# Patient Record
Sex: MALE | Race: WHITE | URBAN - METROPOLITAN AREA
[De-identification: names, ages, dates, MRNs, and addresses within clinical notes are randomized per-mention and may not be internally consistent; named-entity substitution may affect disease eponyms.]

---

## 2018-01-05 ENCOUNTER — DOCTOR'S OFFICE (OUTPATIENT)
Dept: URBAN - METROPOLITAN AREA CLINIC 136 | Facility: CLINIC | Age: 73
Setting detail: OPHTHALMOLOGY
End: 2018-01-05
Payer: COMMERCIAL

## 2018-01-05 DIAGNOSIS — H52.4: ICD-10-CM

## 2018-01-05 DIAGNOSIS — H52.03: ICD-10-CM

## 2018-01-05 DIAGNOSIS — H52.223: ICD-10-CM

## 2018-01-05 PROCEDURE — 92004 COMPRE OPH EXAM NEW PT 1/>: CPT | Performed by: OPTOMETRIST

## 2018-01-05 ASSESSMENT — SPHEQUIV_DERIVED
OS_SPHEQUIV: 3.75
OS_SPHEQUIV: 3.5

## 2018-01-05 ASSESSMENT — REFRACTION_MANIFEST
OS_VA1: 20/
OS_VA1: 20/50-2
OU_VA: 20/
OS_VA1: 20/
OS_VA2: 20/40
OU_VA: 20/
OD_VA1: 20/NI
OS_ADD: +3.25
OD_VA3: 20/
OS_VA2: 20/
OS_CYLINDER: -1.00
OD_VA1: 20/
OS_VA3: 20/
OS_SPHERE: +4.25
OD_VA2: 20/
OD_SPHERE: BALANCE
OU_VA: 20/50-2
OS_VA2: 20/
OD_VA2: 20/NI
OD_VA3: 20/
OD_VA2: 20/
OS_VA3: 20/
OD_ADD: +3.25
OD_VA3: 20/
OS_AXIS: 020
OD_VA1: 20/
OS_VA3: 20/

## 2018-01-05 ASSESSMENT — REFRACTION_CURRENTRX
OD_OVR_VA: 20/
OS_OVR_VA: 20/
OD_OVR_VA: 20/
OD_OVR_VA: 20/
OS_OVR_VA: 20/
OS_OVR_VA: 20/

## 2018-01-05 ASSESSMENT — VISUAL ACUITY
OS_BCVA: 20/CF 1'
OD_BCVA: 20/80-2

## 2018-01-05 ASSESSMENT — REFRACTION_AUTOREFRACTION
OS_SPHERE: +4.25
OS_AXIS: 010
OD_SPHERE: ERROR
OS_CYLINDER: -1.50

## 2018-01-05 ASSESSMENT — CONFRONTATIONAL VISUAL FIELD TEST (CVF): OS_FINDINGS: FULL

## 2018-02-02 ENCOUNTER — DOCTOR'S OFFICE (OUTPATIENT)
Dept: URBAN - METROPOLITAN AREA CLINIC 136 | Facility: CLINIC | Age: 73
Setting detail: OPHTHALMOLOGY
End: 2018-02-02
Payer: MEDICARE

## 2018-02-02 DIAGNOSIS — H25.11: ICD-10-CM

## 2018-02-02 DIAGNOSIS — H25.13: ICD-10-CM

## 2018-02-02 DIAGNOSIS — H53.001: ICD-10-CM

## 2018-02-02 DIAGNOSIS — H35.3122: ICD-10-CM

## 2018-02-02 PROCEDURE — 92134 CPTRZ OPH DX IMG PST SGM RTA: CPT | Performed by: OPHTHALMOLOGY

## 2018-02-02 PROCEDURE — 76512 OPH US DX B-SCAN: CPT | Performed by: OPHTHALMOLOGY

## 2018-02-02 PROCEDURE — 92014 COMPRE OPH EXAM EST PT 1/>: CPT | Performed by: OPHTHALMOLOGY

## 2018-02-02 ASSESSMENT — REFRACTION_MANIFEST
OS_VA3: 20/
OS_SPHERE: +4.25
OS_VA1: 20/
OS_VA2: 20/
OD_VA1: 20/NI
OD_ADD: +3.25
OS_VA3: 20/
OD_VA2: 20/NI
OS_VA2: 20/
OD_VA2: 20/
OD_SPHERE: BALANCE
OS_CYLINDER: -1.00
OD_VA2: 20/
OS_VA1: 20/
OD_VA3: 20/
OD_VA1: 20/
OU_VA: 20/50-2
OS_VA3: 20/
OD_VA1: 20/
OU_VA: 20/
OU_VA: 20/
OD_VA3: 20/
OS_AXIS: 020
OS_VA1: 20/50-2
OS_ADD: +3.25
OS_VA2: 20/40
OD_VA3: 20/

## 2018-02-02 ASSESSMENT — VISUAL ACUITY
OD_BCVA: 20/80-1
OS_BCVA: 20/CF 1'

## 2018-02-02 ASSESSMENT — CONFRONTATIONAL VISUAL FIELD TEST (CVF): OS_FINDINGS: FULL

## 2018-02-02 ASSESSMENT — SPHEQUIV_DERIVED
OS_SPHEQUIV: 3.5
OS_SPHEQUIV: 3.75

## 2018-02-02 ASSESSMENT — REFRACTION_AUTOREFRACTION
OS_CYLINDER: -1.50
OS_AXIS: 010
OD_SPHERE: ERROR
OS_SPHERE: +4.25

## 2018-02-02 ASSESSMENT — REFRACTION_CURRENTRX
OD_OVR_VA: 20/
OD_OVR_VA: 20/
OS_OVR_VA: 20/
OD_OVR_VA: 20/
OS_OVR_VA: 20/
OS_OVR_VA: 20/

## 2018-02-16 ENCOUNTER — DOCTOR'S OFFICE (OUTPATIENT)
Dept: URBAN - METROPOLITAN AREA CLINIC 136 | Facility: CLINIC | Age: 73
Setting detail: OPHTHALMOLOGY
End: 2018-02-16
Payer: MEDICARE

## 2018-02-16 DIAGNOSIS — H25.012: ICD-10-CM

## 2018-02-16 PROCEDURE — 76519 ECHO EXAM OF EYE: CPT | Performed by: OPHTHALMOLOGY

## 2018-03-22 ENCOUNTER — AMBUL SURGICAL CARE (OUTPATIENT)
Dept: URBAN - METROPOLITAN AREA SURGERY 32 | Facility: SURGERY | Age: 73
Setting detail: OPHTHALMOLOGY
End: 2018-03-22
Payer: MEDICARE

## 2018-03-22 DIAGNOSIS — H25.12: ICD-10-CM

## 2018-03-22 DIAGNOSIS — H25.012: ICD-10-CM

## 2018-03-22 DIAGNOSIS — H25.042: ICD-10-CM

## 2018-03-22 PROCEDURE — 66984 XCAPSL CTRC RMVL W/O ECP: CPT | Performed by: OPHTHALMOLOGY

## 2018-03-22 PROCEDURE — G8907 PT DOC NO EVENTS ON DISCHARG: HCPCS | Performed by: OPHTHALMOLOGY

## 2018-03-22 PROCEDURE — G8918 PT W/O PREOP ORDER IV AB PRO: HCPCS | Performed by: OPHTHALMOLOGY

## 2018-03-23 ENCOUNTER — RX ONLY (RX ONLY)
Age: 73
End: 2018-03-23

## 2018-03-23 ENCOUNTER — DOCTOR'S OFFICE (OUTPATIENT)
Dept: URBAN - METROPOLITAN AREA CLINIC 136 | Facility: CLINIC | Age: 73
Setting detail: OPHTHALMOLOGY
End: 2018-03-23
Payer: MEDICARE

## 2018-03-23 DIAGNOSIS — Z96.1: ICD-10-CM

## 2018-03-23 PROCEDURE — 99024 POSTOP FOLLOW-UP VISIT: CPT | Performed by: OPTOMETRIST

## 2018-03-23 ASSESSMENT — REFRACTION_AUTOREFRACTION
OS_SPHERE: +2.00
OS_AXIS: 022
OD_SPHERE: ERROR
OS_CYLINDER: -1.50

## 2018-03-23 ASSESSMENT — REFRACTION_MANIFEST
OS_VA2: 20/
OD_ADD: +3.25
OD_VA2: 20/
OS_AXIS: 020
OD_VA1: 20/
OS_VA2: 20/40
OS_VA1: 20/50-2
OS_ADD: +3.25
OD_VA2: 20/
OS_VA3: 20/
OD_VA1: 20/NI
OS_VA1: 20/
OD_VA3: 20/
OD_SPHERE: BALANCE
OS_VA3: 20/
OD_VA2: 20/NI
OS_VA1: 20/
OD_VA3: 20/
OS_CYLINDER: -1.00
OS_VA3: 20/
OU_VA: 20/50-2
OU_VA: 20/
OU_VA: 20/
OD_VA3: 20/
OS_VA2: 20/
OD_VA1: 20/
OS_SPHERE: +4.25

## 2018-03-23 ASSESSMENT — REFRACTION_CURRENTRX
OD_OVR_VA: 20/
OS_OVR_VA: 20/
OS_OVR_VA: 20/
OD_OVR_VA: 20/
OD_OVR_VA: 20/
OS_OVR_VA: 20/

## 2018-03-23 ASSESSMENT — AXIALLENGTH_DERIVED
OS_AL: 22.8533
OS_AL: 21.9745

## 2018-03-23 ASSESSMENT — VISUAL ACUITY
OD_BCVA: 20/80-1
OS_BCVA: 20/70+1

## 2018-03-23 ASSESSMENT — KERATOMETRY
OD_AXISANGLE_DEGREES: 025
OD_K1POWER_DIOPTERS: 41.25
OS_K1POWER_DIOPTERS: 43.00
OD_K2POWER_DIOPTERS: 44.50
OS_K2POWER_DIOPTERS: 45.50
OS_AXISANGLE_DEGREES: 016

## 2018-03-23 ASSESSMENT — SPHEQUIV_DERIVED
OS_SPHEQUIV: 3.75
OS_SPHEQUIV: 1.25

## 2018-03-26 ENCOUNTER — DOCTOR'S OFFICE (OUTPATIENT)
Dept: URBAN - METROPOLITAN AREA CLINIC 136 | Facility: CLINIC | Age: 73
Setting detail: OPHTHALMOLOGY
End: 2018-03-26
Payer: MEDICARE

## 2018-03-26 DIAGNOSIS — H25.011: ICD-10-CM

## 2018-03-26 PROCEDURE — 92136 OPHTHALMIC BIOMETRY: CPT | Performed by: OPHTHALMOLOGY

## 2018-04-02 ENCOUNTER — DOCTOR'S OFFICE (OUTPATIENT)
Dept: URBAN - METROPOLITAN AREA CLINIC 136 | Facility: CLINIC | Age: 73
Setting detail: OPHTHALMOLOGY
End: 2018-04-02
Payer: MEDICARE

## 2018-04-02 DIAGNOSIS — Z96.1: ICD-10-CM

## 2018-04-02 DIAGNOSIS — H26.40: ICD-10-CM

## 2018-04-02 DIAGNOSIS — H25.11: ICD-10-CM

## 2018-04-02 PROCEDURE — 99024 POSTOP FOLLOW-UP VISIT: CPT | Performed by: OPHTHALMOLOGY

## 2018-04-02 ASSESSMENT — AXIALLENGTH_DERIVED
OS_AL: 22.6348
OS_AL: 21.8556

## 2018-04-02 ASSESSMENT — REFRACTION_MANIFEST
OS_VA1: 20/
OD_VA1: 20/
OD_VA3: 20/
OD_SPHERE: BALANCE
OS_CYLINDER: -1.00
OS_VA2: 20/40
OS_VA3: 20/
OD_VA2: 20/NI
OU_VA: 20/
OS_ADD: +3.25
OD_VA3: 20/
OD_VA1: 20/
OD_ADD: +3.25
OS_VA3: 20/
OS_VA2: 20/
OD_VA3: 20/
OU_VA: 20/
OS_AXIS: 020
OU_VA: 20/50-2
OD_VA2: 20/
OS_SPHERE: +4.25
OS_VA1: 20/
OS_VA2: 20/
OD_VA1: 20/NI
OD_VA2: 20/
OS_VA1: 20/50-2
OS_VA3: 20/

## 2018-04-02 ASSESSMENT — KERATOMETRY
OD_AXISANGLE_DEGREES: 025
OS_K1POWER_DIOPTERS: 43.50
OS_K2POWER_DIOPTERS: 45.75
OD_K2POWER_DIOPTERS: 44.50
OD_K1POWER_DIOPTERS: 41.25
OS_AXISANGLE_DEGREES: 08

## 2018-04-02 ASSESSMENT — REFRACTION_CURRENTRX
OD_OVR_VA: 20/
OS_OVR_VA: 20/
OD_OVR_VA: 20/
OD_OVR_VA: 20/

## 2018-04-02 ASSESSMENT — VISUAL ACUITY
OD_BCVA: 20/50-2
OS_BCVA: 20/70+1

## 2018-04-02 ASSESSMENT — REFRACTION_AUTOREFRACTION
OS_SPHERE: +2.75
OD_SPHERE: ERROR
OS_CYLINDER: -2.50
OS_AXIS: 178

## 2018-04-02 ASSESSMENT — SPHEQUIV_DERIVED
OS_SPHEQUIV: 1.5
OS_SPHEQUIV: 3.75

## 2018-04-26 ENCOUNTER — AMBUL SURGICAL CARE (OUTPATIENT)
Dept: URBAN - METROPOLITAN AREA SURGERY 32 | Facility: SURGERY | Age: 73
Setting detail: OPHTHALMOLOGY
End: 2018-04-26
Payer: MEDICARE

## 2018-04-26 DIAGNOSIS — H25.21: ICD-10-CM

## 2018-04-26 DIAGNOSIS — H25.11: ICD-10-CM

## 2018-04-26 DIAGNOSIS — H25.041: ICD-10-CM

## 2018-04-26 PROCEDURE — G8918 PT W/O PREOP ORDER IV AB PRO: HCPCS | Performed by: OPHTHALMOLOGY

## 2018-04-26 PROCEDURE — G8907 PT DOC NO EVENTS ON DISCHARG: HCPCS | Performed by: OPHTHALMOLOGY

## 2018-04-26 PROCEDURE — 66984 XCAPSL CTRC RMVL W/O ECP: CPT | Performed by: OPHTHALMOLOGY

## 2018-04-27 ENCOUNTER — RX ONLY (RX ONLY)
Age: 73
End: 2018-04-27

## 2018-04-27 ENCOUNTER — DOCTOR'S OFFICE (OUTPATIENT)
Dept: URBAN - METROPOLITAN AREA CLINIC 136 | Facility: CLINIC | Age: 73
Setting detail: OPHTHALMOLOGY
End: 2018-04-27
Payer: MEDICARE

## 2018-04-27 DIAGNOSIS — H26.40: ICD-10-CM

## 2018-04-27 DIAGNOSIS — Z96.1: ICD-10-CM

## 2018-04-27 PROCEDURE — 99024 POSTOP FOLLOW-UP VISIT: CPT | Performed by: OPTOMETRIST

## 2018-04-27 ASSESSMENT — REFRACTION_CURRENTRX
OS_OVR_VA: 20/
OD_OVR_VA: 20/
OS_OVR_VA: 20/
OS_OVR_VA: 20/

## 2018-04-27 ASSESSMENT — REFRACTION_MANIFEST
OS_VA3: 20/
OS_AXIS: 020
OD_VA1: 20/NI
OS_VA3: 20/
OU_VA: 20/
OS_VA2: 20/40
OU_VA: 20/50-2
OS_VA3: 20/
OS_VA1: 20/50-2
OD_VA3: 20/
OS_VA1: 20/
OS_VA1: 20/
OS_VA2: 20/
OS_SPHERE: +4.25
OD_VA2: 20/
OD_VA1: 20/
OD_VA3: 20/
OD_ADD: +3.25
OD_VA2: 20/
OD_VA3: 20/
OD_VA2: 20/NI
OS_VA2: 20/
OD_SPHERE: BALANCE
OU_VA: 20/
OS_CYLINDER: -1.00
OS_ADD: +3.25
OD_VA1: 20/

## 2018-04-27 ASSESSMENT — CORNEAL EDEMA - FOLDS/STRIAE: OD_FOLDSSTRIAE: 2+

## 2018-04-27 ASSESSMENT — VISUAL ACUITY
OS_BCVA: 20/400
OD_BCVA: 20/60

## 2018-04-27 ASSESSMENT — KERATOMETRY
OS_K1POWER_DIOPTERS: 43.50
OD_K2POWER_DIOPTERS: 44.50
OD_K1POWER_DIOPTERS: 41.25
OD_AXISANGLE_DEGREES: 025
OS_AXISANGLE_DEGREES: 08
OS_K2POWER_DIOPTERS: 45.75

## 2018-04-27 ASSESSMENT — SPHEQUIV_DERIVED
OS_SPHEQUIV: 3.75
OS_SPHEQUIV: 1
OD_SPHEQUIV: -0.375

## 2018-04-27 ASSESSMENT — AXIALLENGTH_DERIVED
OS_AL: 21.8556
OD_AL: 23.9737
OS_AL: 22.8155

## 2018-04-27 ASSESSMENT — REFRACTION_AUTOREFRACTION
OS_AXIS: 20
OS_CYLINDER: -1.50
OD_SPHERE: +2.75
OD_AXIS: 170
OS_SPHERE: +1.75
OD_CYLINDER: -6.25

## 2018-04-27 ASSESSMENT — CORNEAL EDEMA - MICROCYSTIC EPITHELIAL EDEMA (MCE): OD_MCE: 1+

## 2018-05-07 ENCOUNTER — DOCTOR'S OFFICE (OUTPATIENT)
Dept: URBAN - METROPOLITAN AREA CLINIC 136 | Facility: CLINIC | Age: 73
Setting detail: OPHTHALMOLOGY
End: 2018-05-07
Payer: MEDICARE

## 2018-05-07 DIAGNOSIS — H26.40: ICD-10-CM

## 2018-05-07 DIAGNOSIS — Z96.1: ICD-10-CM

## 2018-05-07 PROCEDURE — 99024 POSTOP FOLLOW-UP VISIT: CPT | Performed by: OPTOMETRIST

## 2018-05-07 ASSESSMENT — VISUAL ACUITY
OD_BCVA: 20/70+1
OS_BCVA: 20/150

## 2018-05-07 ASSESSMENT — REFRACTION_CURRENTRX
OD_OVR_VA: 20/
OS_OVR_VA: 20/
OD_OVR_VA: 20/
OD_OVR_VA: 20/

## 2018-05-07 ASSESSMENT — REFRACTION_AUTOREFRACTION
OS_SPHERE: +1.50
OS_AXIS: 016
OS_CYLINDER: -1.75
OD_SPHERE: +0.75
OD_AXIS: 180
OD_CYLINDER: -2.00

## 2018-05-07 ASSESSMENT — REFRACTION_MANIFEST
OD_VA3: 20/
OD_VA1: 20/
OD_VA2: 20/
OD_VA3: 20/
OU_VA: 20/
OS_VA2: 20/40
OS_VA3: 20/
OS_ADD: +3.25
OD_VA2: 20/NI
OS_VA3: 20/
OU_VA: 20/
OD_VA3: 20/
OU_VA: 20/50-2
OS_VA2: 20/
OS_AXIS: 020
OS_VA1: 20/
OD_VA2: 20/
OD_VA1: 20/
OD_VA1: 20/NI
OD_SPHERE: BALANCE
OS_SPHERE: +4.25
OS_VA1: 20/
OS_VA2: 20/
OD_ADD: +3.25
OS_VA3: 20/
OS_VA1: 20/50-2
OS_CYLINDER: -1.00

## 2018-05-07 ASSESSMENT — AXIALLENGTH_DERIVED
OD_AL: 23.9235
OS_AL: 22.953
OS_AL: 21.8556

## 2018-05-07 ASSESSMENT — KERATOMETRY
OS_K1POWER_DIOPTERS: 43.50
OD_AXISANGLE_DEGREES: 025
OS_K2POWER_DIOPTERS: 45.75
OS_AXISANGLE_DEGREES: 08
OD_K1POWER_DIOPTERS: 41.25
OD_K2POWER_DIOPTERS: 44.50

## 2018-05-07 ASSESSMENT — CORNEAL EDEMA - MICROCYSTIC EPITHELIAL EDEMA (MCE): OD_MCE: T

## 2018-05-07 ASSESSMENT — SPHEQUIV_DERIVED
OS_SPHEQUIV: 0.625
OS_SPHEQUIV: 3.75
OD_SPHEQUIV: -0.25

## 2018-05-07 ASSESSMENT — CORNEAL EDEMA CLINICAL DESCRIPTION: OD_CORNEALEDEMA: AT INCISION SITE, CLEAR CENTRALLY

## 2018-05-07 ASSESSMENT — CONFRONTATIONAL VISUAL FIELD TEST (CVF): OS_FINDINGS: FULL

## 2018-05-07 ASSESSMENT — CORNEAL EDEMA - FOLDS/STRIAE: OD_FOLDSSTRIAE: T

## 2018-06-13 ENCOUNTER — RX ONLY (RX ONLY)
Age: 73
End: 2018-06-13

## 2018-06-13 ENCOUNTER — DOCTOR'S OFFICE (OUTPATIENT)
Dept: URBAN - METROPOLITAN AREA CLINIC 136 | Facility: CLINIC | Age: 73
Setting detail: OPHTHALMOLOGY
End: 2018-06-13
Payer: MEDICARE

## 2018-06-13 DIAGNOSIS — Z96.1: ICD-10-CM

## 2018-06-13 DIAGNOSIS — H26.40: ICD-10-CM

## 2018-06-13 PROCEDURE — 99024 POSTOP FOLLOW-UP VISIT: CPT | Performed by: OPHTHALMOLOGY

## 2018-06-13 ASSESSMENT — AXIALLENGTH_DERIVED
OS_AL: 22.8611
OD_AL: 23.98
OS_AL: 21.8556

## 2018-06-13 ASSESSMENT — REFRACTION_MANIFEST
OS_VA1: 20/
OS_SPHERE: +4.25
OD_VA3: 20/
OS_VA1: 20/50-2
OU_VA: 20/50-2
OS_VA2: 20/
OD_VA2: 20/
OS_VA3: 20/
OD_VA2: 20/NI
OD_VA1: 20/NI
OS_AXIS: 020
OS_ADD: +3.25
OU_VA: 20/
OS_CYLINDER: -1.00
OS_VA2: 20/40
OD_SPHERE: BALANCE
OS_VA3: 20/
OS_VA3: 20/
OD_ADD: +3.25
OS_VA2: 20/
OU_VA: 20/
OD_VA1: 20/
OD_VA1: 20/
OD_VA2: 20/
OS_VA1: 20/

## 2018-06-13 ASSESSMENT — REFRACTION_AUTOREFRACTION
OD_AXIS: 180
OD_SPHERE: +0.50
OD_CYLINDER: -1.78
OS_AXIS: 017
OS_CYLINDER: -1.75
OS_SPHERE: +1.75

## 2018-06-13 ASSESSMENT — KERATOMETRY
OS_K2POWER_DIOPTERS: 45.75
OD_AXISANGLE_DEGREES: 025
OS_K1POWER_DIOPTERS: 43.50
OD_K2POWER_DIOPTERS: 44.50
OD_K1POWER_DIOPTERS: 41.25
OS_AXISANGLE_DEGREES: 08

## 2018-06-13 ASSESSMENT — SPHEQUIV_DERIVED
OS_SPHEQUIV: 0.875
OD_SPHEQUIV: -0.39
OS_SPHEQUIV: 3.75

## 2018-06-13 ASSESSMENT — REFRACTION_CURRENTRX
OS_OVR_VA: 20/
OS_OVR_VA: 20/
OD_OVR_VA: 20/
OD_OVR_VA: 20/
OS_OVR_VA: 20/
OD_OVR_VA: 20/

## 2018-06-13 ASSESSMENT — VISUAL ACUITY
OD_BCVA: 20/60-1
OS_BCVA: 20/60-1

## 2018-06-13 ASSESSMENT — CORNEAL EDEMA - MICROCYSTIC EPITHELIAL EDEMA (MCE): OD_MCE: T

## 2018-06-13 ASSESSMENT — CORNEAL EDEMA CLINICAL DESCRIPTION: OD_CORNEALEDEMA: AT INCISION SITE, CLEAR CENTRALLY

## 2018-06-13 ASSESSMENT — CORNEAL EDEMA - FOLDS/STRIAE: OD_FOLDSSTRIAE: T

## 2018-06-27 ENCOUNTER — OPTICAL OFFICE (OUTPATIENT)
Dept: URBAN - METROPOLITAN AREA CLINIC 143 | Facility: CLINIC | Age: 73
Setting detail: OPHTHALMOLOGY
End: 2018-06-27
Payer: COMMERCIAL

## 2018-06-27 ENCOUNTER — DOCTOR'S OFFICE (OUTPATIENT)
Dept: URBAN - METROPOLITAN AREA CLINIC 136 | Facility: CLINIC | Age: 73
Setting detail: OPHTHALMOLOGY
End: 2018-06-27
Payer: MEDICARE

## 2018-06-27 DIAGNOSIS — H52.223: ICD-10-CM

## 2018-06-27 DIAGNOSIS — H52.03: ICD-10-CM

## 2018-06-27 DIAGNOSIS — H52.4: ICD-10-CM

## 2018-06-27 PROCEDURE — V2203 LENS SPHCYL BIFOCAL 4.00D/.1: HCPCS | Performed by: OPTOMETRIST

## 2018-06-27 PROCEDURE — V2020 VISION SVCS FRAMES PURCHASES: HCPCS | Performed by: OPTOMETRIST

## 2018-06-27 PROCEDURE — 92015 DETERMINE REFRACTIVE STATE: CPT | Performed by: OPTOMETRIST

## 2018-06-27 ASSESSMENT — REFRACTION_MANIFEST
OS_VA2: 20/
OS_VA1: 20/
OS_VA3: 20/
OD_VA3: 20/
OD_VA1: 20/
OD_VA1: 20/
OD_VA3: 20/
OD_VA2: 20/
OU_VA: 20/
OU_VA: 20/
OD_VA2: 20/
OS_VA3: 20/
OS_VA2: 20/
OS_VA1: 20/

## 2018-06-27 ASSESSMENT — REFRACTION_AUTOREFRACTION
OS_CYLINDER: -2.00
OS_SPHERE: +2.00
OD_SPHERE: +0.75
OD_AXIS: 001
OD_CYLINDER: -2.00
OS_AXIS: 011

## 2018-06-27 ASSESSMENT — REFRACTION_OUTSIDERX
OS_CYLINDER: -1.75
OD_VA2: 20/25
OD_ADD: +3.00
OS_VA1: 20/50
OS_ADD: +3.00
OD_AXIS: 180
OS_AXIS: 010
OU_VA: 20/40
OS_VA3: 20/
OD_VA3: 20/
OS_SPHERE: +2.00
OD_CYLINDER: -2.00
OD_SPHERE: +0.75
OS_VA2: 20/25
OD_VA1: 20/40

## 2018-06-27 ASSESSMENT — CONFRONTATIONAL VISUAL FIELD TEST (CVF)
OD_FINDINGS: FULL
OS_FINDINGS: FULL

## 2018-06-27 ASSESSMENT — KERATOMETRY
OS_K1POWER_DIOPTERS: 43.50
OS_K2POWER_DIOPTERS: 45.75
OS_AXISANGLE_DEGREES: 08
OD_K2POWER_DIOPTERS: 44.50
OD_AXISANGLE_DEGREES: 025
OD_K1POWER_DIOPTERS: 41.25

## 2018-06-27 ASSESSMENT — SPHEQUIV_DERIVED
OD_SPHEQUIV: -0.25
OS_SPHEQUIV: 1

## 2018-06-27 ASSESSMENT — REFRACTION_CURRENTRX
OD_OVR_VA: 20/
OD_OVR_VA: 20/
OS_OVR_VA: 20/
OS_OVR_VA: 20/
OD_OVR_VA: 20/
OS_OVR_VA: 20/

## 2018-06-27 ASSESSMENT — VISUAL ACUITY
OD_BCVA: 20/60
OS_BCVA: 20/50-2

## 2018-06-27 ASSESSMENT — AXIALLENGTH_DERIVED
OD_AL: 23.9235
OS_AL: 22.8155

## 2018-07-31 ENCOUNTER — TELEPHONE (OUTPATIENT)
Dept: FAMILY MEDICINE CLINIC | Facility: CLINIC | Age: 73
End: 2018-07-31

## 2018-07-31 DIAGNOSIS — I10 ESSENTIAL HYPERTENSION: Primary | ICD-10-CM

## 2018-08-01 PROBLEM — M85.80 OSTEOPENIA: Status: ACTIVE | Noted: 2018-03-01

## 2018-08-01 PROBLEM — H25.10 NUCLEAR SENILE CATARACT: Status: ACTIVE | Noted: 2018-03-01

## 2018-08-01 PROBLEM — H91.10 PRESBYCUSIS: Status: ACTIVE | Noted: 2018-02-15

## 2018-08-01 PROBLEM — H26.9 CATARACT OF RIGHT EYE: Status: ACTIVE | Noted: 2018-02-15

## 2018-08-01 PROBLEM — F17.200 NICOTINE DEPENDENCE: Status: ACTIVE | Noted: 2018-02-15

## 2018-08-01 RX ORDER — AMLODIPINE BESYLATE 5 MG/1
5 TABLET ORAL EVERY 24 HOURS
Qty: 30 TABLET | Refills: 3 | Status: SHIPPED | OUTPATIENT
Start: 2018-08-01 | End: 2018-09-11 | Stop reason: SDUPTHER

## 2018-08-01 RX ORDER — ALBUTEROL SULFATE 90 UG/1
2 AEROSOL, METERED RESPIRATORY (INHALATION) EVERY 6 HOURS PRN
COMMUNITY
Start: 2018-02-15 | End: 2018-09-11

## 2018-08-01 RX ORDER — PREDNISOLONE ACETATE 10 MG/ML
SUSPENSION/ DROPS OPHTHALMIC
Refills: 0 | COMMUNITY
Start: 2018-05-09 | End: 2018-09-11

## 2018-08-01 RX ORDER — LISINOPRIL 20 MG/1
TABLET ORAL EVERY 24 HOURS
COMMUNITY
Start: 2018-06-04 | End: 2018-08-01 | Stop reason: SDUPTHER

## 2018-08-01 RX ORDER — OFLOXACIN 3 MG/ML
SOLUTION/ DROPS OPHTHALMIC
Refills: 0 | COMMUNITY
Start: 2018-05-09 | End: 2018-09-11

## 2018-08-01 RX ORDER — LISINOPRIL 20 MG/1
20 TABLET ORAL EVERY 24 HOURS
Qty: 30 TABLET | Refills: 3 | Status: SHIPPED | OUTPATIENT
Start: 2018-08-01 | End: 2018-09-11 | Stop reason: SDUPTHER

## 2018-08-01 RX ORDER — AMLODIPINE BESYLATE 5 MG/1
TABLET ORAL EVERY 24 HOURS
COMMUNITY
Start: 2018-06-04 | End: 2018-08-01 | Stop reason: SDUPTHER

## 2018-09-11 ENCOUNTER — OFFICE VISIT (OUTPATIENT)
Dept: FAMILY MEDICINE CLINIC | Facility: CLINIC | Age: 73
End: 2018-09-11
Payer: COMMERCIAL

## 2018-09-11 VITALS
BODY MASS INDEX: 25.18 KG/M2 | SYSTOLIC BLOOD PRESSURE: 130 MMHG | DIASTOLIC BLOOD PRESSURE: 50 MMHG | WEIGHT: 170 LBS | HEIGHT: 69 IN | HEART RATE: 96 BPM | TEMPERATURE: 98 F | RESPIRATION RATE: 20 BRPM | OXYGEN SATURATION: 94 %

## 2018-09-11 DIAGNOSIS — F17.219 CIGARETTE NICOTINE DEPENDENCE WITH NICOTINE-INDUCED DISORDER: ICD-10-CM

## 2018-09-11 DIAGNOSIS — Z00.00 HEALTHCARE MAINTENANCE: ICD-10-CM

## 2018-09-11 DIAGNOSIS — I10 ESSENTIAL HYPERTENSION: ICD-10-CM

## 2018-09-11 DIAGNOSIS — Z23 NEED FOR PNEUMOCOCCAL VACCINATION: ICD-10-CM

## 2018-09-11 DIAGNOSIS — Z09 FOLLOW UP: Primary | ICD-10-CM

## 2018-09-11 DIAGNOSIS — J45.990 EXERCISE-INDUCED ASTHMA: ICD-10-CM

## 2018-09-11 DIAGNOSIS — Z13.6 ENCOUNTER FOR ABDOMINAL AORTIC ANEURYSM (AAA) SCREENING: ICD-10-CM

## 2018-09-11 DIAGNOSIS — Z12.11 ENCOUNTER FOR SCREENING COLONOSCOPY: ICD-10-CM

## 2018-09-11 PROBLEM — H26.9 CATARACT OF RIGHT EYE: Status: RESOLVED | Noted: 2018-02-15 | Resolved: 2018-09-11

## 2018-09-11 PROBLEM — H25.10 NUCLEAR SENILE CATARACT: Status: RESOLVED | Noted: 2018-03-01 | Resolved: 2018-09-11

## 2018-09-11 PROCEDURE — 3078F DIAST BP <80 MM HG: CPT | Performed by: FAMILY MEDICINE

## 2018-09-11 PROCEDURE — 90670 PCV13 VACCINE IM: CPT | Performed by: FAMILY MEDICINE

## 2018-09-11 PROCEDURE — 4040F PNEUMOC VAC/ADMIN/RCVD: CPT | Performed by: FAMILY MEDICINE

## 2018-09-11 PROCEDURE — 3008F BODY MASS INDEX DOCD: CPT | Performed by: FAMILY MEDICINE

## 2018-09-11 PROCEDURE — 90662 IIV NO PRSV INCREASED AG IM: CPT | Performed by: FAMILY MEDICINE

## 2018-09-11 PROCEDURE — 1160F RVW MEDS BY RX/DR IN RCRD: CPT | Performed by: FAMILY MEDICINE

## 2018-09-11 PROCEDURE — 3075F SYST BP GE 130 - 139MM HG: CPT | Performed by: FAMILY MEDICINE

## 2018-09-11 PROCEDURE — G0008 ADMIN INFLUENZA VIRUS VAC: HCPCS | Performed by: FAMILY MEDICINE

## 2018-09-11 PROCEDURE — G0009 ADMIN PNEUMOCOCCAL VACCINE: HCPCS | Performed by: FAMILY MEDICINE

## 2018-09-11 PROCEDURE — 3725F SCREEN DEPRESSION PERFORMED: CPT | Performed by: FAMILY MEDICINE

## 2018-09-11 PROCEDURE — 1101F PT FALLS ASSESS-DOCD LE1/YR: CPT | Performed by: FAMILY MEDICINE

## 2018-09-11 PROCEDURE — 99214 OFFICE O/P EST MOD 30 MIN: CPT | Performed by: FAMILY MEDICINE

## 2018-09-11 RX ORDER — LISINOPRIL 20 MG/1
20 TABLET ORAL EVERY 24 HOURS
Qty: 90 TABLET | Refills: 0 | Status: SHIPPED | OUTPATIENT
Start: 2018-09-11 | End: 2018-11-16 | Stop reason: SDUPTHER

## 2018-09-11 RX ORDER — ALBUTEROL SULFATE 90 UG/1
2 AEROSOL, METERED RESPIRATORY (INHALATION) EVERY 6 HOURS PRN
Qty: 1 INHALER | Refills: 0 | Status: SHIPPED | OUTPATIENT
Start: 2018-09-11 | End: 2019-04-29 | Stop reason: SDUPTHER

## 2018-09-11 RX ORDER — AMLODIPINE BESYLATE 5 MG/1
5 TABLET ORAL EVERY 24 HOURS
Qty: 90 TABLET | Refills: 0 | Status: SHIPPED | OUTPATIENT
Start: 2018-09-11 | End: 2019-01-02 | Stop reason: SDUPTHER

## 2018-09-11 NOTE — PROGRESS NOTES
Patient ID: Vinay Cat  is a 71 y o  male  Chief Complaint: Follow-up    ASSESSMENT/PLAN    Notes for this visit:    Essential hypertension  Controlled, 130/50  Patient's goal BP is <150/90  Refilled medications for 90 day supply  Labs ordered to R/O comorbid conditions  Exercise-induced asthma  Counseled on proper use  Patient understands and agrees  He has an albuterol inhaler at home that he will resume using  Sent a refill to pharmacy also  Will reevaluate him in 6 weeks to see how his symptoms are doing  Patient aware of reasons to go to ER  Nicotine dependence  Patient agreed to trial of cessation therapy, and says he will try nicotine patch  Reports that it has not worked for his friends but he will try it anyway  Alysia Butler was seen today for follow-up  Diagnoses and all orders for this visit:    Follow up    Essential hypertension  -     amLODIPine (NORVASC) 5 mg tablet; Take 1 tablet (5 mg total) by mouth every 24 hours  -     lisinopril (ZESTRIL) 20 mg tablet; Take 1 tablet (20 mg total) by mouth every 24 hours  -     Hemoglobin A1C; Future  -     Comprehensive metabolic panel; Future  -     Lipid panel; Future  -     TSH, 3rd generation with Free T4 reflex; Future  -     UA w Reflex to Microscopic w Reflex to Culture; Future    Cigarette nicotine dependence with nicotine-induced disorder  -     nicotine (NICODERM CQ) 7 mg/24hr TD 24 hr patch; Place 1 patch on the skin every 24 hours    Encounter for abdominal aortic aneurysm (AAA) screening  -     US abdominal aorta screening aaa; Future    Healthcare maintenance  -     influenza vaccine, [id number], high-dose, PF 0 5 mL, for patients 65 yr+ (FLUZONE HIGH-DOSE)    Encounter for screening colonoscopy  -     Ambulatory referral to Gastroenterology;  Future    Exercise-induced asthma  -     CBC and differential; Future    Need for pneumococcal vaccination  -     PNEUMOCOCCAL CONJUGATE VACCINE 13-VALENT GREATER THAN 6 MONTHS      SUBJECTIVE    Patient presents for follow-up  While reviewing medication he reports not using his asthma medication in 5 to 6 months due to the price  Appears mildly out of breath but says it is because he walked here and the weather may have contributed to it  Has been told in the past to use albuterol inhaler prior to walking  He denies SOB or chest pain presently, and denies any recent events of SOB or chest pain  Says he takes 5 flights of stairs daily to his apartment and never has SOB or chest pain  Reports blood pressure medication compliance, as it costs him a dollar or two for both medications  Asked if he can get 90 day refills as his insurance asked him to do this  Smokes 1 pack per month  Retired  Admits to some difficulty sleeping, but attributes it to working night shift for 31 years  Offered sleep evaluation but says he is managing it okay, and not interested in seeing a sleep specialist at this time  No other new complaints  The following portions of the patient's history were reviewed and updated as appropriate: allergies, current medications, past medical history, past social history and problem list     Review of Systems   Constitutional: Negative for chills and fever  HENT: Positive for hearing loss  Respiratory: Negative for cough and shortness of breath  Cardiovascular: Negative for chest pain and palpitations  OBJECTIVE    Vitals: Blood pressure 130/50, pulse 96, temperature 98 °F (36 7 °C), temperature source Tympanic, resp  rate 20, height 5' 9" (1 753 m), weight 77 1 kg (170 lb), SpO2 94 %  Physical Exam   Constitutional: He is oriented to person, place, and time  He appears well-developed and well-nourished  No distress  HENT:   Head: Normocephalic and atraumatic  Cardiovascular: Normal rate, regular rhythm, normal heart sounds and intact distal pulses  Exam reveals no gallop and no friction rub  No murmur heard    Pulmonary/Chest: Effort normal and breath sounds normal  No respiratory distress  He has no wheezes  He exhibits no tenderness  Neurological: He is alert and oriented to person, place, and time  Nursing note and vitals reviewed

## 2018-09-11 NOTE — ASSESSMENT & PLAN NOTE
Patient agreed to trial of cessation therapy, and says he will try nicotine patch  Reports that it has not worked for his friends but he will try it anyway

## 2018-09-11 NOTE — PATIENT INSTRUCTIONS
Asthma   AMBULATORY CARE:   Asthma  is a lung disease that makes breathing difficult  Chronic inflammation and reactions to triggers narrow the airways in your lungs  Asthma can become life-threatening if it is not managed  Cough-variant asthma  is a type of asthma that causes a dry cough that keeps coming back  A dry cough may be your only symptom, or you may also have chest tightness  These symptoms may be caused by exercise or exposure to odors, allergens, or respiratory tract infections  Cough-variant asthma is treated the same way as typical asthma  Common symptoms include the following:   · Coughing     · Wheezing     · Shortness of breath     · Chest tightness  Seek care immediately if:   · You have severe shortness of breath  · Your lips or nails turn blue or gray  · The skin around your neck and ribs pulls in with each breath  · You have shortness of breath, even after you take your short-term medicine as directed  · Your peak flow numbers are in the red zone of your AAP  Contact your healthcare provider if:   · You run out of medicine before your next refill is due  · Your symptoms get worse  · You need to take more medicine than usual to control your symptoms  · You have questions or concerns about your condition or care  Treatment for asthma  will depend on how severe your asthma is  Medicine may decrease inflammation, open airways, and make it easier to breathe  Medicines may be inhaled, taken as a pill, or injected  Short-term medicines relieve your symptoms quickly  Long-term medicines are used to prevent future attacks  You may also need medicine to help control your allergies  Manage and prevent future asthma attacks:   · Follow your asthma action plan  This is a written plan that you and your healthcare provider create  It explains which medicine you need and when to change doses if necessary   It also explains how you can monitor symptoms and use a peak flow meter  The meter measures how well your lungs are working  · Manage other health conditions , such as allergies, acid reflux, and sleep apnea  · Identify and avoid triggers  These may include pets, dust mites, mold, and cockroaches  · Do not smoke or be around others who smoke  Nicotine and other chemicals in cigarettes and cigars can cause lung damage  Ask your healthcare provider for information if you currently smoke and need help to quit  E-cigarettes or smokeless tobacco still contain nicotine  Talk to your healthcare provider before you use these products  · Ask about the flu vaccine  The flu can make your asthma worse  You may need a yearly flu shot  Follow up with your healthcare provider as directed: You will need to return to make sure your medicine is working and your symptoms are controlled  You may be referred to an asthma or allergy specialist  Elizabeth Turner may be asked to keep a record of your peak flow values and bring it with you to your appointments  Write down your questions so you remember to ask them during your visits  © 2017 [address] Information is for End User's use only and may not be sold, redistributed or otherwise used for commercial purposes  All illustrations and images included in CareNotes® are the copyrighted property of [employer]  or [employer]  The above information is an  only  It is not intended as medical advice for individual conditions or treatments  Talk to your doctor, nurse or pharmacist before following any medical regimen to see if it is safe and effective for you

## 2018-09-11 NOTE — ASSESSMENT & PLAN NOTE
Controlled, 130/50  Patient's goal BP is <150/90  Refilled medications for 90 day supply  Labs ordered to R/O comorbid conditions

## 2018-09-11 NOTE — ASSESSMENT & PLAN NOTE
Counseled on proper use  Patient understands and agrees  He has an albuterol inhaler at home that he will resume using  Sent a refill to pharmacy also  Will reevaluate him in 6 weeks to see how his symptoms are doing  Patient aware of reasons to go to ER

## 2018-11-16 DIAGNOSIS — I10 ESSENTIAL HYPERTENSION: ICD-10-CM

## 2018-11-16 NOTE — TELEPHONE ENCOUNTER
Pt was last seen on 9/11/18  Pt was to f/u in 6 weeks but did not schedule appt  I contacted pt and reminded him of f/u appt and asked if he wanted to schedule appt now  Pt agreed  Xferred pt to Benjamin and she scheduled pt on 2/12/18

## 2018-11-19 RX ORDER — LISINOPRIL 20 MG/1
20 TABLET ORAL EVERY 24 HOURS
Qty: 90 TABLET | Refills: 0 | Status: SHIPPED | OUTPATIENT
Start: 2018-11-19 | End: 2019-01-02 | Stop reason: SDUPTHER

## 2019-01-02 DIAGNOSIS — I10 ESSENTIAL HYPERTENSION: ICD-10-CM

## 2019-01-02 RX ORDER — AMLODIPINE BESYLATE 5 MG/1
5 TABLET ORAL EVERY 24 HOURS
Qty: 90 TABLET | Refills: 0 | Status: SHIPPED | OUTPATIENT
Start: 2019-01-02 | End: 2019-01-08 | Stop reason: SDUPTHER

## 2019-01-02 RX ORDER — LISINOPRIL 20 MG/1
20 TABLET ORAL EVERY 24 HOURS
Qty: 90 TABLET | Refills: 0 | Status: SHIPPED | OUTPATIENT
Start: 2019-01-02 | End: 2019-02-12 | Stop reason: SDUPTHER

## 2019-01-04 ENCOUNTER — DOCTOR'S OFFICE (OUTPATIENT)
Dept: URBAN - METROPOLITAN AREA CLINIC 136 | Facility: CLINIC | Age: 74
Setting detail: OPHTHALMOLOGY
End: 2019-01-04
Payer: COMMERCIAL

## 2019-01-04 DIAGNOSIS — H35.3131: ICD-10-CM

## 2019-01-04 DIAGNOSIS — D21.0: ICD-10-CM

## 2019-01-04 DIAGNOSIS — H53.031: ICD-10-CM

## 2019-01-04 DIAGNOSIS — Z96.1: ICD-10-CM

## 2019-01-04 DIAGNOSIS — H26.40: ICD-10-CM

## 2019-01-04 PROCEDURE — 92014 COMPRE OPH EXAM EST PT 1/>: CPT | Performed by: OPTOMETRIST

## 2019-01-04 ASSESSMENT — SPHEQUIV_DERIVED
OD_SPHEQUIV: 0.75
OS_SPHEQUIV: 1.125
OD_SPHEQUIV: -0.25
OS_SPHEQUIV: 1.5

## 2019-01-04 ASSESSMENT — REFRACTION_CURRENTRX
OS_OVR_VA: 20/
OD_OVR_VA: 20/
OD_OVR_VA: 20/
OS_OVR_VA: 20/
OS_OVR_VA: 20/
OD_OVR_VA: 20/

## 2019-01-04 ASSESSMENT — REFRACTION_MANIFEST
OD_AXIS: 180
OD_VA1: 20/
OD_VA1: 20/40
OS_VA2: 20/25
OU_VA: 20/40
OS_VA3: 20/
OD_SPHERE: +0.75
OS_VA3: 20/
OS_SPHERE: +2.00
OD_VA3: 20/
OU_VA: 20/
OD_VA2: 20/
OD_VA3: 20/
OS_CYLINDER: -1.75
OS_VA1: 20/50
OS_ADD: +3.00
OS_VA2: 20/
OD_VA2: 20/25
OS_VA1: 20/
OS_AXIS: 010
OD_CYLINDER: -2.00
OD_ADD: +3.00

## 2019-01-04 ASSESSMENT — KERATOMETRY
OD_AXISANGLE_DEGREES: 025
OS_K1POWER_DIOPTERS: 43.50
OS_K2POWER_DIOPTERS: 45.75
OS_AXISANGLE_DEGREES: 08
OD_K2POWER_DIOPTERS: 44.50
OD_K1POWER_DIOPTERS: 41.25

## 2019-01-04 ASSESSMENT — AXIALLENGTH_DERIVED
OS_AL: 22.77
OD_AL: 23.9235
OS_AL: 22.6348
OD_AL: 23.5294

## 2019-01-04 ASSESSMENT — REFRACTION_AUTOREFRACTION
OD_AXIS: 010
OS_CYLINDER: -2.00
OS_AXIS: 017
OD_SPHERE: +1.75
OD_CYLINDER: -2.00
OS_SPHERE: +2.50

## 2019-01-04 ASSESSMENT — CONFRONTATIONAL VISUAL FIELD TEST (CVF)
OS_FINDINGS: FULL
OD_FINDINGS: FULL

## 2019-01-04 ASSESSMENT — VISUAL ACUITY
OS_BCVA: 20/80
OD_BCVA: 20/70-1

## 2019-01-08 DIAGNOSIS — I10 ESSENTIAL HYPERTENSION: ICD-10-CM

## 2019-01-08 RX ORDER — AMLODIPINE BESYLATE 5 MG/1
TABLET ORAL
Qty: 90 TABLET | Refills: 3 | Status: SHIPPED | OUTPATIENT
Start: 2019-01-08 | End: 2019-02-12 | Stop reason: SDUPTHER

## 2019-02-12 ENCOUNTER — OFFICE VISIT (OUTPATIENT)
Dept: FAMILY MEDICINE CLINIC | Facility: CLINIC | Age: 74
End: 2019-02-12

## 2019-02-12 VITALS
HEART RATE: 132 BPM | TEMPERATURE: 97 F | SYSTOLIC BLOOD PRESSURE: 132 MMHG | BODY MASS INDEX: 24.96 KG/M2 | WEIGHT: 169 LBS | RESPIRATION RATE: 16 BRPM | DIASTOLIC BLOOD PRESSURE: 60 MMHG | OXYGEN SATURATION: 97 %

## 2019-02-12 DIAGNOSIS — R00.0 TACHYCARDIA: Primary | ICD-10-CM

## 2019-02-12 DIAGNOSIS — I10 ESSENTIAL HYPERTENSION: ICD-10-CM

## 2019-02-12 DIAGNOSIS — J45.990 EXERCISE-INDUCED ASTHMA: ICD-10-CM

## 2019-02-12 PROBLEM — F17.200 NICOTINE DEPENDENCE: Status: RESOLVED | Noted: 2018-02-15 | Resolved: 2019-02-12

## 2019-02-12 PROCEDURE — 99214 OFFICE O/P EST MOD 30 MIN: CPT | Performed by: INTERNAL MEDICINE

## 2019-02-12 PROCEDURE — 93000 ELECTROCARDIOGRAM COMPLETE: CPT | Performed by: INTERNAL MEDICINE

## 2019-02-12 PROCEDURE — 3075F SYST BP GE 130 - 139MM HG: CPT | Performed by: INTERNAL MEDICINE

## 2019-02-12 PROCEDURE — 3078F DIAST BP <80 MM HG: CPT | Performed by: INTERNAL MEDICINE

## 2019-02-12 RX ORDER — AMLODIPINE BESYLATE 5 MG/1
5 TABLET ORAL EVERY MORNING
Qty: 90 TABLET | Refills: 3 | Status: SHIPPED | OUTPATIENT
Start: 2019-02-12 | End: 2019-03-19 | Stop reason: SDUPTHER

## 2019-02-12 RX ORDER — LISINOPRIL 20 MG/1
20 TABLET ORAL EVERY EVENING
Qty: 90 TABLET | Refills: 3 | Status: SHIPPED | OUTPATIENT
Start: 2019-02-12 | End: 2019-03-19 | Stop reason: SDUPTHER

## 2019-02-12 NOTE — PROGRESS NOTES
Patient ID: Andre Aparicio  is a 68 y o  male  Chief Complaint: Follow-up    Notes for this visit:    Exercise-induced asthma  Requires albuterol when climbing stairs or walking 6 blocks, usually 1-2x/week  Was referred for PFT 3/2018 but did not schedule  Recommend PFT as two times patient has been seen in the office and appeared mildly short of breath  Both times he walked here and used his albuterol  Also has history of smoking  Will also order CXR  Consider echocardiogram in future  Essential hypertension  -BP is 132/60  -In the general population over 60, a goal BP <150/<90 is recommended  (JNC8, Grade A)  -Controlled  In the general population over 60 years, pharmacologic treatment for high BP resulting in lower achieved SBP (eg, <140 mm Hg) and if treatment is well tolerated and without adverse effects on health or quality of life, treatment does not need to be adjusted  (JNC 8, Grade E)    -12 lead ECG will be ordered to evaluate for LVH or old infarction   -Fasting metabolic panel with eGFR to evaluate for renal insufficiency, hyperglycemia, hypokalemia, hyperuricemia, or hypercalcemia    -Fasting lipid panel to evaluate for high LDL, low HDL, or high triglycerides  -Urinalysis to evaluate for proteinuria  -Hemoglobin level to evaluate for anemia or polycythemia, which may suggest secondary cause of complication  -TSH to evaluate for thyroid dysfunction    Tachycardia  Likely 2/2 using albuterol prior to coming in office  Rate slowly dropping  ECG done as patient also has history of controlled hypertension  Rate 108, normal axis and intervals, double humped P wave in III, non-specific T wave abnormality in V5-6  Advised patient to schedule colonoscopy as he has never had one, and AAA screening  Sent message to clinical staff to help coordinate  RTO in about 5 weeks  Plans to go to [place] with friends to watch [employer] racing this summer      Aixa Loge was seen today for follow-up and medication refill  Diagnoses and all orders for this visit:    Tachycardia    Essential hypertension  -     amLODIPine (NORVASC) 5 mg tablet; Take 1 tablet (5 mg total) by mouth every morning  -     lisinopril (ZESTRIL) 20 mg tablet; Take 1 tablet (20 mg total) by mouth every evening  -     POCT ECG    Exercise-induced asthma  -     Spirometry with post bronchodilator; Future  -     XR chest pa & lateral; Future    History:    HPI: Patient offers no new medical complaints  Reports improvement in sleep  Forgot to go for blood work  Has not yet scheduled colonoscopy or AAA screening  Wakes up in the middle of the night two to three times to urinate  Drinks few glasses of water before sleep  Advised to drink water earlier in the evening before trying any further interventions  He understands and agrees to try this  The following portions of the patient's history were reviewed and updated as appropriate: allergies, current medications, past medical history, past social history and problem list     Review of Systems   Constitutional: Negative for chills, fever and unexpected weight change  HENT: Negative  Eyes: Negative  Respiratory: Negative for cough, chest tightness, shortness of breath and wheezing  Cardiovascular: Negative for chest pain, palpitations and leg swelling  Gastrointestinal: Negative for abdominal distention, abdominal pain, blood in stool (does not always look, asked patient to look everytime and let us know, never blood on tissue paper when wiping), constipation, diarrhea, nausea and vomiting  Endocrine: Negative  Genitourinary: Negative for difficulty urinating, dysuria and hematuria  Musculoskeletal: Negative  Skin: Negative  Allergic/Immunologic: Negative  Neurological: Negative  Hematological: Negative  Psychiatric/Behavioral: Negative          Patient Active Problem List   Diagnosis    Presbycusis    Osteopenia    Essential hypertension    Exercise-induced asthma    Tachycardia     Current Outpatient Medications:     albuterol (VENTOLIN HFA) 90 mcg/act inhaler, Inhale 2 puffs every 6 (six) hours as needed for shortness of breath 15 to 30 minutes before walking, Disp: 1 Inhaler, Rfl: 0    amLODIPine (NORVASC) 5 mg tablet, Take 1 tablet (5 mg total) by mouth every morning, Disp: 90 tablet, Rfl: 3    lisinopril (ZESTRIL) 20 mg tablet, Take 1 tablet (20 mg total) by mouth every evening, Disp: 90 tablet, Rfl: 3    Social History     Tobacco Use    Smoking status: Former Smoker     Types: Cigarettes    Smokeless tobacco: Never Used    Tobacco comment: 2 cigarettes or less daily as of 9/11/18   Substance Use Topics    Alcohol use: No       PHYSICAL EXAM    Vitals:   Vitals:    02/12/19 1255   BP: 132/60   BP Location: Left arm   Patient Position: Sitting   Cuff Size: Adult   Pulse: (!) 132   Resp: 16   Temp: (!) 97 °F (36 1 °C)   SpO2: 97%   Weight: 76 7 kg (169 lb)       Physical Exam   Constitutional: He is oriented to person, place, and time  He appears well-developed and well-nourished  No distress  HENT:   Head: Normocephalic and atraumatic  Cardiovascular: Regular rhythm and normal heart sounds  Tachycardia present  No murmur heard  Pulmonary/Chest: Effort normal and breath sounds normal  No respiratory distress  He has no wheezes  Musculoskeletal: He exhibits no edema  Neurological: He is alert and oriented to person, place, and time  Nursing note and vitals reviewed

## 2019-02-12 NOTE — ASSESSMENT & PLAN NOTE
Likely 2/2 using albuterol prior to coming in office  Rate slowly dropping  ECG done as patient also has history of controlled hypertension  Rate 108, normal axis and intervals, double humped P wave in III, non-specific T wave abnormality in V5-6

## 2019-02-12 NOTE — ASSESSMENT & PLAN NOTE
-BP is 132/60  -In the general population over 60, a goal BP <150/<90 is recommended  (JNC8, Grade A)  -Controlled  In the general population over 60 years, pharmacologic treatment for high BP resulting in lower achieved SBP (eg, <140 mm Hg) and if treatment is well tolerated and without adverse effects on health or quality of life, treatment does not need to be adjusted   (JNC 8, Grade E)    -12 lead ECG will be ordered to evaluate for LVH or old infarction   -Fasting metabolic panel with eGFR to evaluate for renal insufficiency, hyperglycemia, hypokalemia, hyperuricemia, or hypercalcemia    -Fasting lipid panel to evaluate for high LDL, low HDL, or high triglycerides  -Urinalysis to evaluate for proteinuria  -Hemoglobin level to evaluate for anemia or polycythemia, which may suggest secondary cause of complication  -TSH to evaluate for thyroid dysfunction

## 2019-02-12 NOTE — PATIENT INSTRUCTIONS
To Do List:  1  Blood work and chest X-ray  Go to [facility] main entrance, make first left  2  Schedule appointment with gastroenterologist for evaluation for colonoscopy  3  Schedule appointment for abdominal aortic aneurysm screening ultrasound  4   Schedule pulmonary function testing

## 2019-02-19 ENCOUNTER — TRANSCRIBE ORDERS (OUTPATIENT)
Dept: ADMINISTRATIVE | Facility: HOSPITAL | Age: 74
End: 2019-02-19

## 2019-03-04 ENCOUNTER — HOSPITAL ENCOUNTER (OUTPATIENT)
Dept: PULMONOLOGY | Facility: HOSPITAL | Age: 74
Discharge: HOME | End: 2019-03-04
Payer: COMMERCIAL

## 2019-03-04 ENCOUNTER — APPOINTMENT (OUTPATIENT)
Dept: LAB | Facility: HOSPITAL | Age: 74
End: 2019-03-04
Payer: COMMERCIAL

## 2019-03-04 ENCOUNTER — HOSPITAL ENCOUNTER (OUTPATIENT)
Dept: RADIOLOGY | Facility: HOSPITAL | Age: 74
Discharge: HOME | End: 2019-03-04
Payer: COMMERCIAL

## 2019-03-04 DIAGNOSIS — J45.990 EXERCISE-INDUCED ASTHMA: ICD-10-CM

## 2019-03-04 DIAGNOSIS — I10 ESSENTIAL HYPERTENSION: ICD-10-CM

## 2019-03-04 LAB
ALBUMIN SERPL BCP-MCNC: 4.5 G/DL (ref 3–5.2)
ALP SERPL-CCNC: 102 U/L (ref 43–122)
ALT SERPL W P-5'-P-CCNC: 26 U/L (ref 9–52)
ANION GAP SERPL CALCULATED.3IONS-SCNC: 11 MMOL/L (ref 5–14)
AST SERPL W P-5'-P-CCNC: 20 U/L (ref 17–59)
BASOPHILS # BLD AUTO: 0.1 THOUSANDS/ΜL (ref 0–0.1)
BASOPHILS NFR BLD AUTO: 1 % (ref 0–1)
BILIRUB SERPL-MCNC: 0.6 MG/DL
BILIRUB UR QL STRIP: NEGATIVE
BUN SERPL-MCNC: 24 MG/DL (ref 5–25)
CALCIUM SERPL-MCNC: 10.1 MG/DL (ref 8.4–10.2)
CHLORIDE SERPL-SCNC: 100 MMOL/L (ref 97–108)
CHOLEST SERPL-MCNC: 204 MG/DL
CLARITY UR: CLEAR
CO2 SERPL-SCNC: 28 MMOL/L (ref 22–30)
COLOR UR: ABNORMAL
CREAT SERPL-MCNC: 1.31 MG/DL (ref 0.7–1.5)
EOSINOPHIL # BLD AUTO: 0 THOUSAND/ΜL (ref 0–0.4)
EOSINOPHIL NFR BLD AUTO: 1 % (ref 0–6)
ERYTHROCYTE [DISTWIDTH] IN BLOOD BY AUTOMATED COUNT: 12.8 %
EST. AVERAGE GLUCOSE BLD GHB EST-MCNC: 123 MG/DL
GFR SERPL CREATININE-BSD FRML MDRD: 54 ML/MIN/1.73SQ M
GLUCOSE P FAST SERPL-MCNC: 110 MG/DL (ref 70–99)
GLUCOSE UR STRIP-MCNC: NEGATIVE MG/DL
HBA1C MFR BLD: 5.9 % (ref 4.2–6.3)
HCT VFR BLD AUTO: 45.7 % (ref 41–53)
HDLC SERPL-MCNC: 37 MG/DL (ref 40–59)
HGB BLD-MCNC: 15.2 G/DL (ref 13.5–17.5)
HGB UR QL STRIP.AUTO: NEGATIVE
KETONES UR STRIP-MCNC: NEGATIVE MG/DL
LDLC SERPL CALC-MCNC: 147 MG/DL
LEUKOCYTE ESTERASE UR QL STRIP: NEGATIVE
LYMPHOCYTES # BLD AUTO: 1.2 THOUSANDS/ΜL (ref 0.5–4)
LYMPHOCYTES NFR BLD AUTO: 21 % (ref 25–45)
MCH RBC QN AUTO: 30.5 PG (ref 26–34)
MCHC RBC AUTO-ENTMCNC: 33.3 G/DL (ref 31–36)
MCV RBC AUTO: 92 FL (ref 80–100)
MONOCYTES # BLD AUTO: 0.5 THOUSAND/ΜL (ref 0.2–0.9)
MONOCYTES NFR BLD AUTO: 9 % (ref 1–10)
NEUTROPHILS # BLD AUTO: 3.9 THOUSANDS/ΜL (ref 1.8–7.8)
NEUTS SEG NFR BLD AUTO: 68 % (ref 45–65)
NITRITE UR QL STRIP: NEGATIVE
NONHDLC SERPL-MCNC: 167 MG/DL
PH UR STRIP.AUTO: 5 [PH] (ref 4.5–8)
PLATELET # BLD AUTO: 401 THOUSANDS/UL (ref 150–450)
PMV BLD AUTO: 7.1 FL (ref 8.9–12.7)
POTASSIUM SERPL-SCNC: 5.1 MMOL/L (ref 3.6–5)
PROT SERPL-MCNC: 8.1 G/DL (ref 5.9–8.4)
PROT UR STRIP-MCNC: NEGATIVE MG/DL
RBC # BLD AUTO: 4.99 MILLION/UL (ref 4.5–5.9)
SODIUM SERPL-SCNC: 139 MMOL/L (ref 137–147)
SP GR UR STRIP.AUTO: 1.02 (ref 1–1.04)
TRIGL SERPL-MCNC: 102 MG/DL
TSH SERPL DL<=0.05 MIU/L-ACNC: 1.68 UIU/ML (ref 0.47–4.68)
UROBILINOGEN UA: NEGATIVE MG/DL
WBC # BLD AUTO: 5.7 THOUSAND/UL (ref 4.5–11)

## 2019-03-04 PROCEDURE — 94060 EVALUATION OF WHEEZING: CPT

## 2019-03-04 PROCEDURE — 80053 COMPREHEN METABOLIC PANEL: CPT

## 2019-03-04 PROCEDURE — 94060 EVALUATION OF WHEEZING: CPT | Performed by: INTERNAL MEDICINE

## 2019-03-04 PROCEDURE — 71046 X-RAY EXAM CHEST 2 VIEWS: CPT

## 2019-03-04 PROCEDURE — 85025 COMPLETE CBC W/AUTO DIFF WBC: CPT

## 2019-03-04 PROCEDURE — 36415 COLL VENOUS BLD VENIPUNCTURE: CPT

## 2019-03-04 PROCEDURE — 83036 HEMOGLOBIN GLYCOSYLATED A1C: CPT

## 2019-03-04 PROCEDURE — 80061 LIPID PANEL: CPT

## 2019-03-04 PROCEDURE — 94760 N-INVAS EAR/PLS OXIMETRY 1: CPT

## 2019-03-04 PROCEDURE — 81003 URINALYSIS AUTO W/O SCOPE: CPT

## 2019-03-04 PROCEDURE — 84443 ASSAY THYROID STIM HORMONE: CPT

## 2019-03-18 PROBLEM — J44.9 COPD (CHRONIC OBSTRUCTIVE PULMONARY DISEASE) (HCC): Status: ACTIVE | Noted: 2018-09-11

## 2019-03-19 ENCOUNTER — TELEPHONE (OUTPATIENT)
Dept: FAMILY MEDICINE CLINIC | Facility: CLINIC | Age: 74
End: 2019-03-19

## 2019-03-19 ENCOUNTER — OFFICE VISIT (OUTPATIENT)
Dept: FAMILY MEDICINE CLINIC | Facility: CLINIC | Age: 74
End: 2019-03-19

## 2019-03-19 VITALS
TEMPERATURE: 98.4 F | RESPIRATION RATE: 20 BRPM | DIASTOLIC BLOOD PRESSURE: 66 MMHG | HEART RATE: 110 BPM | OXYGEN SATURATION: 95 % | SYSTOLIC BLOOD PRESSURE: 150 MMHG | HEIGHT: 69 IN | WEIGHT: 174 LBS | BODY MASS INDEX: 25.77 KG/M2

## 2019-03-19 DIAGNOSIS — J44.9 CHRONIC OBSTRUCTIVE PULMONARY DISEASE, UNSPECIFIED COPD TYPE (HCC): Primary | ICD-10-CM

## 2019-03-19 DIAGNOSIS — J45.990 EXERCISE-INDUCED ASTHMA: ICD-10-CM

## 2019-03-19 DIAGNOSIS — Z12.2 ENCOUNTER FOR SCREENING FOR LUNG CANCER: ICD-10-CM

## 2019-03-19 DIAGNOSIS — I10 ESSENTIAL HYPERTENSION: ICD-10-CM

## 2019-03-19 DIAGNOSIS — R06.02 SHORTNESS OF BREATH: ICD-10-CM

## 2019-03-19 PROCEDURE — 99213 OFFICE O/P EST LOW 20 MIN: CPT | Performed by: FAMILY MEDICINE

## 2019-03-19 RX ORDER — AMLODIPINE BESYLATE 5 MG/1
5 TABLET ORAL EVERY MORNING
Qty: 90 TABLET | Refills: 3 | Status: SHIPPED | OUTPATIENT
Start: 2019-03-19 | End: 2019-05-07 | Stop reason: SDUPTHER

## 2019-03-19 RX ORDER — LISINOPRIL 20 MG/1
20 TABLET ORAL EVERY EVENING
Qty: 90 TABLET | Refills: 3 | Status: SHIPPED | OUTPATIENT
Start: 2019-03-19 | End: 2019-05-07 | Stop reason: SDUPTHER

## 2019-03-19 NOTE — ASSESSMENT & PLAN NOTE
PFT consistent with COPD  This does explain his constant appearance of shortness of breath, which is worse with exertion, but does not explain his resting tachycardia  Echocardiogram ordered to evaluate for heart failure  Oxygen testing done in office (patient walked around the perimeter twice)  At rest: 120 / 95%   After walkin / 92%

## 2019-03-19 NOTE — PROGRESS NOTES
Assessment/Plan:    COPD (chronic obstructive pulmonary disease) ([facility])  PFT consistent with COPD  This does explain his constant appearance of shortness of breath, which is worse with exertion, but does not explain his resting tachycardia  Echocardiogram ordered to evaluate for heart failure  Oxygen testing done in office (patient walked around the perimeter twice)  At rest: 120 / 95%  After walkin / 92%       Diagnoses and all orders for this visit:    Chronic obstructive pulmonary disease, unspecified COPD type ([facility] [address] )  -     Echo complete with contrast if indicated; Future    Essential hypertension  -     amLODIPine (NORVASC) 5 mg tablet; Take 1 tablet (5 mg total) by mouth every morning  -     lisinopril (ZESTRIL) 20 mg tablet; Take 1 tablet (20 mg total) by mouth every evening    Encounter for screening for lung cancer  -     CT lung screening program; Future    Shortness of breath  -     Echo complete with contrast if indicated; Future    Exercise-induced asthma          Subjective:      Patient ID: Klaudia Sexton  is a 68 y o  male  Patient went for CXR, PFTs, and blood work  Offers no new complaints  Interested in scheduling colonoscopy  Said that since I asked him about it last time (during ROS) he has been checking his stool to see if there is any blood and hasn't seen any  Advised him to wait for colonoscopy until we finish working on his shortness of breath and tachycardia  The following portions of the patient's history were reviewed and updated as appropriate: allergies, current medications, past family history, past medical history, past social history, past surgical history and problem list     Review of Systems   Constitutional: Negative for chills and fever  Respiratory: Negative for shortness of breath  Cardiovascular: Negative for chest pain  Gastrointestinal: Negative for abdominal pain, anal bleeding, blood in stool and constipation           Objective:      /66 (BP Location: Right arm, Patient Position: Sitting, Cuff Size: Standard)   Pulse (!) 110   Temp 98 4 °F (36 9 °C) (Temporal)   Resp 20   Ht 5' 9" (1 753 m)   Wt 78 9 kg (174 lb)   SpO2 95%   BMI 25 70 kg/m²          Physical Exam   Constitutional: He is oriented to person, place, and time  He appears well-developed and well-nourished  No distress  HENT:   Head: Normocephalic and atraumatic  Cardiovascular: Normal rate, regular rhythm and normal heart sounds  No murmur heard  Pulmonary/Chest: No respiratory distress  He has decreased breath sounds  He has no wheezes  Increased effort with conversation and walking  Neurological: He is alert and oriented to person, place, and time  Nursing note and vitals reviewed

## 2019-04-04 ENCOUNTER — HOSPITAL ENCOUNTER (OUTPATIENT)
Dept: CT IMAGING | Facility: HOSPITAL | Age: 74
Discharge: HOME | End: 2019-04-04
Payer: COMMERCIAL

## 2019-04-04 DIAGNOSIS — Z12.2 ENCOUNTER FOR SCREENING FOR LUNG CANCER: ICD-10-CM

## 2019-04-22 ENCOUNTER — HOSPITAL ENCOUNTER (OUTPATIENT)
Dept: ULTRASOUND IMAGING | Facility: HOSPITAL | Age: 74
Discharge: HOME | End: 2019-04-22
Payer: COMMERCIAL

## 2019-04-22 DIAGNOSIS — Z13.6 ENCOUNTER FOR ABDOMINAL AORTIC ANEURYSM (AAA) SCREENING: ICD-10-CM

## 2019-04-22 PROCEDURE — 76706 US ABDL AORTA SCREEN AAA: CPT

## 2019-04-29 ENCOUNTER — HOSPITAL ENCOUNTER (OUTPATIENT)
Dept: NON INVASIVE DIAGNOSTICS | Facility: HOSPITAL | Age: 74
Discharge: HOME | End: 2019-04-29
Payer: COMMERCIAL

## 2019-04-29 DIAGNOSIS — J44.9 CHRONIC OBSTRUCTIVE PULMONARY DISEASE, UNSPECIFIED COPD TYPE (HCC): ICD-10-CM

## 2019-04-29 DIAGNOSIS — R06.02 SHORTNESS OF BREATH: ICD-10-CM

## 2019-04-29 DIAGNOSIS — J45.990 EXERCISE-INDUCED ASTHMA: ICD-10-CM

## 2019-04-29 PROCEDURE — 93306 TTE W/DOPPLER COMPLETE: CPT

## 2019-04-29 PROCEDURE — 93306 TTE W/DOPPLER COMPLETE: CPT | Performed by: INTERNAL MEDICINE

## 2019-05-07 ENCOUNTER — OFFICE VISIT (OUTPATIENT)
Dept: FAMILY MEDICINE CLINIC | Facility: CLINIC | Age: 74
End: 2019-05-07

## 2019-05-07 VITALS
SYSTOLIC BLOOD PRESSURE: 140 MMHG | BODY MASS INDEX: 24.88 KG/M2 | HEART RATE: 105 BPM | OXYGEN SATURATION: 98 % | WEIGHT: 168 LBS | RESPIRATION RATE: 18 BRPM | DIASTOLIC BLOOD PRESSURE: 62 MMHG | HEIGHT: 69 IN | TEMPERATURE: 97.2 F

## 2019-05-07 DIAGNOSIS — J45.990 EXERCISE-INDUCED ASTHMA: ICD-10-CM

## 2019-05-07 DIAGNOSIS — I10 ESSENTIAL HYPERTENSION: ICD-10-CM

## 2019-05-07 DIAGNOSIS — Z12.11 SCREENING FOR COLON CANCER: ICD-10-CM

## 2019-05-07 DIAGNOSIS — J44.9 CHRONIC OBSTRUCTIVE PULMONARY DISEASE, UNSPECIFIED COPD TYPE (HCC): Primary | ICD-10-CM

## 2019-05-07 DIAGNOSIS — R79.89 ELEVATED SERUM CREATININE: ICD-10-CM

## 2019-05-07 PROBLEM — E78.5 HYPERLIPIDEMIA: Status: ACTIVE | Noted: 2019-05-07

## 2019-05-07 PROCEDURE — 99213 OFFICE O/P EST LOW 20 MIN: CPT | Performed by: INTERNAL MEDICINE

## 2019-05-07 RX ORDER — ALBUTEROL SULFATE 90 UG/1
2 AEROSOL, METERED RESPIRATORY (INHALATION) EVERY 6 HOURS PRN
Qty: 18 G | Refills: 2 | Status: SHIPPED | OUTPATIENT
Start: 2019-05-07 | End: 2019-08-27 | Stop reason: SDUPTHER

## 2019-05-07 RX ORDER — AMLODIPINE BESYLATE 5 MG/1
5 TABLET ORAL EVERY MORNING
Qty: 90 TABLET | Refills: 3 | Status: SHIPPED | OUTPATIENT
Start: 2019-05-07 | End: 2019-05-22

## 2019-05-07 RX ORDER — LISINOPRIL 20 MG/1
20 TABLET ORAL EVERY EVENING
Qty: 90 TABLET | Refills: 3 | Status: SHIPPED | OUTPATIENT
Start: 2019-05-07 | End: 2019-08-27 | Stop reason: SDUPTHER

## 2019-05-08 ENCOUNTER — TELEPHONE (OUTPATIENT)
Dept: FAMILY MEDICINE CLINIC | Facility: CLINIC | Age: 74
End: 2019-05-08

## 2019-05-09 ENCOUNTER — TRANSCRIBE ORDERS (OUTPATIENT)
Dept: ADMINISTRATIVE | Facility: HOSPITAL | Age: 74
End: 2019-05-09

## 2019-05-09 ENCOUNTER — APPOINTMENT (OUTPATIENT)
Dept: LAB | Facility: HOSPITAL | Age: 74
End: 2019-05-09
Payer: COMMERCIAL

## 2019-05-09 DIAGNOSIS — R79.89 ELEVATED SERUM CREATININE: ICD-10-CM

## 2019-05-09 LAB
ANION GAP SERPL CALCULATED.3IONS-SCNC: 12 MMOL/L (ref 5–14)
BACTERIA UR QL AUTO: ABNORMAL /HPF
BILIRUB UR QL STRIP: NEGATIVE
BUN SERPL-MCNC: 25 MG/DL (ref 5–25)
CALCIUM SERPL-MCNC: 9.9 MG/DL (ref 8.4–10.2)
CHLORIDE SERPL-SCNC: 100 MMOL/L (ref 97–108)
CLARITY UR: CLEAR
CO2 SERPL-SCNC: 28 MMOL/L (ref 22–30)
COLOR UR: ABNORMAL
CREAT SERPL-MCNC: 1.36 MG/DL (ref 0.7–1.5)
GFR SERPL CREATININE-BSD FRML MDRD: 52 ML/MIN/1.73SQ M
GLUCOSE P FAST SERPL-MCNC: 107 MG/DL (ref 70–99)
GLUCOSE UR STRIP-MCNC: NEGATIVE MG/DL
HGB UR QL STRIP.AUTO: NEGATIVE
KETONES UR STRIP-MCNC: NEGATIVE MG/DL
LEUKOCYTE ESTERASE UR QL STRIP: NEGATIVE
MUCOUS THREADS UR QL AUTO: ABNORMAL
NITRITE UR QL STRIP: NEGATIVE
NON-SQ EPI CELLS URNS QL MICRO: ABNORMAL /HPF
OSMOLALITY UR: 828 MMOL/KG
PH UR STRIP.AUTO: 5 [PH]
POTASSIUM SERPL-SCNC: 4.5 MMOL/L (ref 3.6–5)
PROT UR STRIP-MCNC: ABNORMAL MG/DL
RBC #/AREA URNS AUTO: ABNORMAL /HPF
SODIUM 24H UR-SCNC: 75 MOL/L
SODIUM SERPL-SCNC: 140 MMOL/L (ref 137–147)
SP GR UR STRIP.AUTO: 1.02 (ref 1–1.04)
UROBILINOGEN UA: NEGATIVE MG/DL
WBC #/AREA URNS AUTO: ABNORMAL /HPF

## 2019-05-09 PROCEDURE — 81001 URINALYSIS AUTO W/SCOPE: CPT | Performed by: FAMILY MEDICINE

## 2019-05-09 PROCEDURE — 36415 COLL VENOUS BLD VENIPUNCTURE: CPT

## 2019-05-09 PROCEDURE — 83935 ASSAY OF URINE OSMOLALITY: CPT | Performed by: FAMILY MEDICINE

## 2019-05-09 PROCEDURE — 84300 ASSAY OF URINE SODIUM: CPT | Performed by: FAMILY MEDICINE

## 2019-05-09 PROCEDURE — 80048 BASIC METABOLIC PNL TOTAL CA: CPT

## 2019-05-15 ENCOUNTER — DOCUMENTATION (OUTPATIENT)
Dept: OTHER | Facility: HOSPITAL | Age: 74
End: 2019-05-15

## 2019-05-21 ENCOUNTER — APPOINTMENT (OUTPATIENT)
Dept: LAB | Facility: HOSPITAL | Age: 74
End: 2019-05-21
Payer: COMMERCIAL

## 2019-05-21 LAB
CREAT UR-MCNC: 319 MG/DL
MICROALBUMIN UR-MCNC: 23.9 MG/L (ref 0–20)
MICROALBUMIN/CREAT 24H UR: 7 MG/G CREATININE (ref 0–30)

## 2019-05-21 PROCEDURE — 82043 UR ALBUMIN QUANTITATIVE: CPT | Performed by: FAMILY MEDICINE

## 2019-05-21 PROCEDURE — 82570 ASSAY OF URINE CREATININE: CPT | Performed by: FAMILY MEDICINE

## 2019-05-22 ENCOUNTER — TELEPHONE (OUTPATIENT)
Dept: LABOR AND DELIVERY | Facility: HOSPITAL | Age: 74
End: 2019-05-22

## 2019-05-22 DIAGNOSIS — R79.89 ELEVATED SERUM CREATININE: Primary | ICD-10-CM

## 2019-05-22 DIAGNOSIS — Z12.11 SCREENING FOR COLON CANCER: ICD-10-CM

## 2019-05-22 DIAGNOSIS — H91.10 PRESBYCUSIS, UNSPECIFIED LATERALITY: ICD-10-CM

## 2019-06-12 ENCOUNTER — CLINICAL SUPPORT (OUTPATIENT)
Dept: FAMILY MEDICINE CLINIC | Facility: CLINIC | Age: 74
End: 2019-06-12

## 2019-06-12 VITALS — HEART RATE: 100 BPM | SYSTOLIC BLOOD PRESSURE: 140 MMHG | DIASTOLIC BLOOD PRESSURE: 60 MMHG

## 2019-06-12 DIAGNOSIS — I10 ESSENTIAL HYPERTENSION: Primary | ICD-10-CM

## 2019-06-20 ENCOUNTER — OFFICE VISIT (OUTPATIENT)
Dept: FAMILY MEDICINE CLINIC | Facility: CLINIC | Age: 74
End: 2019-06-20

## 2019-06-20 VITALS
HEART RATE: 96 BPM | BODY MASS INDEX: 24.81 KG/M2 | RESPIRATION RATE: 16 BRPM | WEIGHT: 168 LBS | TEMPERATURE: 98.2 F | DIASTOLIC BLOOD PRESSURE: 68 MMHG | SYSTOLIC BLOOD PRESSURE: 142 MMHG | OXYGEN SATURATION: 96 %

## 2019-06-20 DIAGNOSIS — H61.22 IMPACTED CERUMEN OF LEFT EAR: Primary | ICD-10-CM

## 2019-06-20 PROCEDURE — 99213 OFFICE O/P EST LOW 20 MIN: CPT | Performed by: FAMILY MEDICINE

## 2019-06-20 RX ORDER — AMLODIPINE BESYLATE 5 MG/1
5 TABLET ORAL DAILY
COMMUNITY
End: 2019-08-27 | Stop reason: SDUPTHER

## 2019-07-02 ENCOUNTER — DOCTOR'S OFFICE (OUTPATIENT)
Dept: URBAN - METROPOLITAN AREA CLINIC 136 | Facility: CLINIC | Age: 74
Setting detail: OPHTHALMOLOGY
End: 2019-07-02
Payer: MEDICARE

## 2019-07-02 DIAGNOSIS — H35.371: ICD-10-CM

## 2019-07-02 DIAGNOSIS — F17.200: ICD-10-CM

## 2019-07-02 DIAGNOSIS — Z96.1: ICD-10-CM

## 2019-07-02 DIAGNOSIS — H26.40: ICD-10-CM

## 2019-07-02 DIAGNOSIS — H35.3131: ICD-10-CM

## 2019-07-02 PROCEDURE — 92134 CPTRZ OPH DX IMG PST SGM RTA: CPT | Performed by: OPHTHALMOLOGY

## 2019-07-02 PROCEDURE — 92014 COMPRE OPH EXAM EST PT 1/>: CPT | Performed by: OPHTHALMOLOGY

## 2019-07-02 ASSESSMENT — REFRACTION_MANIFEST
OS_CYLINDER: -1.75
OS_SPHERE: +2.00
OD_VA1: 20/
OD_ADD: +3.00
OS_VA2: 20/25
OD_VA3: 20/
OU_VA: 20/40
OD_VA1: 20/40
OD_VA2: 20/
OD_VA2: 20/25
OS_VA2: 20/
OU_VA: 20/
OD_VA3: 20/
OS_ADD: +3.00
OS_VA3: 20/
OD_AXIS: 180
OS_VA1: 20/50
OD_SPHERE: +0.75
OS_VA1: 20/
OD_CYLINDER: -2.00
OS_AXIS: 010
OS_VA3: 20/

## 2019-07-02 ASSESSMENT — CONFRONTATIONAL VISUAL FIELD TEST (CVF)
OS_FINDINGS: FULL
OD_FINDINGS: FULL
OS_COMMENTS: UNABLE
OD_COMMENTS: UNABLE

## 2019-07-02 ASSESSMENT — REFRACTION_AUTOREFRACTION
OD_CYLINDER: -2.00
OS_SPHERE: +2.50
OS_AXIS: 017
OD_SPHERE: +1.75
OD_AXIS: 010
OS_CYLINDER: -2.00

## 2019-07-02 ASSESSMENT — SPHEQUIV_DERIVED
OS_SPHEQUIV: 1.125
OS_SPHEQUIV: 1.5
OD_SPHEQUIV: 0.75
OD_SPHEQUIV: -0.25

## 2019-07-02 ASSESSMENT — REFRACTION_CURRENTRX
OS_OVR_VA: 20/
OD_OVR_VA: 20/
OS_OVR_VA: 20/
OD_OVR_VA: 20/
OS_OVR_VA: 20/
OD_OVR_VA: 20/

## 2019-07-02 ASSESSMENT — VISUAL ACUITY
OD_BCVA: 20/50+1
OS_BCVA: 20/50-2

## 2019-07-09 ENCOUNTER — TELEPHONE (OUTPATIENT)
Dept: FAMILY MEDICINE CLINIC | Facility: CLINIC | Age: 74
End: 2019-07-09

## 2019-07-09 NOTE — TELEPHONE ENCOUNTER
Pt requesting refill for following medication      tiotropium (SPIRIVA RESPIMAT) 1 25 MCG/ACT AERS inhaler

## 2019-07-09 NOTE — TELEPHONE ENCOUNTER
Tech at pharmacy advised me pt still has one refill left  It will be filled and ready for pt to       LM on VM advising pt he can  refill at pharmacy

## 2019-07-19 ENCOUNTER — TELEPHONE (OUTPATIENT)
Dept: FAMILY MEDICINE CLINIC | Facility: CLINIC | Age: 74
End: 2019-07-19

## 2019-07-19 NOTE — TELEPHONE ENCOUNTER
LM and orders mailed to address on file    Pt s/x   [address] [place] [address]   Phone: [phone]   8/5 @ 1:30pm    [facility]   [address] [place] [address]   Phone: [phone]   8/27 @ 10:30am    [address]   Phone: [phone]   8/5 @ 1:15pm

## 2019-07-25 ENCOUNTER — TELEPHONE (OUTPATIENT)
Dept: FAMILY MEDICINE CLINIC | Facility: CLINIC | Age: 74
End: 2019-07-25

## 2019-07-25 NOTE — TELEPHONE ENCOUNTER
R/s general s surgery appt for 8/12 @ 1:30pm   [address]   Phone: [phone]   All orders mailed to new address

## 2019-07-25 NOTE — TELEPHONE ENCOUNTER
Pt phoned in stating he did not receive info yet in mail regarding specialist appts  He is now using a [employer]  Box for his email  Demographis updated  Please resend into to pt

## 2019-08-12 ENCOUNTER — OFFICE VISIT (OUTPATIENT)
Dept: FAMILY MEDICINE CLINIC | Facility: CLINIC | Age: 74
End: 2019-08-12

## 2019-08-12 ENCOUNTER — APPOINTMENT (OUTPATIENT)
Dept: LAB | Facility: HOSPITAL | Age: 74
End: 2019-08-12
Payer: COMMERCIAL

## 2019-08-12 VITALS
WEIGHT: 172 LBS | DIASTOLIC BLOOD PRESSURE: 70 MMHG | HEIGHT: 69 IN | HEART RATE: 90 BPM | BODY MASS INDEX: 25.48 KG/M2 | TEMPERATURE: 98.4 F | RESPIRATION RATE: 20 BRPM | OXYGEN SATURATION: 95 % | SYSTOLIC BLOOD PRESSURE: 150 MMHG

## 2019-08-12 DIAGNOSIS — H91.10 PRESBYCUSIS, UNSPECIFIED LATERALITY: ICD-10-CM

## 2019-08-12 DIAGNOSIS — R79.89 ELEVATED SERUM CREATININE: ICD-10-CM

## 2019-08-12 DIAGNOSIS — I10 ESSENTIAL HYPERTENSION: Primary | ICD-10-CM

## 2019-08-12 LAB
ANION GAP SERPL CALCULATED.3IONS-SCNC: 6 MMOL/L (ref 5–14)
BUN SERPL-MCNC: 20 MG/DL (ref 5–25)
CALCIUM SERPL-MCNC: 9.8 MG/DL (ref 8.4–10.2)
CHLORIDE SERPL-SCNC: 98 MMOL/L (ref 97–108)
CO2 SERPL-SCNC: 31 MMOL/L (ref 22–30)
CREAT SERPL-MCNC: 1.12 MG/DL (ref 0.7–1.5)
GFR SERPL CREATININE-BSD FRML MDRD: 65 ML/MIN/1.73SQ M
GLUCOSE SERPL-MCNC: 94 MG/DL (ref 70–99)
POTASSIUM SERPL-SCNC: 5.2 MMOL/L (ref 3.6–5)
SODIUM SERPL-SCNC: 135 MMOL/L (ref 137–147)

## 2019-08-12 PROCEDURE — 80048 BASIC METABOLIC PNL TOTAL CA: CPT

## 2019-08-12 PROCEDURE — 36415 COLL VENOUS BLD VENIPUNCTURE: CPT

## 2019-08-12 PROCEDURE — 3008F BODY MASS INDEX DOCD: CPT | Performed by: FAMILY MEDICINE

## 2019-08-12 PROCEDURE — 1160F RVW MEDS BY RX/DR IN RCRD: CPT | Performed by: FAMILY MEDICINE

## 2019-08-12 PROCEDURE — 99213 OFFICE O/P EST LOW 20 MIN: CPT | Performed by: FAMILY MEDICINE

## 2019-08-12 NOTE — PROGRESS NOTES
[facility]    Patient ID: Cindy Taylor  is a 68 y o  male  Reason for office visit: F/U    /70 (BP Location: Left arm, Patient Position: Sitting, Cuff Size: Standard)   Pulse 90   Temp 98 4 °F (36 9 °C) (Temporal)   Resp 20   Ht 5' 9" (1 753 m)   Wt 78 kg (172 lb)   SpO2 95%   BMI 25 40 kg/m²     Assessment/Plan  1  Essential hypertension     2  Presbycusis, unspecified laterality     3  Elevated serum creatinine  Basic metabolic panel     -BP is 150/70  -Goal BP <140/<90 is recommended for patients with risk factors and/or comorbid conditions   -Sub-optimal control - patient is near target and would like to try optimizing lifestyle modification  -Explained DASH diet again  He did not know to count the sodium in foods he is eating, thought not adding salt was sufficient     -Advised to call audiology to schedule appointment for hearing aids  Appears to be miscommunication in the past     -Follow-up creatinine since patient increased water intake  -Explained to avoid NSAIDs  Andre Councilman he is aware and only takes the prescribed medications  Does not use pain relieves, says he usually waits for it to pass if he has any pain or headache  Advised to use Tylenol if he needs something   -Follow-up with nephrology as previously planned     -Colonoscopy evaluation appointment coming up  Subjective  -Information obtained from the patient   -Language: English    -Increased fluid intake to 0 5 gallons per day  Measures daily intake with a half gallon container   -Reports symptomatic improvement since starting Spiriva  Using albuterol rarely now  -Tolerating medications well  Denies side effects  Offers no complaints  Social History  - reports that he has quit smoking  His smoking use included cigarettes  He has never used smokeless tobacco   - reports that he does not drink alcohol    - reports that he does not use drugs      Review of Systems   Constitutional: Negative for activity change, appetite change, chills and fever  Respiratory: Negative for shortness of breath  Cardiovascular: Negative for chest pain  Gastrointestinal: Negative for blood in stool, nausea and vomiting  Genitourinary: Negative for difficulty urinating and dysuria  Psychiatric/Behavioral: Negative for behavioral problems  Pertinent items are noted in HPI  The following have been reviewed and updated: current medications    No Known Allergies      Current Outpatient Medications:     albuterol (VENTOLIN HFA) 90 mcg/act inhaler, Inhale 2 puffs every 6 (six) hours as needed for wheezing or shortness of breath (15 minutes before exercise), Disp: 18 g, Rfl: 2    amLODIPine (NORVASC) 5 mg tablet, Take 5 mg by mouth daily, Disp: , Rfl:     lisinopril (ZESTRIL) 20 mg tablet, Take 1 tablet (20 mg total) by mouth every evening, Disp: 90 tablet, Rfl: 3    tiotropium (SPIRIVA RESPIMAT) 1 25 MCG/ACT AERS inhaler, Inhale 2 puffs daily, Disp: 3 Inhaler, Rfl: 3    Patient Active Problem List   Diagnosis    Presbycusis    Osteopenia    Essential hypertension    COPD (chronic obstructive pulmonary disease) (HCC)    Tachycardia    Screening for colon cancer    Hyperlipidemia    Elevated serum creatinine     Past Surgical History:   Procedure Laterality Date    APPENDECTOMY      HERNIA REPAIR      STRABISMUS SURGERY       No family history on file      Health Maintenance   Topic Date Due    Hepatitis C Screening  1945   [employer] Annual Wellness Visit (AWV)  1945    CRC Screening: Colonoscopy  1945    INFLUENZA VACCINE  07/01/2019    Fall Risk  09/11/2019    Depression Screening PHQ  09/11/2019    Pneumococcal Vaccine: 65+ Years (2 of 2 - PPSV23) 09/11/2019    BMI: Adult  08/05/2020    DTaP,Tdap,and Td Vaccines (3 - Td) 12/21/2026    Pneumococcal Vaccine: Pediatrics (0 to 5 Years) and At-Risk Patients (6 to 59 Years)  Aged Out    HEPATITIS B VACCINES  Aged Out Objective  Physical Exam   Constitutional: He is oriented to person, place, and time  He appears well-developed and well-nourished  No distress  HENT:   Head: Normocephalic and atraumatic  Eyes: Conjunctivae are normal    Neck: Normal range of motion  Cardiovascular: Normal rate, regular rhythm and normal heart sounds  No murmur heard  Pulmonary/Chest: Effort normal and breath sounds normal  No respiratory distress  He has no wheezes  Musculoskeletal: Normal range of motion  He exhibits no edema  Neurological: He is alert and oriented to person, place, and time  Psychiatric: He has a normal mood and affect  His behavior is normal    Nursing note and vitals reviewed

## 2019-08-27 ENCOUNTER — CONSULT (OUTPATIENT)
Dept: NEPHROLOGY | Facility: CLINIC | Age: 74
End: 2019-08-27
Payer: COMMERCIAL

## 2019-08-27 VITALS
HEIGHT: 69 IN | SYSTOLIC BLOOD PRESSURE: 158 MMHG | HEART RATE: 88 BPM | WEIGHT: 173 LBS | BODY MASS INDEX: 25.62 KG/M2 | DIASTOLIC BLOOD PRESSURE: 78 MMHG

## 2019-08-27 DIAGNOSIS — I10 ESSENTIAL HYPERTENSION: ICD-10-CM

## 2019-08-27 DIAGNOSIS — J45.990 EXERCISE-INDUCED ASTHMA: ICD-10-CM

## 2019-08-27 DIAGNOSIS — E78.5 HYPERLIPIDEMIA, UNSPECIFIED HYPERLIPIDEMIA TYPE: ICD-10-CM

## 2019-08-27 DIAGNOSIS — R79.89 ELEVATED SERUM CREATININE: ICD-10-CM

## 2019-08-27 DIAGNOSIS — J44.9 CHRONIC OBSTRUCTIVE PULMONARY DISEASE, UNSPECIFIED COPD TYPE (HCC): ICD-10-CM

## 2019-08-27 DIAGNOSIS — N18.2 CKD (CHRONIC KIDNEY DISEASE) STAGE 2, GFR 60-89 ML/MIN: Primary | ICD-10-CM

## 2019-08-27 PROCEDURE — 99204 OFFICE O/P NEW MOD 45 MIN: CPT | Performed by: INTERNAL MEDICINE

## 2019-08-27 RX ORDER — AMLODIPINE BESYLATE 5 MG/1
5 TABLET ORAL DAILY
Qty: 90 TABLET | Refills: 3 | Status: SHIPPED | OUTPATIENT
Start: 2019-08-27 | End: 2020-06-17 | Stop reason: SDUPTHER

## 2019-08-27 RX ORDER — LISINOPRIL 20 MG/1
20 TABLET ORAL EVERY EVENING
Qty: 90 TABLET | Refills: 3 | Status: SHIPPED | OUTPATIENT
Start: 2019-08-27 | End: 2020-06-17 | Stop reason: SDUPTHER

## 2019-08-27 RX ORDER — ALBUTEROL SULFATE 90 UG/1
2 AEROSOL, METERED RESPIRATORY (INHALATION) EVERY 6 HOURS PRN
Qty: 18 G | Refills: 2 | Status: SHIPPED | OUTPATIENT
Start: 2019-08-27 | End: 2020-06-17 | Stop reason: SDUPTHER

## 2019-08-27 NOTE — PROGRESS NOTES
Nephrology Initial Consultation  Julito Harrell  68 y o  male MRN: [id number]            REASON FOR CONSULTATION:  Julito Harrell  is a 68 y o male who was referred by Adalberto Farr MD for evaluation of Consult    ASSESSMENT / PLAN:   68 y o   male with pmh of multiple co-morbidities including hypertension  (x 5yrs), COPD, hyperlipidemia and CKD stage 2/3 presents to the office for evaluation and management of abnormal renal parameters  1  CKD stage 2/3:  - Patient has a baseline creatinine of 1 2-1 4 mg/dL  Most recent labs show a Creatinine of 1 12 mg/dL  Renal function remains stable  - likely has underlying CKD secondary to age-related nephron loss plus hypertensive nephrosclerosis  Will still rule out paraproteinemia and cystic disease   - Will check renal ultrasound to evaluate kidney size, echogenicity and r/o cysts  - Proteinuria - most recent microalbumin to creatinine ratio of 7 mg/dL in May 2019  Will check UA, spot urine protein and creatinine    - Acid base and lytes stable except borderline hyponatremia, alkalosis  - Clinically the patient appears to be euvolemic to hypovolemic  Advised patient to increase fluid intake  - Recommend to avoid use of NSAIDs, nephrotoxins  Caution advised with regards to exposure to IV contrast dye    - Discussed with the patient in depth his renal status, including the possible etiologies for CKD  - Advised the patient that when his GFR is close to 20mL/min then will start discussing about RRT(renal replacement therapy) options such as renal transplant, peritoneal dialysis and hemodialysis  - Informed the patient about the various options for Renal Replacement therapy  - Discussed with the patient how we need to work together to delay the progression of CKD with optimal BP control based on their age and co-morbidities and trying to reduce proteinuria by the use of anti-proteinuric agents       2  Hypertension:  - Patient is on lisinopril 20 mg po QHS  - restart Norvasc 5 mg p o  Q a m , check blood pressures and call with updates in 2 weeks  - Goal BP of <  140/90 based on age and comorbidities  - Instructed to follow low sodium (2gm)diet   - Advised to hold ACEI/ARBs if patient suffers from dehydration due to gastrointestinal losses due to risk of LILLY secondary to failure to autoregulate  3  Hemoglobin:  - Goal Hb of 10-12 g/dL  - Most recent labs suggestive of 15 2 g per dL  - no role for IV iron at this time    4  CKD-MBD(Mineral Bone Disease): - Based on patients CKD stage following is the goal of therapy  - Maintain calcium phosphorus product of < 55   - Stage 3 CKD - Goal Ca 8 5-10 mg/dL , goal Phos 2 7-4 6 mg/dL  , goal iPTH 30-70 pg/mL  - Patient is currently at goal   - Continue patient on no vitamin-D  - check intact PTH and vitamin-D    5  Lipids:  - goal LDL less than 70  - Management as per PCP    6  Hearing loss:  - management as per Primary team    7  COPD:  - Management as per primary team  - on inhalers    8  Nutrition:  - Encouraged patient to follow a renal diet comprising of moderate potassium, low phosphorus and protein restriction to 0 8gm/kg  - Will check serum albumin with next blood work  9  Followup:  - Patient is to follow-up in 4 months, with lab work to be performed after the visit and then again in a few days prior to the visit  Advised patient to call me in 10 days to review the results if they do not hear back from me, as I may have not received the results  Mena Beck MD, FASN, 8/27/2019, 11:00 AM             HISTORY OF PRESENT ILLNESS: 68 y o  male with past medical history of hypertension  (x 5yrs), COPD, hyperlipidemia and CKD stage 2/3 presents to the office for evaluation and management of abnormal renal parameters  Review of records shows that patient has had a baseline creatinine of 1 2-1 4 mg/dL dating as far back as March 2019   Review of old note from May 2000 19 shows that patient had a creatinine of 0 9 mg/dL in February of 2018  No h/o LILLY needing RRT or kidney stones  Hearing issues for about a year  Not taking norvasc for about 6 mths as per PCP, however the PCP note shows that he is to be taking it, I feel that it is confusion at he is not taking it  Not checking BP at home  No NSAID use  Happy with all the care information that he has gotten today  Is going to get hearing aids this week  Review of Systems   Constitutional: Negative for appetite change, fatigue and fever  HENT: Positive for hearing loss  Negative for congestion and sinus pressure  Respiratory: Negative for cough, shortness of breath and wheezing  Cardiovascular: Negative for chest pain, palpitations and leg swelling  Gastrointestinal: Negative for abdominal pain, constipation, diarrhea, nausea and vomiting  Genitourinary: Negative for difficulty urinating, dysuria and hematuria  Musculoskeletal: Negative for back pain  Skin: Negative for rash and wound  Neurological: Negative for dizziness, light-headedness and headaches  Psychiatric/Behavioral: Negative for confusion  All other systems reviewed and are negative  PAST MEDICAL HISTORY:  Past Medical History:   Diagnosis Date    CKD (chronic kidney disease)     Hypertension     Microalbuminuria        PROBLEM LIST    Patient Active Problem List   Diagnosis    Presbycusis    Osteopenia    Essential hypertension    COPD [facility]chronic obstructive pulmonary disease) ([facility] [address] )    Tachycardia    Screening for colon cancer    Hyperlipidemia    Elevated serum creatinine    CKD (chronic kidney disease) stage 2, GFR 60-89 ml/min       PAST SURGICAL HISTORY:  Past Surgical History:   Procedure Laterality Date    APPENDECTOMY      HERNIA REPAIR      STRABISMUS SURGERY         SOCIAL HISTORY :   reports that he has quit smoking  His smoking use included cigarettes   He has never used smokeless tobacco  He reports that he does not drink alcohol or use drugs  FAMILY HISTORY:  Family History   Problem Relation Age of Onset    No Known Problems Mother     No Known Problems Father        ALLERGIES:  No Known Allergies        PHYSICAL EXAM:  Vitals:    08/27/19 0958 08/27/19 1009   BP: 152/70 158/78   BP Location: Left arm    Patient Position: Sitting    Cuff Size: Adult    Pulse: 88    Weight: 78 5 kg (173 lb)    Height: 5' 9" (1 753 m)      Body mass index is 25 55 kg/m²  Physical Exam   Constitutional: He is oriented to person, place, and time  He appears well-developed and well-nourished  No distress  HENT:   Head: Normocephalic and atraumatic  Mouth/Throat: No oropharyngeal exudate  Dry mucous membrane   Eyes: Conjunctivae are normal  No scleral icterus  Neck: No JVD present  No tracheal deviation present  Cardiovascular: Normal heart sounds  Exam reveals no friction rub  Pulmonary/Chest: Effort normal  He has no wheezes  He has no rales  Abdominal: Soft  He exhibits no mass  There is no tenderness  Musculoskeletal: He exhibits no edema  Neurological: He is alert and oriented to person, place, and time  Skin: Skin is warm and dry  He is not diaphoretic  No pallor  Psychiatric: He has a normal mood and affect  His behavior is normal    Vitals reviewed          LABORATORY DATA:     Results from last 6 Months   Lab Units 08/12/19  1646 05/09/19  1017 03/04/19  0853   WBC Thousand/uL  --   --  5 70   HEMOGLOBIN g/dL  --   --  15 2   HEMATOCRIT %  --   --  45 7   PLATELETS Thousands/uL  --   --  401   POTASSIUM mmol/L 5 2* 4 5 5 1*   CHLORIDE mmol/L 98 100 100   CO2 mmol/L 31* 28 28   BUN mg/dL 20 25 24   CREATININE mg/dL 1 12 1 36 1 31   CALCIUM mg/dL 9 8 9 9 10 1        rest all reviewed    RADIOLOGY:  US retroperitoneal complete    (Results Pending)     Rest all reviewed        MEDICATIONS:    Current Outpatient Medications:     albuterol (VENTOLIN HFA) 90 mcg/act inhaler, Inhale 2 puffs every 6 (six) hours as needed for wheezing or shortness of breath (15 minutes before exercise), Disp: 18 g, Rfl: 2    lisinopril (ZESTRIL) 20 mg tablet, Take 1 tablet (20 mg total) by mouth every evening, Disp: 90 tablet, Rfl: 3    tiotropium (SPIRIVA RESPIMAT) 1 25 MCG/ACT AERS inhaler, Inhale 2 puffs daily, Disp: 3 Inhaler, Rfl: 3    amLODIPine (NORVASC) 5 mg tablet, Take 1 tablet (5 mg total) by mouth daily, Disp: 90 tablet, Rfl: 3        Portions of the record may have been created with voice recognition software  Occasional wrong word or "sound a like" substitutions may have occurred due to the inherent limitations of voice recognition software  Read the chart carefully and recognize, using context, where substitutions have occurred  If you have any questions, please contact the dictating provider

## 2019-08-27 NOTE — PATIENT INSTRUCTIONS
- start norvasc 5mg in the morning  - get blood work this week  - get kidney ultrasound before next visit  - check blood pressure and call with updates in 2 weeks    - Please call me in 10 days after having your blood work done to review the results if you do not hear back from me or my office, as I may have not received the results  - please remember to perform blood work prior to the next visit  - Please call if the blood pressure top number is greater than 150 or less than 110 consistently  - Please call if you are gaining more than 2lbs in 2 days for adjustment of water pills   ~ Please AVOID the following pain medications  LIST OF NSAIDS (NONSTEROIDAL ANTI-INFLAMMATORY DRUGS) AND CHRISTIANSON-2 INHIBITORS    DIFLUNISAL (DOLOBID)  IBUPROFEN (MOTRIN, ADVIL)  FLURBIPROFEN (ANSAID)  KETOPROFEN (ORUDIS, ORUVAIL)  FENOPROFEN (NALFON)  NABUMETONE (RELAFEN)  PIROXICAM (FELDENE)  NAPROXEN (ALEVE, NAPROSYN, NAPRELAN, ANAPROX)  DICLOFENAC (VOLTAREN, CATAFLAM)  INDOMETHACIN (INDOCIN)  SULINDAC (CLINORIL)  TOLMETIN (TOLETIN)  ETODOLAC (LODINE)  MELOXICAM (MOBIC)  KETOROLAC (TORADOL)  OXAPROZIN (DAYPRO)  CELECOXIB (CELEBREX)    Phosphorus diet  Follow a low phosphorus diet      Avoid these higher phosphorus foods: Choose these lower phosphorus foods:   Milk, pudding or yogurt (from animals and from many soy varieties) Rice milk (unfortified), nondairy creamer (if it doesn't have terms in the ingredients list that contain the letters "phos")   Hard cheeses, ricotta or cottage cheese, fat-free cream cheese Regular and low-fat cream cheese   Ice cream or frozen yogurt Sherbet or frozen fruit pops   Soups made with higher phosphorus ingredients (milk, dried peas, beans, lentils) Soups made with lower phosphorus ingredients (broth- or water-based with other lower phosphorus ingredients)   Whole grains, including whole-grain breads, crackers, cereal, rice and pasta Refined grains, including white bread, crackers, cereals, rice and pasta Quick breads, biscuits, cornbread, muffins, pancakes or waffles Homemade refined (white) dinner rolls, bagels or English muffins   Dried peas (split, black-eyed), beans (black, garbanzo, lima, kidney, navy, garcia) or lentils Green peas (canned, frozen), green beans or wax beans   Organ meats, walleye, pollock or sardines Lean beef, pork, lamb, poultry or other fish   Nuts and seeds Popcorn   Peanut butter and other nut butters Jam, jelly or honey   Chocolate, including chocolate drinks Carob (chocolate-flavored) candy, hard candy or gumdrops   Radha and pepper-type sodas, flavored mascorro, bottled teas (if a term in the ingredients list contains the letters "phos") Lemon-lime soda, ginger ale or root beer, plain water     Follow a moderate potassium diet

## 2019-09-03 ENCOUNTER — CONSULT (OUTPATIENT)
Dept: SURGERY | Facility: CLINIC | Age: 74
End: 2019-09-03
Payer: COMMERCIAL

## 2019-09-03 VITALS
DIASTOLIC BLOOD PRESSURE: 60 MMHG | SYSTOLIC BLOOD PRESSURE: 140 MMHG | TEMPERATURE: 97.1 F | HEIGHT: 69 IN | BODY MASS INDEX: 25.39 KG/M2 | HEART RATE: 88 BPM | WEIGHT: 171.4 LBS

## 2019-09-03 DIAGNOSIS — Z12.11 SCREENING FOR COLON CANCER: ICD-10-CM

## 2019-09-03 PROCEDURE — 99203 OFFICE O/P NEW LOW 30 MIN: CPT | Performed by: PHYSICIAN ASSISTANT

## 2019-09-03 RX ORDER — POLYETHYLENE GLYCOL 3350 17 G/17G
POWDER, FOR SOLUTION ORAL
Qty: 289 G | Refills: 0 | Status: SHIPPED | OUTPATIENT
Start: 2019-09-03 | End: 2019-12-18

## 2019-09-03 NOTE — PROGRESS NOTES
Assessment/Plan:   Aaron Sunshine  is a 68 y o male who is here for a:     First Screening Colonoscopy  Plan: Colonoscopy for: Screening for Colon Cancer        Previous Colonoscopy and  Location of polyps if any:   none        Preoperative Clearance: None        ______________________________________________________    HPI:  Aaron Sunshine  is a 68 y o male who was referred for evaluation of    First Screening  Currently:     Symptoms include:  no abdominal pain, change in bowel habits, or black or bloody stools  Family history of colon cancer: None reported             Anticoagulation: none    LABS:      Lab Results   Component Value Date    WBC 5 70 03/04/2019    HGB 15 2 03/04/2019    HCT 45 7 03/04/2019    MCV 92 03/04/2019     03/04/2019     Lab Results   Component Value Date    K 5 2 (H) 08/12/2019    CL 98 08/12/2019    CO2 31 (H) 08/12/2019    BUN 20 08/12/2019    CREATININE 1 12 08/12/2019    GLUF 107 (H) 05/09/2019    CALCIUM 9 8 08/12/2019    AST 20 03/04/2019    ALT 26 03/04/2019    ALKPHOS 102 03/04/2019    EGFR 65 08/12/2019     Lab Results   Component Value Date    HGBA1C 5 9 03/04/2019     No results found for: INR, PROTIME      No orders to display           ROS:  General ROS: negative for - chills, fatigue, fever or night sweats, weight loss  Respiratory ROS: no cough, shortness of breath, or wheezing  Cardiovascular ROS: no chest pain or dyspnea on exertion  Genito-Urinary ROS: no dysuria, trouble voiding, or hematuria  Musculoskeletal ROS: negative for - gait disturbance, joint pain or muscle pain  Neurological ROS: no TIA or stroke symptoms  GI ROS: see HPI  Skin ROS: no new rashes or lesions   Lymphatic ROS: no new adenopathy noted by pt  GYN ROS: see HPI, no new GYN history or bleeding noted  Psy ROS: no new mental or behavioral disturbances           Imaging: No new pertinent imaging studies           Patient Active Problem List   Diagnosis    Presbycusis    Osteopenia    Essential hypertension    COPD (chronic obstructive pulmonary disease) (HCC)    Tachycardia    Screening for colon cancer    Hyperlipidemia    Elevated serum creatinine    CKD (chronic kidney disease) stage 2, GFR 60-89 ml/min         Allergies:  Patient has no known allergies        Current Outpatient Medications:     albuterol (VENTOLIN HFA) 90 mcg/act inhaler, Inhale 2 puffs every 6 (six) hours as needed for wheezing or shortness of breath (15 minutes before exercise), Disp: 18 g, Rfl: 2    amLODIPine (NORVASC) 5 mg tablet, Take 1 tablet (5 mg total) by mouth daily, Disp: 90 tablet, Rfl: 3    lisinopril (ZESTRIL) 20 mg tablet, Take 1 tablet (20 mg total) by mouth every evening, Disp: 90 tablet, Rfl: 3    tiotropium (SPIRIVA RESPIMAT) 1 25 MCG/ACT AERS inhaler, Inhale 2 puffs daily, Disp: 3 Inhaler, Rfl: 3    bisacodyl (DULCOLAX) 5 mg EC tablet, Take 4 tables at 1 pm the day before your scheduled colonoscopy, Disp: 4 tablet, Rfl: 0    polyethylene glycol (GLYCOLAX) powder, Take as directed the day prior to colonoscopy, Disp: 289 g, Rfl: 0    Past Medical History:   Diagnosis Date    CKD (chronic kidney disease)     Hypertension     Microalbuminuria        Past Surgical History:   Procedure Laterality Date    APPENDECTOMY      HERNIA REPAIR      STRABISMUS SURGERY         Family History   Problem Relation Age of Onset    No Known Problems Mother     No Known Problems Father        Social History     Socioeconomic History    Marital status: Single     Spouse name: None    Number of children: None    Years of education: None    Highest education level: None   Occupational History    None   Social Needs    Financial resource strain: None    Food insecurity:     Worry: None     Inability: None    Transportation needs:     Medical: None     Non-medical: None   Tobacco Use    Smoking status: Former Smoker     Types: Cigarettes    Smokeless tobacco: Never Used  Tobacco comment: 2 cigarettes or less daily as of 9/11/18   Substance and Sexual Activity    Alcohol use: Yes     Comment: Rare    Drug use: No    Sexual activity: None   Lifestyle    Physical activity:     Days per week: None     Minutes per session: None    Stress: None   Relationships    Social connections:     Talks on phone: None     Gets together: None     Attends Restoration service: None     Active member of club or organization: None     Attends meetings of clubs or organizations: None     Relationship status: None    Intimate partner violence:     Fear of current or ex partner: None     Emotionally abused: None     Physically abused: None     Forced sexual activity: None   Other Topics Concern    None   Social History Narrative    None       Exam:   Vitals:    09/03/19 1110   BP: 140/60   Pulse: 88   Temp: (!) 97 1 °F (36 2 °C)           ______________________________________________________    PHYSICAL EXAM  General Appearance:    Alert, cooperative, no distress, healthy     Head:    Normocephalic without obvious abnormality   Eyes:    PERRL, conjunctiva/corneas clear, EOM's intact        Neck:   Supple, no adenopathy, no JVD   Back:     Symmetric, no spinal or CVA tenderness   Lungs:     Clear to auscultation bilaterally, no wheezing or rhonchi   Heart:    Regular rate and rhythm, S1 and S2 normal, no murmur   Abdomen:     Soft, NTND, +BS   Extremities:   Extremities normal  No clubbing, cyanosis or edema   Psych:   Normal Affect   Neurologic:   CNII-XII intact  Strength symmetric, speech intact                 Tanya Grove PA-C  Date: 9/3/2019 Time: 2:51 PM       This report has been generated by a voice recognition software system  Therefore, there may be syntax, spelling, and/or grammatical errors  Please call if you've any questions  This  encounter has been billed by a non certified Coder            Informed consent for procedure was personally discussed, reviewed, and signed by Dr Chel Abbott  Discussion by Dr Chel Abbott was carried out regarding risks, benefits, and alternatives with the patient  Risks include but are not limited to:  bleeding, infection, and delayed wound healing or an open wound, pulmonary embolus, leaks from bowel or bile ducts or other viscus, transfusions, death  Discussed in further detail the more common complications and their rates of occurrence   was used if necessary  Patient expressed understanding of the issues discussed and wished/consented to proceed  All questions were answered by Dr Chel Abbott  Discussion performed between patient and the provider signing below  Signature:   Rick Ayala  Date: 9/3/2019 Time: 2:51 PM                                                                                                                                        Informed consent for procedure was personally discussed, reviewed, and signed by Dr Chel Abbott  Discussion by Dr Chel Abbott was carried out regarding risks, benefits, and alternatives with the patient  Risks include but are not limited to:  bleeding, infection, and delayed wound healing or an open wound, pulmonary embolus, leaks from bowel or bile ducts or other viscus, transfusions, death  Discussed in further detail the more common complications and their rates of occurrence   was used if necessary  Patient expressed understanding of the issues discussed and wished/consented to proceed  All questions were answered by Dr Chel Abbott  Discussion performed between patient and the provider signing below  Signature:   Rick Ayala    Date: 9/3/2019 Time: 2:51 PM           Some portions of this record may have been generated with voice recognition software  There may be translation, syntax,  or grammatical errors  Occasional wrong word or "sound-a-like" substitutions may have occurred due to the inherent limitations of the voice recognition software   Read the chart carefully and recognize, using context, where substitutions may have occurred  If you have any questions, please contact the dictating provider for clarification or correction, as needed  This encounter has been coded by a non-certified coder

## 2019-09-11 ENCOUNTER — HOSPITAL ENCOUNTER (OUTPATIENT)
Dept: ULTRASOUND IMAGING | Facility: HOSPITAL | Age: 74
Discharge: HOME | End: 2019-09-11
Attending: INTERNAL MEDICINE
Payer: COMMERCIAL

## 2019-09-11 DIAGNOSIS — I10 ESSENTIAL HYPERTENSION: ICD-10-CM

## 2019-09-11 DIAGNOSIS — N18.2 CKD (CHRONIC KIDNEY DISEASE) STAGE 2, GFR 60-89 ML/MIN: ICD-10-CM

## 2019-09-11 PROCEDURE — 76770 US EXAM ABDO BACK WALL COMP: CPT

## 2019-09-16 ENCOUNTER — TELEPHONE (OUTPATIENT)
Dept: NEPHROLOGY | Facility: CLINIC | Age: 74
End: 2019-09-16

## 2019-09-16 NOTE — TELEPHONE ENCOUNTER
----- Message from Joyceann Najjar, MD sent at 9/16/2019 11:52 AM EDT -----  Please let him know most recent ultrasound kidneys are stable no hydronephrosis  Will discuss further at his upcoming visit if any questions or concerns please let me know    Thanks

## 2019-09-16 NOTE — TELEPHONE ENCOUNTER
Left detailed message on pt's voicemail with results, asked him to give us a call if he has questions/concerns

## 2019-10-14 DIAGNOSIS — J44.9 CHRONIC OBSTRUCTIVE PULMONARY DISEASE, UNSPECIFIED COPD TYPE (HCC): ICD-10-CM

## 2019-10-15 ENCOUNTER — TELEPHONE (OUTPATIENT)
Dept: SURGERY | Facility: CLINIC | Age: 74
End: 2019-10-15

## 2019-10-15 NOTE — TELEPHONE ENCOUNTER
Patient came in today for H&P update for colono scheduled for 10/29/19  He stated that he needed to cancel colonoscopy due to he had no transportation for date of procedure  He states that he really doesn't have anyone who can help him  Patient is aware that we are unable to arrange transportation for him after procedure  Patient states that he will keep looking and took and business card so if/when he procured transportation, he could call office to r/s procedure  He was very concerned that I make his PCP aware of his situation - he didn't want them to think that he was being non-compliant  I told him that I would notify them

## 2019-11-12 ENCOUNTER — TELEPHONE (OUTPATIENT)
Dept: FAMILY MEDICINE CLINIC | Facility: CLINIC | Age: 74
End: 2019-11-12

## 2019-11-12 DIAGNOSIS — K02.9 DENTAL CARIES: ICD-10-CM

## 2019-12-18 ENCOUNTER — OFFICE VISIT (OUTPATIENT)
Dept: FAMILY MEDICINE CLINIC | Facility: CLINIC | Age: 74
End: 2019-12-18

## 2019-12-18 VITALS
OXYGEN SATURATION: 95 % | TEMPERATURE: 97.3 F | WEIGHT: 180.4 LBS | SYSTOLIC BLOOD PRESSURE: 140 MMHG | DIASTOLIC BLOOD PRESSURE: 60 MMHG | HEART RATE: 100 BPM | BODY MASS INDEX: 26.72 KG/M2 | RESPIRATION RATE: 16 BRPM | HEIGHT: 69 IN

## 2019-12-18 DIAGNOSIS — I10 ESSENTIAL HYPERTENSION: Primary | ICD-10-CM

## 2019-12-18 DIAGNOSIS — Z23 NEED FOR VACCINATION: ICD-10-CM

## 2019-12-18 PROCEDURE — 99213 OFFICE O/P EST LOW 20 MIN: CPT | Performed by: FAMILY MEDICINE

## 2019-12-18 PROCEDURE — 3008F BODY MASS INDEX DOCD: CPT | Performed by: FAMILY MEDICINE

## 2019-12-18 PROCEDURE — 3725F SCREEN DEPRESSION PERFORMED: CPT | Performed by: FAMILY MEDICINE

## 2019-12-18 PROCEDURE — 1160F RVW MEDS BY RX/DR IN RCRD: CPT | Performed by: FAMILY MEDICINE

## 2019-12-18 PROCEDURE — 90732 PPSV23 VACC 2 YRS+ SUBQ/IM: CPT | Performed by: FAMILY MEDICINE

## 2019-12-18 PROCEDURE — 1036F TOBACCO NON-USER: CPT | Performed by: FAMILY MEDICINE

## 2019-12-18 PROCEDURE — 90662 IIV NO PRSV INCREASED AG IM: CPT | Performed by: FAMILY MEDICINE

## 2019-12-18 PROCEDURE — G0008 ADMIN INFLUENZA VIRUS VAC: HCPCS | Performed by: FAMILY MEDICINE

## 2019-12-18 PROCEDURE — 1101F PT FALLS ASSESS-DOCD LE1/YR: CPT | Performed by: FAMILY MEDICINE

## 2019-12-18 PROCEDURE — G0009 ADMIN PNEUMOCOCCAL VACCINE: HCPCS | Performed by: FAMILY MEDICINE

## 2019-12-18 PROCEDURE — 4040F PNEUMOC VAC/ADMIN/RCVD: CPT | Performed by: FAMILY MEDICINE

## 2019-12-18 NOTE — PROGRESS NOTES
Chief Complaint   Patient presents with    Hypertension     f/u     /60 (BP Location: Right arm, Patient Position: Sitting, Cuff Size: Adult)   Pulse 100   Temp (!) 97 3 °F (36 3 °C) (Tympanic)   Resp 16   Ht 5' 9" (1 753 m)   Wt 81 8 kg (180 lb 6 4 oz)   SpO2 95%   BMI 26 64 kg/m²      Assessment/Plan     Hypertension  -BP is 140/60  -Goal BP <150/<90 due to age >57 without comorbid conditions   -Controlled - will continue with current treatment plan  -Discussed DASH diet and exercise  -BP follow-up recommended in 6 months  -Labs: Reviewed  Diagnoses and all orders for this visit:    Essential hypertension    Need for vaccination  -     PNEUMOCOCCAL POLYSACCHARIDE VACCINE 23-VALENT =>3YO SQ IM  -     influenza vaccine, 9599-6765, high-dose, PF 0 5 mL (Gayathri Young)       Frank Monreal  is a 69 y o  male  Information was obtained from chart review and the patient  The language used was [place]  Presents for follow-up  Reports compliance with both antihypertensives and both COPD pumps  Slightly short of breath per usual due to walking over to the office but feels okay and declined a breathing treatment in the office  Walking over 1 mile a day, as tracked by his phone  Had colonoscopy scheduled but had to cancel due to not having a ride after the procedure  Review of Systems   Constitutional: Negative for activity change, appetite change, chills and fever  Respiratory: Negative for shortness of breath  Cardiovascular: Negative for chest pain, palpitations and leg swelling  Gastrointestinal: Negative for blood in stool, nausea and vomiting  Genitourinary: Negative for difficulty urinating and dysuria  Neurological: Negative for dizziness, syncope, weakness and light-headedness  Psychiatric/Behavioral: Negative for behavioral problems  Pertinent items are noted in HPI  Social History  - reports that he has quit smoking   His smoking use included cigarettes  He started smoking about a year ago  He has never used smokeless tobacco   - reports that he drinks alcohol    - reports that he does not use drugs  Objective     Physical Exam   Constitutional: He is oriented to person, place, and time  He appears well-developed and well-nourished  No distress  HENT:   Head: Normocephalic and atraumatic  Eyes: Conjunctivae are normal    Neck: Normal range of motion  Cardiovascular: Normal rate and regular rhythm  Murmur heard  Systolic murmur is present with a grade of 2/6  Pulmonary/Chest: Effort normal and breath sounds normal  No respiratory distress  He has no wheezes  Musculoskeletal: Normal range of motion  He exhibits no edema  Neurological: He is alert and oriented to person, place, and time  Psychiatric: He has a normal mood and affect  His behavior is normal    Nursing note and vitals reviewed         Health Information     The following have been reviewed and updated: current medications    No Known Allergies    Current Outpatient Medications:     albuterol (VENTOLIN HFA) 90 mcg/act inhaler, Inhale 2 puffs every 6 (six) hours as needed for wheezing or shortness of breath (15 minutes before exercise), Disp: 18 g, Rfl: 2    amLODIPine (NORVASC) 5 mg tablet, Take 1 tablet (5 mg total) by mouth daily, Disp: 90 tablet, Rfl: 3    lisinopril (ZESTRIL) 20 mg tablet, Take 1 tablet (20 mg total) by mouth every evening, Disp: 90 tablet, Rfl: 3    tiotropium (SPIRIVA RESPIMAT) 1 25 MCG/ACT AERS inhaler, Inhale 2 puffs daily, Disp: 3 Inhaler, Rfl: 3    Patient Active Problem List   Diagnosis    Presbycusis    Osteopenia    Essential hypertension    COPD (chronic obstructive pulmonary disease) (HCC)    Tachycardia    Screening for colon cancer    Hyperlipidemia    Elevated serum creatinine    CKD (chronic kidney disease) stage 2, GFR 60-89 ml/min     Past Surgical History:   Procedure Laterality Date    APPENDECTOMY      HERNIA REPAIR  STRABISMUS SURGERY       Family History   Problem Relation Age of Onset    No Known Problems Mother     No Known Problems Father      Health Maintenance   Topic Date Due    Hepatitis C Screening  1945   Venkat Aiken Medicare Annual Wellness Visit (AWV)  1945    CRC Screening: Colonoscopy  1945    BMI: Followup Plan  11/16/1964    Fall Risk  12/18/2020    Depression Screening PHQ  12/18/2020    BMI: Adult  12/18/2020    DTaP,Tdap,and Td Vaccines (3 - Td) 12/21/2026    Influenza Vaccine  Completed    Pneumococcal Vaccine: 65+ Years  Completed    Pneumococcal Vaccine: Pediatrics (0 to 5 Years) and At-Risk Patients (6 to 59 Years)  Aged Out    HIB Vaccine  Aged Out    Hepatitis B Vaccine  Aged Out    IPV Vaccine  Aged Out    Hepatitis A Vaccine  Aged Out    Meningococcal ACWY Vaccine  Aged Out    HPV Vaccine  Aged Out

## 2020-03-19 ENCOUNTER — TELEPHONE (OUTPATIENT)
Dept: NEPHROLOGY | Facility: CLINIC | Age: 75
End: 2020-03-19

## 2020-04-10 ENCOUNTER — TELEMEDICINE (OUTPATIENT)
Dept: FAMILY MEDICINE CLINIC | Facility: CLINIC | Age: 75
End: 2020-04-10

## 2020-04-10 DIAGNOSIS — J44.9 CHRONIC OBSTRUCTIVE PULMONARY DISEASE, UNSPECIFIED COPD TYPE (HCC): ICD-10-CM

## 2020-04-10 DIAGNOSIS — I10 ESSENTIAL HYPERTENSION: Primary | ICD-10-CM

## 2020-04-10 PROCEDURE — G2012 BRIEF CHECK IN BY MD/QHP: HCPCS | Performed by: FAMILY MEDICINE

## 2020-05-22 ENCOUNTER — APPOINTMENT (OUTPATIENT)
Dept: LAB | Facility: HOSPITAL | Age: 75
End: 2020-05-22
Attending: INTERNAL MEDICINE
Payer: COMMERCIAL

## 2020-05-22 DIAGNOSIS — N18.2 CKD (CHRONIC KIDNEY DISEASE) STAGE 2, GFR 60-89 ML/MIN: ICD-10-CM

## 2020-05-22 LAB
ALBUMIN SERPL BCP-MCNC: 4.3 G/DL (ref 3–5.2)
ANION GAP SERPL CALCULATED.3IONS-SCNC: 9 MMOL/L (ref 5–14)
BUN SERPL-MCNC: 29 MG/DL (ref 5–25)
CALCIUM SERPL-MCNC: 9.4 MG/DL (ref 8.4–10.2)
CHLORIDE SERPL-SCNC: 104 MMOL/L (ref 97–108)
CO2 SERPL-SCNC: 24 MMOL/L (ref 22–30)
CREAT SERPL-MCNC: 1.53 MG/DL (ref 0.7–1.5)
GFR SERPL CREATININE-BSD FRML MDRD: 44 ML/MIN/1.73SQ M
GLUCOSE P FAST SERPL-MCNC: 112 MG/DL (ref 70–99)
PHOSPHATE SERPL-MCNC: 2.2 MG/DL (ref 2.5–4.8)
POTASSIUM SERPL-SCNC: 4.8 MMOL/L (ref 3.6–5)
SODIUM SERPL-SCNC: 137 MMOL/L (ref 137–147)

## 2020-05-22 PROCEDURE — 80069 RENAL FUNCTION PANEL: CPT

## 2020-05-22 PROCEDURE — 36415 COLL VENOUS BLD VENIPUNCTURE: CPT

## 2020-05-27 ENCOUNTER — TELEMEDICINE (OUTPATIENT)
Dept: NEPHROLOGY | Facility: CLINIC | Age: 75
End: 2020-05-27
Payer: COMMERCIAL

## 2020-05-27 DIAGNOSIS — N28.1 CYST OF KIDNEY, ACQUIRED: ICD-10-CM

## 2020-05-27 DIAGNOSIS — N18.2 CKD (CHRONIC KIDNEY DISEASE) STAGE 2, GFR 60-89 ML/MIN: Primary | ICD-10-CM

## 2020-05-27 DIAGNOSIS — E78.5 HYPERLIPIDEMIA, UNSPECIFIED HYPERLIPIDEMIA TYPE: ICD-10-CM

## 2020-05-27 DIAGNOSIS — I10 ESSENTIAL HYPERTENSION: ICD-10-CM

## 2020-05-27 PROBLEM — R79.89 ELEVATED SERUM CREATININE: Status: RESOLVED | Noted: 2019-05-07 | Resolved: 2020-05-27

## 2020-05-27 PROCEDURE — 1160F RVW MEDS BY RX/DR IN RCRD: CPT | Performed by: INTERNAL MEDICINE

## 2020-05-27 PROCEDURE — 99214 OFFICE O/P EST MOD 30 MIN: CPT | Performed by: INTERNAL MEDICINE

## 2020-05-28 ENCOUNTER — TELEPHONE (OUTPATIENT)
Dept: NEPHROLOGY | Facility: CLINIC | Age: 75
End: 2020-05-28

## 2020-06-17 ENCOUNTER — OFFICE VISIT (OUTPATIENT)
Dept: FAMILY MEDICINE CLINIC | Facility: CLINIC | Age: 75
End: 2020-06-17

## 2020-06-17 VITALS
OXYGEN SATURATION: 93 % | TEMPERATURE: 98.8 F | HEIGHT: 69 IN | WEIGHT: 189 LBS | BODY MASS INDEX: 27.99 KG/M2 | HEART RATE: 135 BPM | DIASTOLIC BLOOD PRESSURE: 60 MMHG | RESPIRATION RATE: 20 BRPM | SYSTOLIC BLOOD PRESSURE: 110 MMHG

## 2020-06-17 DIAGNOSIS — N18.2 CKD (CHRONIC KIDNEY DISEASE) STAGE 2, GFR 60-89 ML/MIN: ICD-10-CM

## 2020-06-17 DIAGNOSIS — R00.0 TACHYCARDIA: ICD-10-CM

## 2020-06-17 DIAGNOSIS — J45.990 EXERCISE-INDUCED ASTHMA: ICD-10-CM

## 2020-06-17 DIAGNOSIS — I10 ESSENTIAL HYPERTENSION: Primary | ICD-10-CM

## 2020-06-17 DIAGNOSIS — J44.9 CHRONIC OBSTRUCTIVE PULMONARY DISEASE, UNSPECIFIED COPD TYPE (HCC): ICD-10-CM

## 2020-06-17 PROCEDURE — 4040F PNEUMOC VAC/ADMIN/RCVD: CPT | Performed by: FAMILY MEDICINE

## 2020-06-17 PROCEDURE — 1036F TOBACCO NON-USER: CPT | Performed by: FAMILY MEDICINE

## 2020-06-17 PROCEDURE — 3008F BODY MASS INDEX DOCD: CPT | Performed by: FAMILY MEDICINE

## 2020-06-17 PROCEDURE — 99213 OFFICE O/P EST LOW 20 MIN: CPT | Performed by: FAMILY MEDICINE

## 2020-06-17 PROCEDURE — 1160F RVW MEDS BY RX/DR IN RCRD: CPT | Performed by: FAMILY MEDICINE

## 2020-06-17 PROCEDURE — 3008F BODY MASS INDEX DOCD: CPT | Performed by: INTERNAL MEDICINE

## 2020-06-17 PROCEDURE — 3078F DIAST BP <80 MM HG: CPT | Performed by: FAMILY MEDICINE

## 2020-06-17 PROCEDURE — 3074F SYST BP LT 130 MM HG: CPT | Performed by: FAMILY MEDICINE

## 2020-06-17 RX ORDER — AMLODIPINE BESYLATE 5 MG/1
5 TABLET ORAL DAILY
Qty: 90 TABLET | Refills: 3 | Status: SHIPPED | OUTPATIENT
Start: 2020-06-17 | End: 2021-07-12 | Stop reason: SDUPTHER

## 2020-06-17 RX ORDER — LISINOPRIL 20 MG/1
20 TABLET ORAL EVERY EVENING
Qty: 90 TABLET | Refills: 3 | Status: SHIPPED | OUTPATIENT
Start: 2020-06-17 | End: 2020-09-02

## 2020-06-17 RX ORDER — ALBUTEROL SULFATE 90 UG/1
2 AEROSOL, METERED RESPIRATORY (INHALATION) EVERY 6 HOURS PRN
Qty: 18 G | Refills: 5 | Status: SHIPPED | OUTPATIENT
Start: 2020-06-17 | End: 2021-06-28

## 2020-06-18 ENCOUNTER — TELEPHONE (OUTPATIENT)
Dept: FAMILY MEDICINE CLINIC | Facility: CLINIC | Age: 75
End: 2020-06-18

## 2020-07-01 ENCOUNTER — TELEPHONE (OUTPATIENT)
Dept: NEPHROLOGY | Facility: CLINIC | Age: 75
End: 2020-07-01

## 2020-07-27 ENCOUNTER — DOCTOR'S OFFICE (OUTPATIENT)
Dept: URBAN - METROPOLITAN AREA CLINIC 136 | Facility: CLINIC | Age: 75
Setting detail: OPHTHALMOLOGY
End: 2020-07-27
Payer: MEDICARE

## 2020-07-27 DIAGNOSIS — F17.200: ICD-10-CM

## 2020-07-27 DIAGNOSIS — H35.371: ICD-10-CM

## 2020-07-27 DIAGNOSIS — Z96.1: ICD-10-CM

## 2020-07-27 DIAGNOSIS — H26.40: ICD-10-CM

## 2020-07-27 DIAGNOSIS — H35.3131: ICD-10-CM

## 2020-07-27 PROCEDURE — 92014 COMPRE OPH EXAM EST PT 1/>: CPT | Performed by: OPHTHALMOLOGY

## 2020-07-27 PROCEDURE — 92134 CPTRZ OPH DX IMG PST SGM RTA: CPT | Performed by: OPHTHALMOLOGY

## 2020-07-27 ASSESSMENT — AXIALLENGTH_DERIVED
OS_AL: 22.7248
OD_AL: 23.4329
OS_AL: 22.77
OD_AL: 23.9235

## 2020-07-27 ASSESSMENT — REFRACTION_MANIFEST
OD_VA1: 20/40
OD_ADD: +3.00
OU_VA: 20/40
OD_CYLINDER: -2.00
OS_CYLINDER: -1.75
OD_SPHERE: +0.75
OS_SPHERE: +2.00
OS_AXIS: 010
OS_VA1: 20/50
OD_VA2: 20/25
OS_ADD: +3.00
OD_AXIS: 180
OS_VA2: 20/25

## 2020-07-27 ASSESSMENT — KERATOMETRY
OD_AXISANGLE_DEGREES: 025
OD_K2POWER_DIOPTERS: 44.50
OS_K1POWER_DIOPTERS: 43.50
OS_K2POWER_DIOPTERS: 45.75
OD_K1POWER_DIOPTERS: 41.25
OS_AXISANGLE_DEGREES: 08

## 2020-07-27 ASSESSMENT — VISUAL ACUITY
OS_BCVA: 20/50-1
OD_BCVA: 20/50+2

## 2020-07-27 ASSESSMENT — REFRACTION_AUTOREFRACTION
OS_CYLINDER: -2.50
OS_AXIS: 011
OD_AXIS: 007
OD_SPHERE: +1.75
OS_SPHERE: +2.50
OD_CYLINDER: -1.50

## 2020-07-27 ASSESSMENT — REFRACTION_CURRENTRX
OD_OVR_VA: 20/
OS_OVR_VA: 20/

## 2020-07-27 ASSESSMENT — SPHEQUIV_DERIVED
OD_SPHEQUIV: -0.25
OD_SPHEQUIV: 1
OS_SPHEQUIV: 1.25
OS_SPHEQUIV: 1.125

## 2020-07-27 ASSESSMENT — CONFRONTATIONAL VISUAL FIELD TEST (CVF)
OD_FINDINGS: FULL
OS_FINDINGS: FULL

## 2020-08-12 ENCOUNTER — HOSPITAL ENCOUNTER (OUTPATIENT)
Dept: ULTRASOUND IMAGING | Facility: HOSPITAL | Age: 75
Discharge: HOME | End: 2020-08-12
Attending: INTERNAL MEDICINE
Payer: COMMERCIAL

## 2020-08-12 ENCOUNTER — APPOINTMENT (OUTPATIENT)
Dept: LAB | Facility: HOSPITAL | Age: 75
End: 2020-08-12
Attending: INTERNAL MEDICINE
Payer: COMMERCIAL

## 2020-08-12 DIAGNOSIS — N18.2 CKD (CHRONIC KIDNEY DISEASE) STAGE 2, GFR 60-89 ML/MIN: ICD-10-CM

## 2020-08-12 DIAGNOSIS — I10 ESSENTIAL HYPERTENSION: ICD-10-CM

## 2020-08-12 DIAGNOSIS — R00.0 TACHYCARDIA: ICD-10-CM

## 2020-08-12 LAB
25(OH)D3 SERPL-MCNC: 19.4 NG/ML (ref 30–100)
ALBUMIN SERPL BCP-MCNC: 4.5 G/DL (ref 3–5.2)
ANION GAP SERPL CALCULATED.3IONS-SCNC: 14 MMOL/L (ref 5–14)
BASOPHILS # BLD AUTO: 0.1 THOUSANDS/ΜL (ref 0–0.1)
BASOPHILS NFR BLD AUTO: 1 % (ref 0–1)
BUN SERPL-MCNC: 22 MG/DL (ref 5–25)
CALCIUM SERPL-MCNC: 9.6 MG/DL (ref 8.4–10.2)
CHLORIDE SERPL-SCNC: 97 MMOL/L (ref 97–108)
CO2 SERPL-SCNC: 23 MMOL/L (ref 22–30)
CREAT SERPL-MCNC: 1.32 MG/DL (ref 0.7–1.5)
CREAT UR-MCNC: 31.9 MG/DL
EOSINOPHIL # BLD AUTO: 0.1 THOUSAND/ΜL (ref 0–0.4)
EOSINOPHIL NFR BLD AUTO: 1 % (ref 0–6)
ERYTHROCYTE [DISTWIDTH] IN BLOOD BY AUTOMATED COUNT: 13 %
GFR SERPL CREATININE-BSD FRML MDRD: 53 ML/MIN/1.73SQ M
GLUCOSE P FAST SERPL-MCNC: 176 MG/DL (ref 70–99)
HCT VFR BLD AUTO: 44.9 % (ref 41–53)
HGB BLD-MCNC: 15.4 G/DL (ref 13.5–17.5)
LYMPHOCYTES # BLD AUTO: 1.4 THOUSANDS/ΜL (ref 0.5–4)
LYMPHOCYTES NFR BLD AUTO: 22 % (ref 25–45)
MAGNESIUM SERPL-MCNC: 1.9 MG/DL (ref 1.6–2.3)
MCH RBC QN AUTO: 31.2 PG (ref 26–34)
MCHC RBC AUTO-ENTMCNC: 34.4 G/DL (ref 31–36)
MCV RBC AUTO: 91 FL (ref 80–100)
MONOCYTES # BLD AUTO: 0.6 THOUSAND/ΜL (ref 0.2–0.9)
MONOCYTES NFR BLD AUTO: 10 % (ref 1–10)
NEUTROPHILS # BLD AUTO: 4.1 THOUSANDS/ΜL (ref 1.8–7.8)
NEUTS SEG NFR BLD AUTO: 66 % (ref 45–65)
PHOSPHATE SERPL-MCNC: 2 MG/DL (ref 2.5–4.8)
PLATELET # BLD AUTO: 290 THOUSANDS/UL (ref 150–450)
PMV BLD AUTO: 7.4 FL (ref 8.9–12.7)
POTASSIUM SERPL-SCNC: 4.3 MMOL/L (ref 3.6–5)
PROT UR-MCNC: <6 MG/DL
PROT/CREAT UR: <0.19 MG/G{CREAT} (ref 0–0.1)
PTH-INTACT SERPL-MCNC: 56.9 PG/ML (ref 16.7–78.9)
RBC # BLD AUTO: 4.94 MILLION/UL (ref 4.5–5.9)
SODIUM SERPL-SCNC: 134 MMOL/L (ref 137–147)
WBC # BLD AUTO: 6.2 THOUSAND/UL (ref 4.5–11)

## 2020-08-12 PROCEDURE — 84156 ASSAY OF PROTEIN URINE: CPT

## 2020-08-12 PROCEDURE — 76770 US EXAM ABDO BACK WALL COMP: CPT

## 2020-08-12 PROCEDURE — 80069 RENAL FUNCTION PANEL: CPT

## 2020-08-12 PROCEDURE — 36415 COLL VENOUS BLD VENIPUNCTURE: CPT

## 2020-08-12 PROCEDURE — 82570 ASSAY OF URINE CREATININE: CPT

## 2020-08-12 PROCEDURE — 83735 ASSAY OF MAGNESIUM: CPT

## 2020-08-12 PROCEDURE — 85025 COMPLETE CBC W/AUTO DIFF WBC: CPT

## 2020-08-12 PROCEDURE — 82306 VITAMIN D 25 HYDROXY: CPT

## 2020-08-12 PROCEDURE — 83970 ASSAY OF PARATHORMONE: CPT

## 2020-09-02 ENCOUNTER — OFFICE VISIT (OUTPATIENT)
Dept: NEPHROLOGY | Facility: CLINIC | Age: 75
End: 2020-09-02
Payer: COMMERCIAL

## 2020-09-02 VITALS
HEART RATE: 90 BPM | WEIGHT: 190 LBS | DIASTOLIC BLOOD PRESSURE: 90 MMHG | RESPIRATION RATE: 22 BRPM | TEMPERATURE: 98 F | HEIGHT: 69 IN | SYSTOLIC BLOOD PRESSURE: 166 MMHG | BODY MASS INDEX: 28.14 KG/M2

## 2020-09-02 DIAGNOSIS — E78.5 HYPERLIPIDEMIA, UNSPECIFIED HYPERLIPIDEMIA TYPE: ICD-10-CM

## 2020-09-02 DIAGNOSIS — N18.31 CKD STAGE G3A/A2, GFR 45-59 AND ALBUMIN CREATININE RATIO 30-299 MG/G (HCC): Primary | ICD-10-CM

## 2020-09-02 DIAGNOSIS — I10 ESSENTIAL HYPERTENSION: ICD-10-CM

## 2020-09-02 DIAGNOSIS — E55.9 VITAMIN D DEFICIENCY: ICD-10-CM

## 2020-09-02 DIAGNOSIS — N28.1 CYST OF KIDNEY, ACQUIRED: ICD-10-CM

## 2020-09-02 PROCEDURE — 1036F TOBACCO NON-USER: CPT | Performed by: INTERNAL MEDICINE

## 2020-09-02 PROCEDURE — 3080F DIAST BP >= 90 MM HG: CPT | Performed by: INTERNAL MEDICINE

## 2020-09-02 PROCEDURE — 99214 OFFICE O/P EST MOD 30 MIN: CPT | Performed by: INTERNAL MEDICINE

## 2020-09-02 PROCEDURE — 1160F RVW MEDS BY RX/DR IN RCRD: CPT | Performed by: INTERNAL MEDICINE

## 2020-09-02 RX ORDER — ERGOCALCIFEROL (VITAMIN D2) 1250 MCG
50000 CAPSULE ORAL WEEKLY
Qty: 12 CAPSULE | Refills: 0 | Status: SHIPPED | OUTPATIENT
Start: 2020-09-02

## 2020-09-02 RX ORDER — LISINOPRIL 40 MG/1
40 TABLET ORAL EVERY EVENING
Qty: 90 TABLET | Refills: 3 | Status: SHIPPED | OUTPATIENT
Start: 2020-09-02 | End: 2021-07-12 | Stop reason: SDUPTHER

## 2020-09-02 NOTE — PATIENT INSTRUCTIONS
- change lisinopril to 40mg once a day  - start vit D (ergocalciferol 96145 units) once a week for 12 weeks then over the counter vit D of 2000 units daily  - get blood work in 1 month     - Please call me in 10 days after having your blood work done to review the results if you do not hear back from me or my office, as I may have not received the results  - please remember to perform blood work prior to the next visit  - Please call if the blood pressure top number is greater than 150 or less than 110 consistently  - Please call if you are gaining more than 2lbs in 2 days for adjustment of water pills   ~ Please AVOID the following pain medications  LIST OF NSAIDS (NONSTEROIDAL ANTI-INFLAMMATORY DRUGS) AND CHRISTIANSON-2 INHIBITORS    DIFLUNISAL (DOLOBID)  IBUPROFEN (MOTRIN, ADVIL)  FLURBIPROFEN (ANSAID)  KETOPROFEN (ORUDIS, ORUVAIL)  FENOPROFEN (NALFON)  NABUMETONE (RELAFEN)  PIROXICAM (FELDENE)  NAPROXEN (ALEVE, NAPROSYN, NAPRELAN, ANAPROX)  DICLOFENAC (VOLTAREN, CATAFLAM)  INDOMETHACIN (INDOCIN)  SULINDAC (CLINORIL)  TOLMETIN (TOLETIN)  ETODOLAC (LODINE)  MELOXICAM (MOBIC)  KETOROLAC (TORADOL)  OXAPROZIN (DAYPRO)  CELECOXIB (CELEBREX)    Phosphorus diet  Follow a moderate phosphorus diet      Avoid these higher phosphorus foods: Choose these lower phosphorus foods:   Milk, pudding or yogurt (from animals and from many soy varieties) Rice milk (unfortified), nondairy creamer (if it doesn't have terms in the ingredients list that contain the letters "phos")   Hard cheeses, ricotta or cottage cheese, fat-free cream cheese Regular and low-fat cream cheese   Ice cream or frozen yogurt Sherbet or frozen fruit pops   Soups made with higher phosphorus ingredients (milk, dried peas, beans, lentils) Soups made with lower phosphorus ingredients (broth- or water-based with other lower phosphorus ingredients)   Whole grains, including whole-grain breads, crackers, cereal, rice and pasta Refined grains, including white bread, crackers, cereals, rice and pasta   Quick breads, biscuits, cornbread, muffins, pancakes or waffles Homemade refined (white) dinner rolls, bagels or English muffins   Dried peas (split, black-eyed), beans (black, garbanzo, lima, kidney, navy, garcia) or lentils Green peas (canned, frozen), green beans or wax beans   Organ meats, walleye, pollock or sardines Lean beef, pork, lamb, poultry or other fish   Nuts and seeds Popcorn   Peanut butter and other nut butters Jam, jelly or honey   Chocolate, including chocolate drinks Carob (chocolate-flavored) candy, hard candy or gumdrops   Radha and pepper-type sodas, flavored mascorro, bottled teas (if a term in the ingredients list contains the letters "phos") Lemon-lime soda, ginger ale or root beer, plain water     Follow a moderate potassium diet  Things to do to reduce your blood pressure include working with all your physician to do the following:  ~ stop smoking if you smoke  ~ increase cardiovascular exercise like walking and swimming    ~ modify your diet to decrease fat and salt intake  ~ reduce your weight if you are overweight or obese   ~ increase the consumption of fruits, vegetables and whole grains  ~ decrease alcohol consumption if you consume alcohol    ~ try to minimize stress in your life with lifestyle modifications  ~ be compliant with your anti-hypertensive medications  ~ adjust your medications to help improve your vascular stiffness and decrease risks for heart attacks and strokes

## 2020-09-02 NOTE — PROGRESS NOTES
Nephrology Follow up Consultation  Gaston Barrow  69 y o  male MRN: [id number]            BACKGROUND:  Gaston Barrow  is a 69 y o male who was referred by Tammi Mcleod MD for evaluation of Follow-up and Chronic Kidney Disease    ASSESSMENT / PLAN:   69 y o   male with pmh of multiple co-morbidities including  hypertension  (x 5yrs), COPD, hyperlipidemia and CKD stage 2/3 presents to the office for routine follow-up       1  CKD stage 2/3AA2:  - Patient has a baseline creatinine of 1 2-1 4 mg/dL  Most recent labs show a Creatinine of 1 32  mg/dL on 08/12/2020  Renal function remains stable  - likely has underlying CKD secondary to age-related nephron loss plus hypertensive nephrosclerosis  - renal ultrasound from 09/11/2019 showed right kidney 10 cm, 2 x 2 cm cyst in the right kidney and a septated cyst in the left kidney  Left kidney size not documented, no hydronephrosis  -renal ultrasound from 08/12/2020 showed bilateral medical renal disease, bilateral renal cysts, left kidney cyst partially septated may repeat ultrasound in 1 year  - Proteinuria -  protein creatinine ratio of 190 mg as of August 2020  - Acid base and lytes stable  except hypophosphatemia, advised patient of dietary habits  - Recommend to avoid use of NSAIDs, nephrotoxins  Caution advised with regards to exposure to IV contrast dye    - Discussed with the patient in depth his renal status, including the possible etiologies for CKD  - Advised the patient that when his GFR is close to 20mL/min then will start discussing about RRT(renal replacement therapy) options such as renal transplant, peritoneal dialysis and hemodialysis  - Informed the patient about the various options for Renal Replacement therapy    - Discussed with the patient how we need to work together to delay the progression of CKD with optimal BP control based on their age and co-morbidities and trying to reduce proteinuria by the use of anti-proteinuric agents       2  Hypertension:  - Patient is on lisinopril 20 mg po QHS, norvasc 5mg po q24    - increase lisinopril to 40 mg p o  Q day check blood work in 1 month   - Goal BP of <  140/90 based on age and comorbidities  - Instructed to follow low sodium (2gm)diet   - Advised to hold ACEI/ARBs if patient suffers from dehydration due to gastrointestinal losses due to risk of LILLY secondary to failure to autoregulate      3  Hemoglobin:  - Goal Hb of 10-12 g/dL  - Most recent labs suggestive of 15 4 g per dL  - no role for IV iron at this time     4  CKD-MBD(Mineral Bone Disease)/vitamin-D deficiency:  - Based on patients CKD stage following is the goal of therapy  - Maintain calcium phosphorus product of < 55   - Stage 3 CKD - Goal Ca 8 5-10 mg/dL , goal Phos 2 7-4 6 mg/dL  , goal iPTH 30-70 pg/mL  - Patient is currently at goal   - start on vitamin-D 95041 units p o  Q weekly for 12 weeks then over-the-counter vitamin-D 2000 units p o  Q day   - most recent vitamin-D level of 19 4 and intact PTH of 56 9 as of August 2020  - check intact PTH and vitamin-D     5  Lipids:  - goal LDL less than 70  - Management as per PCP     6  Hearing loss:  - management as per Primary team     7  COPD:  - Management as per primary team  - on inhalers     8  Nutrition:  - Encouraged patient to follow a renal diet comprising of moderate potassium, low phosphorus and protein restriction to 0 8gm/kg  - Will check serum albumin with next blood work       9  Followup:  - Patient is to follow-up in 4 months, with lab work to be performed in 1 month and then again in a few days prior to the visit  Advised patient to call me in 10 days to review the results if they do not hear back from me, as I may have not received the results  Joanna Mercedes MD, FASN, 9/2/2020, 12:21 PM             SUBJECTIVE: 69 y o  male presents to the office for routine follow-up    Hard of hearing overall feels well has no complaints no recent hospitalizations not checking his blood pressures at home thankful for all the care he is getting  No issues with edema no NSAID use recently had his inhaler changed  Agreeable to increasing his lisinopril dose  Will watch out his potassium food intake  Usually consumes a lot of potassium containing foods  Review of Systems   Constitutional: Negative for appetite change, chills, fatigue and fever  HENT: Positive for hearing loss  Negative for congestion  Respiratory: Negative for cough, shortness of breath and wheezing  Cardiovascular: Negative for leg swelling  Gastrointestinal: Negative for abdominal pain, constipation, diarrhea, nausea and vomiting  Genitourinary: Negative for difficulty urinating, dysuria and hematuria  Musculoskeletal: Negative for back pain  Skin: Negative for rash and wound  Neurological: Negative for dizziness and headaches  Psychiatric/Behavioral: Negative for agitation and confusion  All other systems reviewed and are negative  PAST MEDICAL HISTORY:  Past Medical History:   Diagnosis Date    CKD (chronic kidney disease)     Hypertension     Microalbuminuria        PROBLEM LIST    Patient Active Problem List   Diagnosis    Presbycusis    Osteopenia    Essential hypertension    COPD (chronic obstructive pulmonary disease) ([facility] [address] )    Tachycardia    Screening for colon cancer    Hyperlipidemia    CKD (chronic kidney disease) stage 2, GFR 60-89 ml/min    Cyst of kidney, acquired    Vitamin D deficiency       PAST SURGICAL HISTORY:  Past Surgical History:   Procedure Laterality Date    APPENDECTOMY      HERNIA REPAIR      STRABISMUS SURGERY         SOCIAL HISTORY :   reports that he has quit smoking  His smoking use included cigarettes  He started smoking about 20 months ago  He has never used smokeless tobacco  He reports current alcohol use  He reports that he does not use drugs      FAMILY HISTORY:  Family History   Problem Relation Age of Onset    No Known Problems Mother     No Known Problems Father        ALLERGIES:  No Known Allergies        PHYSICAL EXAM:  Vitals:    09/02/20 1141   BP: 166/90   BP Location: Left arm   Patient Position: Sitting   Cuff Size: Standard   Pulse: 90   Resp: 22   Temp: 98 °F (36 7 °C)   TempSrc: Oral   Weight: 86 2 kg (190 lb)   Height: 5' 9" (1 753 m)     Body mass index is 28 06 kg/m²  Physical Exam  Vitals signs reviewed  Constitutional:       General: He is not in acute distress  Appearance: He is not ill-appearing, toxic-appearing or diaphoretic  HENT:      Head: Normocephalic and atraumatic  Mouth/Throat:      Mouth: Mucous membranes are moist       Pharynx: Oropharynx is clear  No oropharyngeal exudate or posterior oropharyngeal erythema  Eyes:      General: No scleral icterus  Conjunctiva/sclera: Conjunctivae normal    Neck:      Musculoskeletal: Normal range of motion and neck supple  Cardiovascular:      Rate and Rhythm: Normal rate  Heart sounds: Normal heart sounds  No friction rub  Pulmonary:      Effort: Pulmonary effort is normal  No respiratory distress  Breath sounds: Normal breath sounds  No wheezing or rales  Abdominal:      General: Bowel sounds are normal  There is no distension  Palpations: Abdomen is soft  There is no mass  Tenderness: There is no abdominal tenderness  Musculoskeletal:         General: No swelling  Skin:     General: Skin is warm  Coloration: Skin is not jaundiced or pale  Neurological:      General: No focal deficit present  Mental Status: He is alert and oriented to person, place, and time     Psychiatric:         Mood and Affect: Mood normal          Behavior: Behavior normal          LABORATORY DATA:     Results from last 6 Months   Lab Units 08/12/20  0904 05/22/20  0850   WBC Thousand/uL 6 20  --    HEMOGLOBIN g/dL 15 4  --    HEMATOCRIT % 44 9  --    PLATELETS Thousands/uL 290  --    POTASSIUM mmol/L 4 3 4 8 CHLORIDE mmol/L 97 104   CO2 mmol/L 23 24   BUN mg/dL 22 29*   CREATININE mg/dL 1 32 1 53*   CALCIUM mg/dL 9 6 9 4   MAGNESIUM mg/dL 1 9  --    PHOSPHORUS mg/dL 2 0* 2 2*        rest all reviewed    RADIOLOGY:  No orders to display     Rest all reviewed        MEDICATIONS:    Current Outpatient Medications:     albuterol (Ventolin HFA) 90 mcg/act inhaler, Inhale 2 puffs every 6 (six) hours as needed for wheezing or shortness of breath (15 minutes before exercise), Disp: 18 g, Rfl: 5    amLODIPine (NORVASC) 5 mg tablet, Take 1 tablet (5 mg total) by mouth daily, Disp: 90 tablet, Rfl: 3    lisinopril (ZESTRIL) 40 mg tablet, Take 1 tablet (40 mg total) by mouth every evening, Disp: 90 tablet, Rfl: 3    ergocalciferol (ERGOCALCIFEROL) 1 25 MG (06283 UT) capsule, Take 1 capsule (50,000 Units total) by mouth once a week, Disp: 12 capsule, Rfl: 0    umeclidinium-vilanterol (Anoro Ellipta) 62 5-25 MCG/INH inhaler, Inhale 1 puff daily (Patient not taking: Reported on 9/2/2020), Disp: 3 Inhaler, Rfl: 3          Portions of the record may have been created with voice recognition software  Occasional wrong word or "sound a like" substitutions may have occurred due to the inherent limitations of voice recognition software  Read the chart carefully and recognize, using context, where substitutions have occurred  If you have any questions, please contact the dictating provider

## 2020-09-17 ENCOUNTER — TELEPHONE (OUTPATIENT)
Dept: NEPHROLOGY | Facility: CLINIC | Age: 75
End: 2020-09-17

## 2020-09-17 NOTE — TELEPHONE ENCOUNTER
This is a Dr Bayron Moreland pt  Pt called because he is wondering when he should start his Vitamin D 1 25 mg  He can be reached at [phone]

## 2020-09-17 NOTE — TELEPHONE ENCOUNTER
Called and informed pt he may start the Vitamin D as soon as possible  He has picked it up from the pharmacy already  Pt had no further questions or concerns

## 2020-11-19 ENCOUNTER — LAB (OUTPATIENT)
Dept: LAB | Facility: HOSPITAL | Age: 75
End: 2020-11-19
Attending: INTERNAL MEDICINE
Payer: COMMERCIAL

## 2020-11-19 ENCOUNTER — TELEPHONE (OUTPATIENT)
Dept: NEPHROLOGY | Facility: CLINIC | Age: 75
End: 2020-11-19

## 2020-11-19 DIAGNOSIS — E55.9 VITAMIN D DEFICIENCY: ICD-10-CM

## 2020-11-19 DIAGNOSIS — N18.31 CKD STAGE G3A/A2, GFR 45-59 AND ALBUMIN CREATININE RATIO 30-299 MG/G (HCC): ICD-10-CM

## 2020-11-19 DIAGNOSIS — I10 ESSENTIAL HYPERTENSION: ICD-10-CM

## 2020-11-19 LAB
25(OH)D3 SERPL-MCNC: 42.5 NG/ML (ref 30–100)
ALBUMIN SERPL BCP-MCNC: 4.4 G/DL (ref 3–5.2)
ANION GAP SERPL CALCULATED.3IONS-SCNC: 7 MMOL/L (ref 5–14)
BUN SERPL-MCNC: 20 MG/DL (ref 5–25)
CALCIUM SERPL-MCNC: 9.9 MG/DL (ref 8.4–10.2)
CHLORIDE SERPL-SCNC: 100 MMOL/L (ref 97–108)
CO2 SERPL-SCNC: 29 MMOL/L (ref 22–30)
CREAT SERPL-MCNC: 1.39 MG/DL (ref 0.7–1.5)
GFR SERPL CREATININE-BSD FRML MDRD: 50 ML/MIN/1.73SQ M
GLUCOSE P FAST SERPL-MCNC: 102 MG/DL (ref 70–99)
PHOSPHATE SERPL-MCNC: 3.3 MG/DL (ref 2.5–4.8)
POTASSIUM SERPL-SCNC: 5 MMOL/L (ref 3.6–5)
PTH-INTACT SERPL-MCNC: 44.6 PG/ML (ref 16.7–78.9)
SODIUM SERPL-SCNC: 136 MMOL/L (ref 137–147)

## 2020-11-19 PROCEDURE — 82306 VITAMIN D 25 HYDROXY: CPT

## 2020-11-19 PROCEDURE — 36415 COLL VENOUS BLD VENIPUNCTURE: CPT

## 2020-11-19 PROCEDURE — 83970 ASSAY OF PARATHORMONE: CPT

## 2020-11-19 PROCEDURE — 80069 RENAL FUNCTION PANEL: CPT

## 2020-11-30 ENCOUNTER — TELEPHONE (OUTPATIENT)
Dept: NEPHROLOGY | Facility: CLINIC | Age: 75
End: 2020-11-30

## 2020-12-16 ENCOUNTER — DOCTOR'S OFFICE (OUTPATIENT)
Dept: URBAN - METROPOLITAN AREA CLINIC 136 | Facility: CLINIC | Age: 75
Setting detail: OPHTHALMOLOGY
End: 2020-12-16
Payer: COMMERCIAL

## 2020-12-16 ENCOUNTER — OPTICAL OFFICE (OUTPATIENT)
Dept: URBAN - METROPOLITAN AREA CLINIC 143 | Facility: CLINIC | Age: 75
Setting detail: OPHTHALMOLOGY
End: 2020-12-16
Payer: COMMERCIAL

## 2020-12-16 DIAGNOSIS — H52.03: ICD-10-CM

## 2020-12-16 DIAGNOSIS — H52.4: ICD-10-CM

## 2020-12-16 DIAGNOSIS — H52.223: ICD-10-CM

## 2020-12-16 PROCEDURE — V2744 TINT PHOTOCHROMATIC LENS/ES: HCPCS | Performed by: OPTOMETRIST

## 2020-12-16 PROCEDURE — 92012 INTRM OPH EXAM EST PATIENT: CPT | Performed by: OPTOMETRIST

## 2020-12-16 PROCEDURE — 92015 DETERMINE REFRACTIVE STATE: CPT | Performed by: OPTOMETRIST

## 2020-12-16 PROCEDURE — V2203 LENS SPHCYL BIFOCAL 4.00D/.1: HCPCS | Performed by: OPTOMETRIST

## 2020-12-16 PROCEDURE — V2020 VISION SVCS FRAMES PURCHASES: HCPCS | Performed by: OPTOMETRIST

## 2020-12-16 ASSESSMENT — AXIALLENGTH_DERIVED
OD_AL: 23.6267
OS_AL: 22.77
OS_AL: 22.6348
OD_AL: 23.4329

## 2020-12-16 ASSESSMENT — KERATOMETRY
OS_K2POWER_DIOPTERS: 45.75
OD_K2POWER_DIOPTERS: 44.50
OD_AXISANGLE_DEGREES: 025
OS_K1POWER_DIOPTERS: 43.50
OS_AXISANGLE_DEGREES: 08
OD_K1POWER_DIOPTERS: 41.25

## 2020-12-16 ASSESSMENT — REFRACTION_CURRENTRX
OD_OVR_VA: 20/
OS_OVR_VA: 20/

## 2020-12-16 ASSESSMENT — SPHEQUIV_DERIVED
OD_SPHEQUIV: 1
OS_SPHEQUIV: 1.5
OD_SPHEQUIV: 0.5
OS_SPHEQUIV: 1.125

## 2020-12-16 ASSESSMENT — REFRACTION_MANIFEST
OS_SPHERE: +2.00
OD_CYLINDER: -1.50
OS_VA1: 20/50
OS_VA2: 20/25
OD_VA1: 20/40
OS_AXIS: 010
OD_VA2: 20/25
OD_SPHERE: +1.25
OD_ADD: +3.00
OS_CYLINDER: -1.75
OD_AXIS: 180
OU_VA: 20/40
OS_ADD: +3.00

## 2020-12-16 ASSESSMENT — VISUAL ACUITY
OS_BCVA: 20/60-2
OD_BCVA: 20/80+1

## 2020-12-16 ASSESSMENT — REFRACTION_AUTOREFRACTION
OD_CYLINDER: -1.50
OS_CYLINDER: -2.00
OD_AXIS: 010
OS_SPHERE: +2.50
OD_SPHERE: +1.75
OS_AXIS: 005

## 2021-01-26 ENCOUNTER — RX ONLY (RX ONLY)
Age: 76
End: 2021-01-26

## 2021-01-26 ENCOUNTER — DOCTOR'S OFFICE (OUTPATIENT)
Dept: URBAN - METROPOLITAN AREA CLINIC 136 | Facility: CLINIC | Age: 76
Setting detail: OPHTHALMOLOGY
End: 2021-01-26
Payer: MEDICARE

## 2021-01-26 DIAGNOSIS — H35.3221: ICD-10-CM

## 2021-01-26 DIAGNOSIS — Z96.1: ICD-10-CM

## 2021-01-26 DIAGNOSIS — H35.3112: ICD-10-CM

## 2021-01-26 DIAGNOSIS — H35.371: ICD-10-CM

## 2021-01-26 PROBLEM — H52.4 HYPEROPIA, ASTIGMATISM, PRESBYOPIA OU ; BOTH EYES: Status: ACTIVE | Noted: 2018-01-05

## 2021-01-26 PROBLEM — H52.223 HYPEROPIA, ASTIGMATISM, PRESBYOPIA OU ; BOTH EYES: Status: ACTIVE | Noted: 2018-01-05

## 2021-01-26 PROBLEM — D21.0 BENIGN LESION FACE: Status: ACTIVE | Noted: 2019-01-04

## 2021-01-26 PROBLEM — F17.200 TOBACCO USE DISORDER: Status: ACTIVE | Noted: 2019-07-02

## 2021-01-26 PROBLEM — H53.031 AMBLYOPIA STRABISMIC; RIGHT EYE: Status: ACTIVE | Noted: 2019-01-04

## 2021-01-26 PROBLEM — H26.40 POSTERIOR CAPSULAR OPACIFICATION ; BOTH EYES: Status: ACTIVE | Noted: 2018-04-02

## 2021-01-26 PROBLEM — H52.03 HYPEROPIA, ASTIGMATISM, PRESBYOPIA OU ; BOTH EYES: Status: ACTIVE | Noted: 2018-01-05

## 2021-01-26 PROCEDURE — 67028 INJECTION EYE DRUG: CPT | Performed by: OPHTHALMOLOGY

## 2021-01-26 PROCEDURE — 92134 CPTRZ OPH DX IMG PST SGM RTA: CPT | Performed by: OPHTHALMOLOGY

## 2021-01-26 PROCEDURE — 92014 COMPRE OPH EXAM EST PT 1/>: CPT | Performed by: OPHTHALMOLOGY

## 2021-01-26 PROCEDURE — SAMPLE SAMPLE MEDICATION: Performed by: OPHTHALMOLOGY

## 2021-01-26 ASSESSMENT — TONOMETRY
OD_IOP_MMHG: 10
OS_IOP_MMHG: 09

## 2021-01-26 ASSESSMENT — REFRACTION_MANIFEST
OD_CYLINDER: -1.50
OS_SPHERE: +2.00
OS_VA2: 20/25
OU_VA: 20/40
OS_ADD: +3.00
OD_AXIS: 180
OD_SPHERE: +1.25
OS_AXIS: 010
OD_VA2: 20/25
OD_ADD: +3.00
OS_VA1: 20/50
OS_CYLINDER: -1.75
OD_VA1: 20/40

## 2021-01-26 ASSESSMENT — REFRACTION_AUTOREFRACTION
OS_SPHERE: +2.50
OS_AXIS: 005
OD_AXIS: 010
OD_CYLINDER: -1.50
OS_CYLINDER: -2.00
OD_SPHERE: +1.75

## 2021-01-26 ASSESSMENT — SPHEQUIV_DERIVED
OD_SPHEQUIV: 0.5
OS_SPHEQUIV: 1.5
OS_SPHEQUIV: 1.125
OD_SPHEQUIV: 1

## 2021-01-26 ASSESSMENT — REFRACTION_CURRENTRX
OD_OVR_VA: 20/
OS_OVR_VA: 20/

## 2021-01-26 ASSESSMENT — KERATOMETRY
OS_AXISANGLE_DEGREES: 08
OS_K2POWER_DIOPTERS: 45.75
OS_K1POWER_DIOPTERS: 43.50
OD_AXISANGLE_DEGREES: 025
OD_K1POWER_DIOPTERS: 41.25
OD_K2POWER_DIOPTERS: 44.50

## 2021-01-26 ASSESSMENT — AXIALLENGTH_DERIVED
OS_AL: 22.6348
OD_AL: 23.4329
OS_AL: 22.77
OD_AL: 23.6267

## 2021-01-26 ASSESSMENT — CONFRONTATIONAL VISUAL FIELD TEST (CVF)
OD_FINDINGS: CONSTRICTION
OS_FINDINGS: CONSTRICTION
OS_COMMENTS: UNABLE TO FOLLOW DIRECTIONS
OD_COMMENTS: UNABLE TO FOLLOW DIRECTIONS

## 2021-01-26 ASSESSMENT — VISUAL ACUITY
OD_BCVA: 20/100
OS_BCVA: 20/40-2

## 2021-02-24 ENCOUNTER — OFFICE VISIT (OUTPATIENT)
Dept: NEPHROLOGY | Facility: CLINIC | Age: 76
End: 2021-02-24
Payer: COMMERCIAL

## 2021-02-24 VITALS
DIASTOLIC BLOOD PRESSURE: 64 MMHG | SYSTOLIC BLOOD PRESSURE: 138 MMHG | HEIGHT: 69 IN | WEIGHT: 183 LBS | BODY MASS INDEX: 27.11 KG/M2

## 2021-02-24 DIAGNOSIS — N28.1 CYST OF KIDNEY, ACQUIRED: ICD-10-CM

## 2021-02-24 DIAGNOSIS — I10 ESSENTIAL HYPERTENSION: ICD-10-CM

## 2021-02-24 DIAGNOSIS — E78.5 HYPERLIPIDEMIA, UNSPECIFIED HYPERLIPIDEMIA TYPE: ICD-10-CM

## 2021-02-24 DIAGNOSIS — N18.31 CKD STAGE G3A/A2, GFR 45-59 AND ALBUMIN CREATININE RATIO 30-299 MG/G (HCC): Primary | ICD-10-CM

## 2021-02-24 DIAGNOSIS — E55.9 VITAMIN D DEFICIENCY: ICD-10-CM

## 2021-02-24 PROCEDURE — 1160F RVW MEDS BY RX/DR IN RCRD: CPT | Performed by: INTERNAL MEDICINE

## 2021-02-24 PROCEDURE — 3078F DIAST BP <80 MM HG: CPT | Performed by: INTERNAL MEDICINE

## 2021-02-24 PROCEDURE — 3075F SYST BP GE 130 - 139MM HG: CPT | Performed by: INTERNAL MEDICINE

## 2021-02-24 PROCEDURE — 1036F TOBACCO NON-USER: CPT | Performed by: INTERNAL MEDICINE

## 2021-02-24 PROCEDURE — 3008F BODY MASS INDEX DOCD: CPT | Performed by: INTERNAL MEDICINE

## 2021-02-24 PROCEDURE — 99214 OFFICE O/P EST MOD 30 MIN: CPT | Performed by: INTERNAL MEDICINE

## 2021-02-24 NOTE — PATIENT INSTRUCTIONS
- start vit D 2000 units over the counter  - get kidney ultrasound in 3 months    - Please call me in 10 days after having your blood work done to review the results if you do not hear back from me or my office, as I may have not received the results  - please remember to perform blood work prior to the next visit  - Please call if the blood pressure top number is greater than 150 or less than 110 consistently  - Please call if you are gaining more than 2lbs in 2 days for adjustment of water pills   ~ Please AVOID the following pain medications  LIST OF NSAIDS (NONSTEROIDAL ANTI-INFLAMMATORY DRUGS) AND CHRISTIANSON-2 INHIBITORS    DIFLUNISAL (DOLOBID)  IBUPROFEN (MOTRIN, ADVIL)  FLURBIPROFEN (ANSAID)  KETOPROFEN (ORUDIS, ORUVAIL)  FENOPROFEN (NALFON)  NABUMETONE (RELAFEN)  PIROXICAM (FELDENE)  NAPROXEN (ALEVE, NAPROSYN, NAPRELAN, ANAPROX)  DICLOFENAC (VOLTAREN, CATAFLAM)  INDOMETHACIN (INDOCIN)  SULINDAC (CLINORIL)  TOLMETIN (TOLETIN)  ETODOLAC (LODINE)  MELOXICAM (MOBIC)  KETOROLAC (TORADOL)  OXAPROZIN (DAYPRO)  CELECOXIB (CELEBREX)    Phosphorus diet  Follow a low phosphorus diet      Avoid these higher phosphorus foods: Choose these lower phosphorus foods:   Milk, pudding or yogurt (from animals and from many soy varieties) Rice milk (unfortified), nondairy creamer (if it doesn't have terms in the ingredients list that contain the letters "phos")   Hard cheeses, ricotta or cottage cheese, fat-free cream cheese Regular and low-fat cream cheese   Ice cream or frozen yogurt Sherbet or frozen fruit pops   Soups made with higher phosphorus ingredients (milk, dried peas, beans, lentils) Soups made with lower phosphorus ingredients (broth- or water-based with other lower phosphorus ingredients)   Whole grains, including whole-grain breads, crackers, cereal, rice and pasta Refined grains, including white bread, crackers, cereals, rice and pasta   Quick breads, biscuits, cornbread, muffins, pancakes or waffles Homemade refined (white) dinner rolls, bagels or English muffins   Dried peas (split, black-eyed), beans (black, garbanzo, lima, kidney, navy, garcia) or lentils Green peas (canned, frozen), green beans or wax beans   Organ meats, walleye, pollock or sardines Lean beef, pork, lamb, poultry or other fish   Nuts and seeds Popcorn   Peanut butter and other nut butters Jam, jelly or honey   Chocolate, including chocolate drinks Carob (chocolate-flavored) candy, hard candy or gumdrops   Radha and pepper-type sodas, flavored mascorro, bottled teas (if a term in the ingredients list contains the letters "phos") Lemon-lime soda, ginger ale or root beer, plain water     Follow a moderate potassium diet  Things to do to reduce your blood pressure include working with all your physician to do the following:  ~ stop smoking if you smoke  ~ increase cardiovascular exercise like walking and swimming    ~ modify your diet to decrease fat and salt intake  ~ reduce your weight if you are overweight or obese   ~ increase the consumption of fruits, vegetables and whole grains  ~ decrease alcohol consumption if you consume alcohol    ~ try to minimize stress in your life with lifestyle modifications  ~ be compliant with your anti-hypertensive medications  ~ adjust your medications to help improve your vascular stiffness and decrease risks for heart attacks and strokes

## 2021-02-24 NOTE — PROGRESS NOTES
Nephrology Follow up Consultation  Francesca Joaquin  77 y o  male MRN: [id number]            BACKGROUND:  Francesca Joaquin  is a 77 y o male who was referred by Mary Knapp MD for evaluation of Chronic Kidney Disease, Follow-up, and Hypertension    ASSESSMENT / PLAN:   77 y o   male with pmh of multiple co-morbidities including  hypertension  (x 5yrs), COPD, hyperlipidemia and CKD stage 2/3 presents to the office for routine follow-up       1  CKD stage 2/3AA2:  - Patient has a baseline creatinine of 1 2-1 4 mg/dL  Most recent labs show a Creatinine of  1 39  mg/dL on 11/19/2020  Renal function remains stable at baseline    - likely has underlying CKD secondary to age-related nephron loss plus hypertensive nephrosclerosis  - renal ultrasound from 09/11/2019 showed right kidney 10 cm, 2 x 2 cm cyst in the right kidney and a septated cyst in the left kidney  Left kidney size not documented, no hydronephrosis  -renal ultrasound from 08/12/2020 showed bilateral medical renal disease, bilateral renal cysts, left kidney cyst partially septated may repeat ultrasound in 1 year  -  Ordered for repeat ultrasound to be done prior to next visit  - Proteinuria -  protein creatinine ratio of 190 mg as of August 2020  - Acid base and lytes stable  except   Borderline elevated potassium levels advised patient of dietary habits  - Recommend to avoid use of NSAIDs, nephrotoxins  Caution advised with regards to exposure to IV contrast dye    - Discussed with the patient in depth his renal status, including the possible etiologies for CKD  - Advised the patient that when his GFR is close to 20mL/min then will start discussing about RRT(renal replacement therapy) options such as renal transplant, peritoneal dialysis and hemodialysis  - Informed the patient about the various options for Renal Replacement therapy    - Discussed with the patient how we need to work together to delay the progression of CKD with optimal BP control based on their age and co-morbidities and trying to reduce proteinuria by the use of anti-proteinuric agents       2  Hypertension:  - Patient is on lisinopril 40 mg po QHS, norvasc 5mg po q24    - Goal BP of <  140/90 based on age and comorbidities  - Instructed to follow low sodium (2gm)diet   - Advised to hold ACEI/ARBs if patient suffers from dehydration due to gastrointestinal losses due to risk of LILLY secondary to failure to autoregulate      3  Hemoglobin:  - Goal Hb of 10-12 g/dL  - Most recent labs suggestive of 15 4 g per dL  - no role for IV iron at this time     4  CKD-MBD(Mineral Bone Disease)/vitamin-D deficiency:  - Based on patients CKD stage following is the goal of therapy  - Maintain calcium phosphorus product of < 55   - Stage 3 CKD - Goal Ca 8 5-10 mg/dL , goal Phos 2 7-4 6 mg/dL  , goal iPTH 30-70 pg/mL  - Patient is currently at goal   -  start vitamin-D 2000 units p o  Q day   - most recent vitamin-D level of  42 5 and intact PTH of  44 6 as of  November 2020  - check intact PTH and vitamin-D, prior to next visit     5  Lipids:  - goal LDL less than 70  - Management as per PCP     6  Hearing loss:  - management as per Primary team     7  COPD:  - Management as per primary team  - on inhalers     8  Nutrition:  - Encouraged patient to follow a renal diet comprising of moderate potassium, low phosphorus and protein restriction to 0 8gm/kg  - Will check serum albumin with next blood work       9  Followup:  - Patient is to follow-up in 6 months, with lab work to be performed in a few days prior to the visit  Advised patient to call me in 10 days to review the results if they do not hear back from me, as I may have not received the results  Georgina Mckeon MD, [facility], 2/24/2021, 12:33 PM             SUBJECTIVE: 77 y o  male presents to the office for routine follow-up    Doing well has no complaints no recent hospitalization no NSAID use no issues with edema not checking blood pressures at home happy to hear renal parameters are stable blood pressures are doing well now  Thankful for all the care information that he has gotten today  Review of Systems   Constitutional: Negative for appetite change, chills, fatigue and fever  HENT: Negative for congestion  Respiratory: Negative for cough, shortness of breath and wheezing  Cardiovascular: Negative for leg swelling  Gastrointestinal: Negative for abdominal pain, constipation, diarrhea, nausea and vomiting  Genitourinary: Negative for difficulty urinating, dysuria and hematuria  Musculoskeletal: Negative for back pain  Skin: Negative for rash and wound  Neurological: Negative for dizziness and headaches  Psychiatric/Behavioral: Negative for agitation and confusion  All other systems reviewed and are negative  PAST MEDICAL HISTORY:  Past Medical History:   Diagnosis Date    CKD (chronic kidney disease)     Hypertension     Microalbuminuria        PROBLEM LIST    Patient Active Problem List   Diagnosis    Presbycusis    Osteopenia    Essential hypertension    [facility]ch[facility] pulmonary disease) ([facility] [address] [facility]    Tachycardia    Screening for colon cancer    Hyperlipidemia    CKD stage G3a/A2, GFR 45-59 and albumin creatinine ratio  mg/g    Cyst of kidney, acquired    Vitamin D deficiency       PAST SURGICAL HISTORY:  Past Surgical History:   Procedure Laterality Date    APPENDECTOMY      HERNIA REPAIR      STRABISMUS SURGERY         SOCIAL HISTORY :   reports that he has quit smoking  His smoking use included cigarettes  He started smoking about 2 years ago  He has never used smokeless tobacco  He reports current alcohol use  He reports that he does not use drugs      FAMILY HISTORY:  Family History   Problem Relation Age of Onset    No Known Problems Mother     No Known Problems Father        ALLERGIES:  No Known Allergies        PHYSICAL EXAM:  Vitals:    02/24/21 1141   BP: 138/64   Weight: 83 kg (183 lb)   Height: 5' 9" (1 753 m)     Body mass index is 27 02 kg/m²  Physical Exam  Vitals signs reviewed  Constitutional:       Appearance: Normal appearance  He is normal weight  He is not ill-appearing, toxic-appearing or diaphoretic  HENT:      Head: Normocephalic and atraumatic  Mouth/Throat:      Mouth: Mucous membranes are moist       Pharynx: Oropharynx is clear  No oropharyngeal exudate  Eyes:      General: No scleral icterus  Conjunctiva/sclera: Conjunctivae normal    Neck:      Musculoskeletal: Normal range of motion and neck supple  Cardiovascular:      Rate and Rhythm: Normal rate  Heart sounds: Normal heart sounds  No friction rub  Pulmonary:      Effort: Pulmonary effort is normal  No respiratory distress  Breath sounds: Normal breath sounds  No stridor  No wheezing  Abdominal:      General: There is no distension  Palpations: Abdomen is soft  There is no mass  Tenderness: There is no abdominal tenderness  Musculoskeletal:         General: No swelling  Skin:     General: Skin is warm  Coloration: Skin is not jaundiced  Neurological:      General: No focal deficit present  Mental Status: He is alert and oriented to person, place, and time     Psychiatric:         Mood and Affect: Mood normal          Behavior: Behavior normal          LABORATORY DATA:     Results from last 6 Months   Lab Units 11/19/20  1039   POTASSIUM mmol/L 5 0   CHLORIDE mmol/L 100   CO2 mmol/L 29   BUN mg/dL 20   CREATININE mg/dL 1 39   CALCIUM mg/dL 9 9   PHOSPHORUS mg/dL 3 3        rest all reviewed    RADIOLOGY:  US kidney and bladder    (Results Pending)     Rest all reviewed        MEDICATIONS:    Current Outpatient Medications:     albuterol (Ventolin HFA) 90 mcg/act inhaler, Inhale 2 puffs every 6 (six) hours as needed for wheezing or shortness of breath (15 minutes before exercise), Disp: 18 g, Rfl: 5    amLODIPine (NORVASC) 5 mg tablet, Take 1 tablet (5 mg total) by mouth daily, Disp: 90 tablet, Rfl: 3    lisinopril (ZESTRIL) 40 mg tablet, Take 1 tablet (40 mg total) by mouth every evening, Disp: 90 tablet, Rfl: 3    ergocalciferol (ERGOCALCIFEROL) 1 25 MG (57267 UT) capsule, Take 1 capsule (50,000 Units total) by mouth once a week (Patient not taking: Reported on 2/24/2021), Disp: 12 capsule, Rfl: 0    umeclidinium-vilanterol (Anoro Ellipta) 62 5-25 MCG/INH inhaler, Inhale 1 puff daily (Patient not taking: Reported on 9/2/2020), Disp: 3 Inhaler, Rfl: 3          Portions of the record may have been created with voice recognition software  Occasional wrong word or "sound a like" substitutions may have occurred due to the inherent limitations of voice recognition software  Read the chart carefully and recognize, using context, where substitutions have occurred  If you have any questions, please contact the dictating provider

## 2021-04-20 ENCOUNTER — APPOINTMENT (EMERGENCY)
Dept: CT IMAGING | Facility: HOSPITAL | Age: 76
End: 2021-04-20
Payer: COMMERCIAL

## 2021-04-20 ENCOUNTER — HOSPITAL ENCOUNTER (OUTPATIENT)
Facility: HOSPITAL | Age: 76
Setting detail: OBSERVATION
Discharge: HOME | End: 2021-04-22
Attending: EMERGENCY MEDICINE | Admitting: FAMILY MEDICINE
Payer: COMMERCIAL

## 2021-04-20 ENCOUNTER — IMMUNIZATIONS (OUTPATIENT)
Dept: FAMILY MEDICINE CLINIC | Facility: HOSPITAL | Age: 76
End: 2021-04-20

## 2021-04-20 DIAGNOSIS — J44.9 CHRONIC OBSTRUCTIVE PULMONARY DISEASE, UNSPECIFIED COPD TYPE (HCC): ICD-10-CM

## 2021-04-20 DIAGNOSIS — R55 NEAR SYNCOPE: ICD-10-CM

## 2021-04-20 DIAGNOSIS — Z23 ENCOUNTER FOR IMMUNIZATION: Primary | ICD-10-CM

## 2021-04-20 DIAGNOSIS — R42 DIZZINESS: ICD-10-CM

## 2021-04-20 DIAGNOSIS — N17.9 AKI (ACUTE KIDNEY INJURY) (HCC): Primary | ICD-10-CM

## 2021-04-20 PROBLEM — N18.9 ACUTE KIDNEY INJURY SUPERIMPOSED ON CHRONIC KIDNEY DISEASE (HCC): Status: ACTIVE | Noted: 2021-04-20

## 2021-04-20 LAB
ALBUMIN SERPL BCP-MCNC: 4.4 G/DL (ref 3–5.2)
ALP SERPL-CCNC: 99 U/L (ref 43–122)
ALT SERPL W P-5'-P-CCNC: 31 U/L
ANION GAP SERPL CALCULATED.3IONS-SCNC: 5 MMOL/L (ref 5–14)
ANION GAP SERPL CALCULATED.3IONS-SCNC: 8 MMOL/L (ref 5–14)
AST SERPL W P-5'-P-CCNC: 25 U/L (ref 17–59)
ATRIAL RATE: 95 BPM
BACTERIA UR QL AUTO: ABNORMAL /HPF
BASOPHILS # BLD AUTO: 0.1 THOUSANDS/ΜL (ref 0–0.1)
BASOPHILS NFR BLD AUTO: 1 % (ref 0–1)
BILIRUB SERPL-MCNC: 0.41 MG/DL
BILIRUB UR QL STRIP: NEGATIVE
BUN SERPL-MCNC: 25 MG/DL (ref 5–25)
BUN SERPL-MCNC: 27 MG/DL (ref 5–25)
CALCIUM SERPL-MCNC: 8.5 MG/DL (ref 8.4–10.2)
CALCIUM SERPL-MCNC: 9.7 MG/DL (ref 8.4–10.2)
CHLORIDE SERPL-SCNC: 100 MMOL/L (ref 97–108)
CHLORIDE SERPL-SCNC: 106 MMOL/L (ref 97–108)
CLARITY UR: CLEAR
CO2 SERPL-SCNC: 26 MMOL/L (ref 22–30)
CO2 SERPL-SCNC: 29 MMOL/L (ref 22–30)
COLOR UR: YELLOW
CREAT SERPL-MCNC: 1.68 MG/DL (ref 0.7–1.5)
CREAT SERPL-MCNC: 2.14 MG/DL (ref 0.7–1.5)
EOSINOPHIL # BLD AUTO: 0.1 THOUSAND/ΜL (ref 0–0.4)
EOSINOPHIL NFR BLD AUTO: 1 % (ref 0–6)
ERYTHROCYTE [DISTWIDTH] IN BLOOD BY AUTOMATED COUNT: 13.2 %
GFR SERPL CREATININE-BSD FRML MDRD: 29 ML/MIN/1.73SQ M
GFR SERPL CREATININE-BSD FRML MDRD: 39 ML/MIN/1.73SQ M
GLUCOSE SERPL-MCNC: 110 MG/DL (ref 70–99)
GLUCOSE SERPL-MCNC: 96 MG/DL (ref 70–99)
GLUCOSE UR STRIP-MCNC: NEGATIVE MG/DL
HCT VFR BLD AUTO: 40.4 % (ref 41–53)
HGB BLD-MCNC: 13.4 G/DL (ref 13.5–17.5)
HGB UR QL STRIP.AUTO: NEGATIVE
HYALINE CASTS #/AREA URNS LPF: ABNORMAL /LPF
KETONES UR STRIP-MCNC: NEGATIVE MG/DL
LEUKOCYTE ESTERASE UR QL STRIP: NEGATIVE
LYMPHOCYTES # BLD AUTO: 0.9 THOUSANDS/ΜL (ref 0.5–4)
LYMPHOCYTES NFR BLD AUTO: 10 % (ref 25–45)
MAGNESIUM SERPL-MCNC: 1.9 MG/DL (ref 1.6–2.3)
MCH RBC QN AUTO: 28.3 PG (ref 26–34)
MCHC RBC AUTO-ENTMCNC: 33.2 G/DL (ref 31–36)
MCV RBC AUTO: 85 FL (ref 80–100)
MONOCYTES # BLD AUTO: 0.9 THOUSAND/ΜL (ref 0.2–0.9)
MONOCYTES NFR BLD AUTO: 10 % (ref 1–10)
NEUTROPHILS # BLD AUTO: 6.8 THOUSANDS/ΜL (ref 1.8–7.8)
NEUTS SEG NFR BLD AUTO: 78 % (ref 45–65)
NITRITE UR QL STRIP: NEGATIVE
NON-SQ EPI CELLS URNS QL MICRO: ABNORMAL /HPF
P AXIS: 62 DEGREES
PH UR STRIP.AUTO: 6 [PH]
PHOSPHATE SERPL-MCNC: 3.1 MG/DL (ref 2.5–4.8)
PLATELET # BLD AUTO: 311 THOUSANDS/UL (ref 150–450)
PMV BLD AUTO: 7.2 FL (ref 8.9–12.7)
POTASSIUM SERPL-SCNC: 4.2 MMOL/L (ref 3.6–5)
POTASSIUM SERPL-SCNC: 4.7 MMOL/L (ref 3.6–5)
PR INTERVAL: 148 MS
PROT SERPL-MCNC: 7.9 G/DL (ref 5.9–8.4)
PROT UR STRIP-MCNC: ABNORMAL MG/DL
QRS AXIS: 63 DEGREES
QRSD INTERVAL: 76 MS
QT INTERVAL: 322 MS
QTC INTERVAL: 404 MS
RBC # BLD AUTO: 4.74 MILLION/UL (ref 4.5–5.9)
RBC #/AREA URNS AUTO: ABNORMAL /HPF
SODIUM SERPL-SCNC: 137 MMOL/L (ref 137–147)
SODIUM SERPL-SCNC: 137 MMOL/L (ref 137–147)
SP GR UR STRIP.AUTO: 1.02 (ref 1–1.04)
T WAVE AXIS: 49 DEGREES
TROPONIN I SERPL-MCNC: <0.01 NG/ML (ref 0–0.03)
UROBILINOGEN UA: NEGATIVE MG/DL
VENTRICULAR RATE: 95 BPM
WBC # BLD AUTO: 8.7 THOUSAND/UL (ref 4.5–11)
WBC #/AREA URNS AUTO: ABNORMAL /HPF

## 2021-04-20 PROCEDURE — 80048 BASIC METABOLIC PNL TOTAL CA: CPT | Performed by: PHYSICIAN ASSISTANT

## 2021-04-20 PROCEDURE — 36415 COLL VENOUS BLD VENIPUNCTURE: CPT | Performed by: PHYSICIAN ASSISTANT

## 2021-04-20 PROCEDURE — 80053 COMPREHEN METABOLIC PANEL: CPT | Performed by: PHYSICIAN ASSISTANT

## 2021-04-20 PROCEDURE — 93010 ELECTROCARDIOGRAM REPORT: CPT | Performed by: INTERNAL MEDICINE

## 2021-04-20 PROCEDURE — 99219 PR INITIAL OBSERVATION CARE/DAY 50 MINUTES: CPT | Performed by: FAMILY MEDICINE

## 2021-04-20 PROCEDURE — 96360 HYDRATION IV INFUSION INIT: CPT

## 2021-04-20 PROCEDURE — 84100 ASSAY OF PHOSPHORUS: CPT | Performed by: PHYSICIAN ASSISTANT

## 2021-04-20 PROCEDURE — 81003 URINALYSIS AUTO W/O SCOPE: CPT | Performed by: PHYSICIAN ASSISTANT

## 2021-04-20 PROCEDURE — 85025 COMPLETE CBC W/AUTO DIFF WBC: CPT | Performed by: PHYSICIAN ASSISTANT

## 2021-04-20 PROCEDURE — 83735 ASSAY OF MAGNESIUM: CPT | Performed by: PHYSICIAN ASSISTANT

## 2021-04-20 PROCEDURE — G1004 CDSM NDSC: HCPCS

## 2021-04-20 PROCEDURE — 0011A SARS-COV-2 / COVID-19 MRNA VACCINE (MODERNA) 100 MCG: CPT

## 2021-04-20 PROCEDURE — 91301 SARS-COV-2 / COVID-19 MRNA VACCINE (MODERNA) 100 MCG: CPT

## 2021-04-20 PROCEDURE — 81001 URINALYSIS AUTO W/SCOPE: CPT | Performed by: PHYSICIAN ASSISTANT

## 2021-04-20 PROCEDURE — 99285 EMERGENCY DEPT VISIT HI MDM: CPT | Performed by: PHYSICIAN ASSISTANT

## 2021-04-20 PROCEDURE — 93005 ELECTROCARDIOGRAM TRACING: CPT

## 2021-04-20 PROCEDURE — 84484 ASSAY OF TROPONIN QUANT: CPT | Performed by: PHYSICIAN ASSISTANT

## 2021-04-20 PROCEDURE — 99285 EMERGENCY DEPT VISIT HI MDM: CPT

## 2021-04-20 PROCEDURE — 74176 CT ABD & PELVIS W/O CONTRAST: CPT

## 2021-04-20 PROCEDURE — 96361 HYDRATE IV INFUSION ADD-ON: CPT

## 2021-04-20 RX ADMIN — SODIUM CHLORIDE 1000 ML: 0.9 INJECTION, SOLUTION INTRAVENOUS at 15:44

## 2021-04-20 NOTE — QUICK NOTE
Received sign out from  day team regarding this pt presenting earlier today w/ dizziness post COVID vaccine  Labs were reviewed, and was notable for elevated Cr ( 2 14 from baseline of 1 20-1 40 Per nephrology note)  Electrolytes WNL & EKG also showed normal sinus rhythm ( no previous EKG on file to compare to)  Ct renal stone study was negative for findings of caliculi  Pt was started on NS bolus which is currently in progress  Would like to see repeat BMP to re-evaluate Cr level once bolus is approximately half way complete   Pt has expressed that he would like to go home today if he can  He was informed of the labs results above and understands & agrees with current plan  Discussed the above w/ Dr Nancy Urena who also agrees with this plan at this time

## 2021-04-20 NOTE — ED PROVIDER NOTES
History  Chief Complaint   Patient presents with    Dizziness     states that he began to get dizzy about 10-15 min after vaccine  staets he does not feel dizzy now  pt denies hitting head     79-year-old male with past medical history  CKD, hypertension presenting for evaluation dizziness  Patient reports he was upstairs in the hospital this morning to get his COVID vaccination  Patient states he waited the 15 minutes in the auditorium and then left to wait outside for his ride  Pt reports he was waiting in the sun instead of the shade when he started to feel dizzy and someone helped him to the ground  He reports he did not pass out or lose consciousness  No hitting his head  Currently, his symptoms are completely resolved  No cp, sob, abdominal pain, headache  Prior to Admission Medications   Prescriptions Last Dose Informant Patient Reported? Taking?    albuterol (Ventolin HFA) 90 mcg/act inhaler   No Yes   Sig: Inhale 2 puffs every 6 (six) hours as needed for wheezing or shortness of breath (15 minutes before exercise)   amLODIPine (NORVASC) 5 mg tablet   No Yes   Sig: Take 1 tablet (5 mg total) by mouth daily   ergocalciferol (ERGOCALCIFEROL) 1 25 MG (14270 UT) capsule   No Yes   Sig: Take 1 capsule (50,000 Units total) by mouth once a week   lisinopril (ZESTRIL) 40 mg tablet   No Yes   Sig: Take 1 tablet (40 mg total) by mouth every evening   umeclidinium-vilanterol (Anoro Ellipta) 62 5-25 MCG/INH inhaler Not Taking at Unknown time  No No   Sig: Inhale 1 puff daily   Patient not taking: Reported on 9/2/2020      Facility-Administered Medications: None       Past Medical History:   Diagnosis Date    CKD (chronic kidney disease)     Hypertension     Microalbuminuria        Past Surgical History:   Procedure Laterality Date    APPENDECTOMY      HERNIA REPAIR      STRABISMUS SURGERY         Family History   Problem Relation Age of Onset    No Known Problems Mother     No Known Problems Father I have reviewed and agree with the history as documented  E-Cigarette/Vaping    E-Cigarette Use Never User      E-Cigarette/Vaping Substances     Social History     Tobacco Use    Smoking status: Former Smoker     Types: Cigarettes     Start date: 12/18/2018    Smokeless tobacco: Never Used   Substance Use Topics    Alcohol use: Yes     Frequency: Monthly or less     Drinks per session: 1 or 2     Binge frequency: Never     Comment: Rarely; once a year    Drug use: Never       Review of Systems   All other systems reviewed and are negative  Physical Exam  Physical Exam  Vitals signs and nursing note reviewed  Constitutional:       General: He is not in acute distress  Appearance: Normal appearance  He is well-developed  He is obese  He is not ill-appearing, toxic-appearing or diaphoretic  Comments: Hard of hearing   HENT:      Head: Normocephalic and atraumatic  Eyes:      Extraocular Movements: Extraocular movements intact  Conjunctiva/sclera: Conjunctivae normal       Pupils: Pupils are equal, round, and reactive to light  Comments: EOM grossly intact   Neck:      Musculoskeletal: Normal range of motion and neck supple  Vascular: No JVD  Cardiovascular:      Rate and Rhythm: Normal rate  Pulmonary:      Effort: Pulmonary effort is normal  No respiratory distress  Breath sounds: No wheezing  Abdominal:      General: There is no distension  Palpations: Abdomen is soft  Tenderness: There is no abdominal tenderness  Musculoskeletal:      Comments: Pt ambulated at side of bed and yelled 'I dont feel dizzy anymore!', FROM, steady gait, cap refill brisk, strength and sensation grossly intact throughout   Skin:     General: Skin is warm and dry  Capillary Refill: Capillary refill takes less than 2 seconds  Neurological:      General: No focal deficit present  Mental Status: He is alert and oriented to person, place, and time        Cranial Nerves: No cranial nerve deficit  Motor: No weakness        Coordination: Coordination normal    Psychiatric:         Behavior: Behavior normal          Vital Signs  ED Triage Vitals   Temperature Pulse Respirations Blood Pressure SpO2   04/20/21 1332 04/20/21 1332 04/20/21 1332 04/20/21 1332 04/20/21 1332   97 5 °F (36 4 °C) 102 20 148/73 94 %      Temp Source Heart Rate Source Patient Position - Orthostatic VS BP Location FiO2 (%)   04/20/21 1703 04/20/21 1332 04/20/21 1703 04/20/21 1703 --   Tympanic Monitor Lying Left arm       Pain Score       04/20/21 1917       No Pain           Vitals:    04/20/21 1332 04/20/21 1703 04/20/21 1917   BP: 148/73 136/75 141/65   Pulse: 102 85 83   Patient Position - Orthostatic VS:  Lying          Visual Acuity      ED Medications  Medications   sodium chloride 0 9 % bolus 1,000 mL (0 mL Intravenous Stopped 4/20/21 2011)       Diagnostic Studies  Results Reviewed     Procedure Component Value Units Date/Time    Basic metabolic panel [[id number]]  (Abnormal) Collected: 04/20/21 1926    Lab Status: Final result Specimen: Blood from Arm, Left Updated: 04/20/21 1942     Sodium 137 mmol/L      Potassium 4 2 mmol/L      Chloride 106 mmol/L      CO2 26 mmol/L      ANION GAP 5 mmol/L      BUN 25 mg/dL      Creatinine 1 68 mg/dL      Glucose 96 mg/dL      Calcium 8 5 mg/dL      eGFR 39 ml/min/1 73sq m     Narrative:      [facility] guidelines for Chronic Kidney Disease (CKD):     Stage 1 with normal or high GFR (GFR > 90 mL/min/1 73 square meters)    Stage 2 Mild CKD (GFR = 60-89 mL/min/1 73 square meters)    Stage 3A Moderate CKD (GFR = 45-59 mL/min/1 73 square meters)    Stage 3B Moderate CKD (GFR = 30-44 mL/min/1 73 square meters)    Stage 4 Severe CKD (GFR = 15-29 mL/min/1 73 square meters)    Stage 5 End Stage CKD (GFR <15 mL/min/1 73 square meters)  Note: GFR calculation is accurate only with a steady state creatinine    Troponin I [[id number]] (Normal) Collected: 04/20/21 1542    Lab Status: Final result Specimen: Blood from Arm, Right Updated: 04/20/21 1618     Troponin I <0 01 ng/mL     Magnesium [[id number]]  (Normal) Collected: 04/20/21 1542    Lab Status: Final result Specimen: Blood from Arm, Right Updated: 04/20/21 1605     Magnesium 1 9 mg/dL     Phosphorus [[id number]]  (Normal) Collected: 04/20/21 1542    Lab Status: Final result Specimen: Blood from Arm, Right Updated: 04/20/21 1605     Phosphorus 3 1 mg/dL     Comprehensive metabolic panel [[id number]]  (Abnormal) Collected: 04/20/21 1542    Lab Status: Final result Specimen: Blood from Arm, Right Updated: 04/20/21 1605     Sodium 137 mmol/L      Potassium 4 7 mmol/L      Chloride 100 mmol/L      CO2 29 mmol/L      ANION GAP 8 mmol/L      BUN 27 mg/dL      Creatinine 2 14 mg/dL      Glucose 110 mg/dL      Calcium 9 7 mg/dL      AST 25 U/L      ALT 31 U/L      Alkaline Phosphatase 99 U/L      Total Protein 7 9 g/dL      Albumin 4 4 g/dL      Total Bilirubin 0 41 mg/dL      eGFR 29 ml/min/1 73sq m     Narrative:      [facility] guidelines for Chronic Kidney Disease (CKD):     Stage 1 with normal or high GFR (GFR > 90 mL/min/1 73 square meters)    Stage 2 Mild CKD (GFR = 60-89 mL/min/1 73 square meters)    Stage 3A Moderate CKD (GFR = 45-59 mL/min/1 73 square meters)    Stage 3B Moderate CKD (GFR = 30-44 mL/min/1 73 square meters)    Stage 4 Severe CKD (GFR = 15-29 mL/min/1 73 square meters)    Stage 5 End Stage CKD (GFR <15 mL/min/1 73 square meters)  Note: GFR calculation is accurate only with a steady state creatinine    CBC and differential [[id number]]  (Abnormal) Collected: 04/20/21 1542    Lab Status: Final result Specimen: Blood from Arm, Right Updated: 04/20/21 1600     WBC 8 70 Thousand/uL      RBC 4 74 Million/uL      Hemoglobin 13 4 g/dL      Hematocrit 40 4 %      MCV 85 fL      MCH 28 3 pg      MCHC 33 2 g/dL      RDW 13 2 %      MPV 7 2 fL      Platelets 311 Thousands/uL      Neutrophils Relative 78 %      Lymphocytes Relative 10 %      Monocytes Relative 10 %      Eosinophils Relative 1 %      Basophils Relative 1 %      Neutrophils Absolute 6 80 Thousands/µL      Lymphocytes Absolute 0 90 Thousands/µL      Monocytes Absolute 0 90 Thousand/µL      Eosinophils Absolute 0 10 Thousand/µL      Basophils Absolute 0 10 Thousands/µL     UA w Reflex to Microscopic w Reflex to Culture [[id number]]     Lab Status: No result Specimen: Urine, Other                  CT renal stone study abdomen pelvis wo contrast   Final Result by  (04/20 2031)   Addendum 1 of 1 by Delmi Saravia MD (04/20 1828)   ADDENDUM:      Impression:      No urinary tract calculi  Final                 Procedures  ECG 12 Lead Documentation Only    Date/Time: 4/20/2021 1:46 PM  Performed by: Christina Urrutia PA-C  Authorized by: Christina Urrutia PA-C     Indications / Diagnosis:  Dizziness  Patient location:  ED  Interpretation:     Interpretation: normal    Rate:     ECG rate:  95    ECG rate assessment: normal    Rhythm:     Rhythm: sinus rhythm    Ectopy:     Ectopy: none    QRS:     QRS axis:  Normal  Conduction:     Conduction: normal    ST segments:     ST segments:  Normal  T waves:     T waves: normal    Comments:      qtc 404, no STEMI             ED Course  ED Course as of Apr 20 2031   Tue Apr 20, 2021   1615 Spoke with pt regarding need for admission for LILLY, will order ct renal stone study to r/o obstructive stone   Creatinine(!): 2 14   1619 Spoke with FP resident, will admit pending CT renal stone study        1717    FINDINGS:     RIGHT KIDNEY AND URETER:  No urinary tract calculi  No hydronephrosis or hydroureter  Stable simple left renal cyst measuring 2 6 cm      LEFT KIDNEY AND URETER:  No urinary tract calculi    No hydronephrosis or hydroureter      URINARY BLADDER:   Unremarkable      No significant abnormality in the visualized lung bases      Limited low radiation dose noncontrast CT evaluation demonstrates no clinically significant abnormality of liver, spleen, pancreas, or adrenal glands  No calcified gallstones or gallbladder wall thickening noted  No ascites or bulky lymphadenopathy on this limited noncontrast study  Bowel loops appear unremarkable  Limited evaluation demonstrates no evidence to suggest acute appendicitis  No acute fracture or destructive osseous lesion is identified         IMPRESSION:     Urinary tract calculi          1717 Called rad reading room for adendum of the impression of the Ct renal stone study      1803 Attempting to admit pt to [facility] residents, they want a redraw of his BMP to see if cr decreases, pt with dizziness and near syncope at 78 yo and almost double of Cr-he needs to be admitted, will have attendings speak to one another        32 [facility] residents came down to speak with the pt, still want repeat BMP prior to admitting pt, will redraw but pt should still be admitted, resident spoke with Dr brunson who advised admission as well                                SBIRT 22yo+      Most Recent Value   SBIRT (24 yo +)   In order to provide better care to our patients, we are screening all of our patients for alcohol and drug use  Would it be okay to ask you these screening questions? No Filed at: 04/20/2021 1857                    MDM  Number of Diagnoses or Management Options  LILLY (acute kidney injury) [facility]):   Dizziness:   Near syncope:   Diagnosis management comments:   67-year-old male initially presenting for evaluation after dizziness and near-syncope after receiving 1st covid vaccination this morning, on labs patient was found to LILLY with cr of 2 15, pt was bolused with fluid but repeat BMP showed elevated cr of 1 68 up from baseline, will admit, pt agreeable    Portions of the record may have been created with voice recognition software   Occasional wrong word or "sound a like" substitutions may have occurred due to the inherent limitations of voice recognition software  Read the chart carefully and recognize, using context, where substitutions have occurred  Disposition  Final diagnoses:   Dizziness   Near syncope   LILLY (acute kidney injury) ([facility] [address] )     Time reflects when diagnosis was documented in both MDM as applicable and the Disposition within this note     Time User Action Codes Description Comment    4/20/2021  8:14 PM Robert Bonnet Add [R42] Dizziness     4/20/2021  8:14 PM Robert Bonnet Add [R55] Near syncope     4/20/2021  8:14 PM Robert Bonnet Add [N17 9] LILLY (acute kidney injury) [facility])       ED Disposition     ED Disposition Condition Date/Time Comment    Admit Stable Tue Apr 20, 2021  8:14 PM Case was discussed with FP resident and the patient's admission status was agreed to be Admission Status: observation status to the service of Dr Harshil Cid   Follow-up Information    None         Patient's Medications   Discharge Prescriptions    No medications on file     No discharge procedures on file      PDMP Review     None          ED Provider  Electronically Signed by           Korina Greene PA-C  04/20/21 2031

## 2021-04-21 LAB
ANION GAP SERPL CALCULATED.3IONS-SCNC: 8 MMOL/L (ref 5–14)
BASOPHILS # BLD AUTO: 0 THOUSANDS/ΜL (ref 0–0.1)
BASOPHILS NFR BLD AUTO: 1 % (ref 0–1)
BUN SERPL-MCNC: 22 MG/DL (ref 5–25)
CALCIUM SERPL-MCNC: 8.9 MG/DL (ref 8.4–10.2)
CHLORIDE SERPL-SCNC: 106 MMOL/L (ref 97–108)
CO2 SERPL-SCNC: 23 MMOL/L (ref 22–30)
CREAT SERPL-MCNC: 1.39 MG/DL (ref 0.7–1.5)
EOSINOPHIL # BLD AUTO: 0.1 THOUSAND/ΜL (ref 0–0.4)
EOSINOPHIL NFR BLD AUTO: 2 % (ref 0–6)
ERYTHROCYTE [DISTWIDTH] IN BLOOD BY AUTOMATED COUNT: 13.1 %
FLUAV RNA RESP QL NAA+PROBE: NEGATIVE
FLUBV RNA RESP QL NAA+PROBE: NEGATIVE
GFR SERPL CREATININE-BSD FRML MDRD: 50 ML/MIN/1.73SQ M
GLUCOSE SERPL-MCNC: 90 MG/DL (ref 70–99)
HCT VFR BLD AUTO: 37.5 % (ref 41–53)
HGB BLD-MCNC: 12.3 G/DL (ref 13.5–17.5)
LYMPHOCYTES # BLD AUTO: 1.3 THOUSANDS/ΜL (ref 0.5–4)
LYMPHOCYTES NFR BLD AUTO: 21 % (ref 25–45)
MCH RBC QN AUTO: 28 PG (ref 26–34)
MCHC RBC AUTO-ENTMCNC: 32.8 G/DL (ref 31–36)
MCV RBC AUTO: 85 FL (ref 80–100)
MONOCYTES # BLD AUTO: 0.7 THOUSAND/ΜL (ref 0.2–0.9)
MONOCYTES NFR BLD AUTO: 11 % (ref 1–10)
NEUTROPHILS # BLD AUTO: 4.3 THOUSANDS/ΜL (ref 1.8–7.8)
NEUTS SEG NFR BLD AUTO: 66 % (ref 45–65)
PLATELET # BLD AUTO: 279 THOUSANDS/UL (ref 150–450)
PMV BLD AUTO: 7.4 FL (ref 8.9–12.7)
POTASSIUM SERPL-SCNC: 4.5 MMOL/L (ref 3.6–5)
RBC # BLD AUTO: 4.41 MILLION/UL (ref 4.5–5.9)
RSV RNA RESP QL NAA+PROBE: NEGATIVE
SARS-COV-2 RNA RESP QL NAA+PROBE: NEGATIVE
SODIUM SERPL-SCNC: 137 MMOL/L (ref 137–147)
WBC # BLD AUTO: 6.4 THOUSAND/UL (ref 4.5–11)

## 2021-04-21 PROCEDURE — 94664 DEMO&/EVAL PT USE INHALER: CPT

## 2021-04-21 PROCEDURE — 94060 EVALUATION OF WHEEZING: CPT

## 2021-04-21 PROCEDURE — 80048 BASIC METABOLIC PNL TOTAL CA: CPT | Performed by: STUDENT IN AN ORGANIZED HEALTH CARE EDUCATION/TRAINING PROGRAM

## 2021-04-21 PROCEDURE — 94760 N-INVAS EAR/PLS OXIMETRY 1: CPT

## 2021-04-21 PROCEDURE — 0241U HB NFCT DS VIR RESP RNA 4 TRGT: CPT | Performed by: FAMILY MEDICINE

## 2021-04-21 PROCEDURE — 94640 AIRWAY INHALATION TREATMENT: CPT

## 2021-04-21 PROCEDURE — 85025 COMPLETE CBC W/AUTO DIFF WBC: CPT | Performed by: STUDENT IN AN ORGANIZED HEALTH CARE EDUCATION/TRAINING PROGRAM

## 2021-04-21 PROCEDURE — 99225 PR SBSQ OBSERVATION CARE/DAY 25 MINUTES: CPT | Performed by: FAMILY MEDICINE

## 2021-04-21 RX ORDER — ERGOCALCIFEROL 1.25 MG/1
50000 CAPSULE ORAL WEEKLY
Status: DISCONTINUED | OUTPATIENT
Start: 2021-04-21 | End: 2021-04-22 | Stop reason: HOSPADM

## 2021-04-21 RX ORDER — LEVALBUTEROL 1.25 MG/.5ML
1.25 SOLUTION, CONCENTRATE RESPIRATORY (INHALATION) EVERY 8 HOURS PRN
Status: DISCONTINUED | OUTPATIENT
Start: 2021-04-21 | End: 2021-04-22 | Stop reason: HOSPADM

## 2021-04-21 RX ORDER — ALBUTEROL SULFATE 90 UG/1
2 AEROSOL, METERED RESPIRATORY (INHALATION) EVERY 6 HOURS PRN
Status: DISCONTINUED | OUTPATIENT
Start: 2021-04-21 | End: 2021-04-22 | Stop reason: HOSPADM

## 2021-04-21 RX ORDER — AMLODIPINE BESYLATE 5 MG/1
5 TABLET ORAL DAILY
Status: DISCONTINUED | OUTPATIENT
Start: 2021-04-21 | End: 2021-04-22 | Stop reason: HOSPADM

## 2021-04-21 RX ORDER — SODIUM CHLORIDE 9 MG/ML
125 INJECTION, SOLUTION INTRAVENOUS CONTINUOUS
Status: DISCONTINUED | OUTPATIENT
Start: 2021-04-21 | End: 2021-04-21

## 2021-04-21 RX ORDER — SODIUM CHLORIDE FOR INHALATION 0.9 %
VIAL, NEBULIZER (ML) INHALATION
Status: COMPLETED
Start: 2021-04-21 | End: 2021-04-21

## 2021-04-21 RX ADMIN — ISODIUM CHLORIDE 3 ML: 0.03 SOLUTION RESPIRATORY (INHALATION) at 14:52

## 2021-04-21 RX ADMIN — AMLODIPINE BESYLATE 5 MG: 5 TABLET ORAL at 08:01

## 2021-04-21 RX ADMIN — SODIUM CHLORIDE 125 ML/HR: 0.9 INJECTION, SOLUTION INTRAVENOUS at 16:09

## 2021-04-21 RX ADMIN — TIOTROPIUM BROMIDE 18 MCG: 18 CAPSULE ORAL; RESPIRATORY (INHALATION) at 11:04

## 2021-04-21 RX ADMIN — SODIUM CHLORIDE 125 ML/HR: 0.9 INJECTION, SOLUTION INTRAVENOUS at 00:16

## 2021-04-21 RX ADMIN — ALBUTEROL SULFATE 2 PUFF: 90 AEROSOL, METERED RESPIRATORY (INHALATION) at 06:13

## 2021-04-21 RX ADMIN — ERGOCALCIFEROL 50000 UNITS: 1.25 CAPSULE ORAL at 08:02

## 2021-04-21 RX ADMIN — LEVALBUTEROL HYDROCHLORIDE 1.25 MG: 1.25 SOLUTION, CONCENTRATE RESPIRATORY (INHALATION) at 14:52

## 2021-04-21 RX ADMIN — SODIUM CHLORIDE 125 ML/HR: 0.9 INJECTION, SOLUTION INTRAVENOUS at 08:03

## 2021-04-21 NOTE — PROCEDURES
Pt had a bedside PFT with Bronchodilator without any problem  Pt data will be uploaded to the computer  Pt had xopenex as a bronchodilator    It was ordered by family practice

## 2021-04-21 NOTE — H&P
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   · Most likely 2/2 dehydration  · History of HTN, CKD,HLD  · Presented to ED with complaints of  Lightheadedness  · ED course:1L NS,  baseline Cr-1 2-->1 4 Cr 2 14-->1 68 after 1L NS   Urinalysis not collected, CT Abd/Pelvis negative  · Exam unremarkable    Plan  · Continue with mIVF  · Hold ACEI as dehydration most likely ensued due to gastrointestinal losses resulting in LILLY failing to autoregulate  · Strict I/O''s  · CBC, BMP, AM   · Judicious use of nephrotoxic drugs      Postural dizziness with presyncope  Assessment & Plan  Assessment   · No reported symptoms of seizures, confusion or disorientation   · EKG showed NSR with normal axis, normal MA and QTc intervals, no ST-T wave changes   · ECHO from about 2 yrs ago reviewed and showed Normal left ventricular systolic function, EF 03%  · Troponin negative,no electrolyte abnormalities   · Unclear of the etiology, likely secondary to dehydration, vaccination but unlikely to be cardiac, orthostatic or neurogenic in nature     Plan  · Orthostatic Vital Signs   · Continue Telemetry  · Maintain hydration with maintenance IVF @ 125 cc/hr  · CBC, BMP  · Cardiac Diet    Essential hypertension  Assessment & Plan  Assessment  · BP of 141/65; lefty goal per JNC-8  · EKG showed NSR with normal axis, normal MA, QTc, no significant ST-T wave changes   · Home Medications:   Amlodipine 5 mg q d , lisinopril 40 mg q h s      Plan  ·  Hold lisinopril 40 mg tonight, continue with amlodipine 5 mg        COPD (chronic obstructive pulmonary disease) [facility])  Assessment & Plan  Assessment  · Chronic, stable  · Spirometry 2019-Moderate airflow limitation  No significant improvement in airflow or vital capacity following the administration of bronchodilators    Plan  · Continue Home medication of Ventolin q6 prn    VTE Prophylaxis: Not indicated  Code Status: No Order  Anticipated Length of Stay:  Patient will be admitted on an Observation basis with an anticipated length of stay of  less than 2 midnights  Justification for Hospital Stay: Acute on Chronic Kidney Injury   Total Time for Visit, including Counseling / Coordination of Care: 60 mins  Greater than 50% of this total time spent on direct patient counseling and coordination of care  Chief Complaint:     Chief Complaint   Patient presents with    Dizziness     states that he began to get dizzy about 10-15 min after vaccine  staets he does not feel dizzy now  pt denies hitting head     History of Present Illness:    Zane Mari  is a 77 y o  male with a significant PMHx of CKD Stage 2/3A, HTN,HLD, COPD, Hearing Loss  presented to [facility] today after Receiving his 1st COVID vaccination earlier today  Patient had no symptoms after shot in the 15 minutes waiting window  On his way to the hospital lobby exit patient suddenly felt lightheaded and had to sit along the wall, eventually on the floor  Patient did not pass out, nor hit his head and denied any chest pain, shortness of breath, vision changes, paresthesias, fever, chills, nausea, vomiting, neck pain, tinnitus, headaches  Patient states this has not happened in the past   Denies any sick contacts, states that this has not happened before and was adament about not passing out      ED Significant Findings:   Vitals-HR-85, RR-16, BP-141/65, 98%O2  PE-hard of hearing, no abnormal findings  Labs-CBC-Hgb 13 4, CMP-Cr-->2 14-->1 68,Mg-1 9, Phos-3 1 Glucose-110, GFR-29, Troponin-negative, UA-not collected  EKG/Qsicycu-[id number]-[id number] CT-Negative for urinary tract calculi in left or right kidney  Meds - IVF-1L NS     Patient was admitted to [facility] Medicine service for observation for LILLY with fluid hydration  Currently patient is resting comfortably in bed  He denies any fever, chills, rash, headaches, lightheadedness, dizziness, vision changes, cough, shortness of breath, chest pain, palpitations, N/V/D, abdominal pain, urinary symptoms, leg pain, leg swelling  Patient denies any falls, trauma or head injury  Patient denies any suicidal or homicidal ideation or hallucinations  Review of Systems:  Review of Systems   Constitutional: Negative for chills, fatigue, fever and unexpected weight change  HENT: Negative for congestion, rhinorrhea, sinus pressure and sore throat  Eyes: Negative for visual disturbance  Respiratory: Negative for cough, chest tightness, shortness of breath and wheezing  Cardiovascular: Negative for chest pain  Gastrointestinal: Negative for abdominal distention, abdominal pain, blood in stool, constipation, diarrhea, nausea and vomiting  Genitourinary: Negative for difficulty urinating, dysuria, frequency, hematuria and urgency  Musculoskeletal: Negative for back pain and neck pain  Skin: Negative for rash  Allergic/Immunologic: Negative  Neurological: Positive for light-headedness  Negative for dizziness, weakness, numbness and headaches  Psychiatric/Behavioral: Negative for behavioral problems  Past Medical and Surgical History:   Past Medical History:   Diagnosis Date    CKD (chronic kidney disease)     Hypertension     Microalbuminuria      Past Surgical History:   Procedure Laterality Date    APPENDECTOMY      HERNIA REPAIR      STRABISMUS SURGERY       Meds/Allergies: Allergies: No Known Allergies  Prior to Admission Medications   Prescriptions Last Dose Informant Patient Reported? Taking?    albuterol (Ventolin HFA) 90 mcg/act inhaler 4/20/2021 at Unknown time  No Yes   Sig: Inhale 2 puffs every 6 (six) hours as needed for wheezing or shortness of breath (15 minutes before exercise)   amLODIPine (NORVASC) 5 mg tablet 4/20/2021 at Unknown time  No Yes   Sig: Take 1 tablet (5 mg total) by mouth daily   ergocalciferol (ERGOCALCIFEROL) 1 25 MG (32792 UT) capsule 4/20/2021 at Unknown time  No Yes   Sig: Take 1 capsule (50,000 Units total) by mouth once a week   lisinopril (ZESTRIL) 40 mg tablet 4/19/2021 at Unknown time  No Yes   Sig: Take 1 tablet (40 mg total) by mouth every evening   umeclidinium-vilanterol (Anoro Ellipta) 62 5-25 MCG/INH inhaler Not Taking at Unknown time  No No   Sig: Inhale 1 puff daily   Patient not taking: Reported on 9/2/2020      Facility-Administered Medications: None     Social History:     Social History     Socioeconomic History    Marital status: Single     Spouse name: Not on file    Number of children: Not on file    Years of education: Not on file    Highest education level: Not on file   Occupational History    Not on file   Social Needs    Financial resource strain: Not on file    Food insecurity     Worry: Not on file     Inability: Not on file    Transportation needs     Medical: Not on file     Non-medical: Not on file   Tobacco Use    Smoking status: Former Smoker     Types: Cigarettes     Start date: 12/18/2018    Smokeless tobacco: Never Used   Substance and Sexual Activity    Alcohol use: Yes     Frequency: Monthly or less     Drinks per session: 1 or 2     Binge frequency: Never     Comment: Rarely; once a year    Drug use: Never    Sexual activity: Not on file   Lifestyle    Physical activity     Days per week: Not on file     Minutes per session: Not on file    Stress: Not on file   Relationships    Social connections     Talks on phone: Not on file     Gets together: Not on file     Attends Jainism service: Not on file     Active member of club or organization: Not on file     Attends meetings of clubs or organizations: Not on file Relationship status: Not on file    Intimate partner violence     Fear of current or ex partner: Not on file     Emotionally abused: Not on file     Physically abused: Not on file     Forced sexual activity: Not on file   Other Topics Concern    Not on file   Social History Narrative    Not on file     Family History:  Family History   Problem Relation Age of Onset    No Known Problems Mother     No Known Problems Father        Physical Exam:   Vitals:   Blood Pressure: 141/65 (04/20/21 1917)  Pulse: 83 (04/20/21 1917)  Temperature: 97 9 °F (36 6 °C) (04/20/21 1703)  Temp Source: Tympanic (04/20/21 1703)  Respirations: 18 (04/20/21 1917)  Weight - Scale: 83 3 kg (183 lb 10 3 oz) (04/20/21 1332)  SpO2: 98 % (04/20/21 1917)    Physical Exam  Vitals signs and nursing note reviewed  Constitutional:       General: He is not in acute distress  Appearance: He is well-developed  He is not toxic-appearing  HENT:      Head: Normocephalic and atraumatic  Ears:      Comments: Hard of hearing bilaterally     Mouth/Throat:      Mouth: Mucous membranes are moist       Comments: Poor dentition  Eyes:      Extraocular Movements: Extraocular movements intact  Pupils: Pupils are equal, round, and reactive to light  Neck:      Musculoskeletal: Normal range of motion and neck supple  Cardiovascular:      Rate and Rhythm: Normal rate and regular rhythm  Heart sounds: Normal heart sounds  No murmur  Pulmonary:      Effort: Pulmonary effort is normal  No respiratory distress  Breath sounds: Normal breath sounds  No wheezing, rhonchi or rales  Comments: Very mild right lower expiratory wheezing   Abdominal:      General: Bowel sounds are normal  There is no distension  Palpations: Abdomen is soft  Tenderness: There is no abdominal tenderness  There is no guarding  Musculoskeletal: Normal range of motion  General: No swelling or deformity     Skin:     General: Skin is warm and dry       Coloration: Skin is not jaundiced or pale  Neurological:      General: No focal deficit present  Mental Status: He is alert and oriented to person, place, and time  Mental status is at baseline  Cranial Nerves: No cranial nerve deficit  Sensory: No sensory deficit  Motor: No weakness  Coordination: Coordination normal    Psychiatric:         Behavior: Behavior normal        Lab Results: I have personally reviewed pertinent reports  Results from last 7 days   Lab Units 04/20/21  1542   WBC Thousand/uL 8 70   HEMOGLOBIN g/dL 13 4*   HEMATOCRIT % 40 4*   PLATELETS Thousands/uL 311   NEUTROS PCT % 78*   LYMPHS PCT % 10*   MONOS PCT % 10   EOS PCT % 1     Results from last 7 days   Lab Units 04/20/21  1926 04/20/21  1542   POTASSIUM mmol/L 4 2 4 7   CHLORIDE mmol/L 106 100   CO2 mmol/L 26 29   BUN mg/dL 25 27*   CREATININE mg/dL 1 68* 2 14*   CALCIUM mg/dL 8 5 9 7   ALK PHOS U/L  --  99   ALT U/L  --  31   AST U/L  --  25   EGFR ml/min/1 73sq m 39* 29*   MAGNESIUM mg/dL  --  1 9   PHOSPHORUS mg/dL  --  3 1         Results from last 7 days   Lab Units 04/20/21  1542   TROPONIN I ng/mL <0 01                        Invalid input(s): URIBILINOGEN          Imaging: I have personally reviewed pertinent reports  Ct Renal Stone Study Abdomen Pelvis Wo Contrast    Addendum Date: 4/20/2021 Addendum:   ADDENDUM: Impression: No urinary tract calculi  Result Date: 4/20/2021  Narrative: CT ABDOMEN AND PELVIS WITHOUT IV CONTRAST - LOW DOSE RENAL STONE INDICATION:   dianna  COMPARISON:  Ultrasound dated 8/12/2020 TECHNIQUE:  Low dose thin section CT examination of the abdomen and pelvis was performed without intravenous or oral contrast according to a protocol specifically designed to evaluate for urinary tract calculus  Axial, sagittal, and coronal 2D reformatted images were created from the source data and submitted for interpretation     Evaluation for pathology in the abdomen and pelvis that is unrelated to urinary tract calculi is limited  Radiation dose length product (DLP) for this visit:  216 mGy-cm   This examination, like all CT scans performed in the [facility], was performed utilizing techniques to minimize radiation dose exposure, including the use of iterative reconstruction and automated exposure control  FINDINGS: RIGHT KIDNEY AND URETER: No urinary tract calculi  No hydronephrosis or hydroureter  Stable simple left renal cyst measuring 2 6 cm  LEFT KIDNEY AND URETER: No urinary tract calculi  No hydronephrosis or hydroureter  URINARY BLADDER: Unremarkable  No significant abnormality in the visualized lung bases  Limited low radiation dose noncontrast CT evaluation demonstrates no clinically significant abnormality of liver, spleen, pancreas, or adrenal glands  No calcified gallstones or gallbladder wall thickening noted  No ascites or bulky lymphadenopathy on this limited noncontrast study  Bowel loops appear unremarkable  Limited evaluation demonstrates no evidence to suggest acute appendicitis  No acute fracture or destructive osseous lesion is identified  Impression: Urinary tract calculi    Workstation performed: [id number]     EKG, Pathology, and Other Studies Reviewed on Admission:   EKG  Result Date: 04/20/21  Impression:  NSR, QTc 404, no significant ST-T wave changes    Entire H&P was discussed with Dr Myranda Buenrostro who agreed to what is noted above    Ghada Seals MD  04/20/21  9:31 PM

## 2021-04-21 NOTE — PHYSICIAN ADVISOR
Current patient class: Observation  The patient is currently on Hospital Day: 2 at [facility] [address]        The patient was admitted to the hospital  on N/A at N/A for the following diagnosis:  Dizziness [R42]  LILLY (acute kidney injury) ([facility] [address] ) [N17 9]  Near syncope [R55]     After review of the relevant documentation, labs, vital signs and test results, the patient is most appropriate for OBSERVATION CLASS  Rationale is as follows: The patient presented to the hospital yesterday and was placed under observation class  This morning for about 2 hours he had tachypnea and increased work of breathing was noted but he was not hypoxic  It seems that this resolved  He was evaluated early this morning by the residency team   The original patient complaint was mainly postural dizziness with presyncope  He had acute kidney injury which has returned to baseline  He underwent spirometry this afternoon  It is difficult to determine how symptomatic the patient is or the anticipated length of stay  I recommend maintaining observation class tonight  If the patient has continued issues with breathing or requires further hospitalization making him unstable for discharge on April 22nd, then I would change him to inpatient class      The patients vitals on arrival were   ED Triage Vitals   Temperature Pulse Respirations Blood Pressure SpO2   04/20/21 1332 04/20/21 1332 04/20/21 1332 04/20/21 1332 04/20/21 1332   97 5 °F (36 4 °C) 102 20 148/73 94 %      Temp Source Heart Rate Source Patient Position - Orthostatic VS BP Location FiO2 (%)   04/20/21 1703 04/20/21 1332 04/20/21 1703 04/20/21 1703 --   Tympanic Monitor Lying Left arm       Pain Score       04/20/21 1917       No Pain           Past Medical History:   Diagnosis Date    CKD (chronic kidney disease)     Hypertension     Microalbuminuria      Past Surgical History:   Procedure Laterality Date    APPENDECTOMY      BEDSIDE SPIROMETRY WITH BRONCHODILATOR PRE/POST  4/21/2021    HERNIA REPAIR      STRABISMUS SURGERY             Consults have been placed to:   None    Vitals:    04/21/21 0905 04/21/21 0910 04/21/21 1300 04/21/21 1508   BP: 159/75 (!) 176/77  131/71   BP Location: Left arm Left arm  Left arm   Pulse: (!) 114  (!) 110 67   Resp:   22 20   Temp:    98 1 °F (36 7 °C)   TempSrc:    Temporal   SpO2:   93% 99%   Weight:       Height:           Most recent labs:    Recent Labs     04/20/21  1542  04/21/21  0543   WBC 8 70  --  6 40   HGB 13 4*  --  12 3*   HCT 40 4*  --  37 5*     --  279   K 4 7   < > 4 5   CALCIUM 9 7   < > 8 9   BUN 27*   < > 22   CREATININE 2 14*   < > 1 39   TROPONINI <0 01  --   --    AST 25  --   --    ALT 31  --   --    ALKPHOS 99  --   --     < > = values in this interval not displayed         Scheduled Meds:  Current Facility-Administered Medications   Medication Dose Route Frequency Provider Last Rate    albuterol  2 puff Inhalation Q6H PRN Dwayne Pleitez MD      amLODIPine  5 mg Oral Daily Dwayne Pleitez MD      ergocalciferol  50,000 Units Oral Weekly Dwayne Pleitez MD      levalbuterol  1 25 mg Nebulization Q8H PRN Billy Mireles MD      sodium chloride  125 mL/hr Intravenous Continuous Dwayne Pleitez  mL/hr (04/21/21 1609)    tiotropium  18 mcg Inhalation Daily Billy Mireles MD       Continuous Infusions:sodium chloride, 125 mL/hr, Last Rate: 125 mL/hr (04/21/21 1609)      PRN Meds:   albuterol    levalbuterol

## 2021-04-21 NOTE — ASSESSMENT & PLAN NOTE
Assessment  COPD is chronic; however, patient has not had any formal PFTs  Spirometry in 2019 showed moderate airflow limitation  No significant improvement in airflow or vital capacity occurred following the administration of bronchodilators  Patient is more short of breath today compared to previous day and only uses Albuterol PRN that he has been using daily  During hospitalization, he was placed on Spiriva (tiotropium) 18 mcg daily which improved his shortness of breath and wheezing  At discharge, the Spiriva was discontinued and Molly Rein was added for COPD management  COVID test is negative on 4/21/21       Plan  - Continue Home medication of Ventolin q6h prn as rescue inhaler   - Added Bevespi Aerosphere (glycopyrrolate-formoterol) 9-4 8 mcg/act inhaler two puffs bid at discharge for COPD management

## 2021-04-21 NOTE — ASSESSMENT & PLAN NOTE
Assessment  Blood pressure is stable during hospitalization and lefty goal per JNC-8  EKG showed NSR with normal axis, normal NJ, QTc, no significant ST-T wave changes  Home Medications include Amlodipine 5 mg q d  and lisinopril 40 mg q h s  Lisinopril 40 mg was held during hospitalization      Plan  - Restart lisinopril 40 mg at discharge  - Continue with amlodipine 5 mg

## 2021-04-21 NOTE — DISCHARGE SUMMARY
Discharge Summary - Ulises Colón    Patient Information: Klaudia Sexton  77 y o  male MRN: [id number]  Unit/Bed#: 7T [facility] 708-01 Encounter: [id number]    Discharging Physician / Practitioner: Dr Breanne Medrano  PCP: Emeli Ware MD  Admission Date: 04/20/2021  Admission Orders (From admission, onward)     Ordered        04/20/21 2015  Place in Observation  Once                   Discharge Date: 04/22/2021    Reason for Admission: LILLY    Acute kidney injury superimposed on chronic kidney disease ([facility] [address] )  Assessment  Patient has a medical history of CKD Stage 2/3, HTN, CKD, and HLD  He follows outpatient with Nephrology  He presented to the ED with complaints of lightheadedness and was found to have an LILLY due to a Cr of 2 14, likely due to dehydration  His baseline Cr ranges from 1 2 to 1 4  After receiving 1L bolus NS at the ED and IVF during hospitalization, the patient's Cr corrected to 1 39 and has been stable  Plan  - Restart ACEI at home dose as LILLY was likely due to dehydration has resolved with IVF  - Avoid nephrotoxic agents    Postural dizziness with presyncope  Assessment   Likely due to dehydration, patient is asymptomatic at this time and has not reported presyncopal symptoms since receiving IVF  Less likely neurogenic, patient has no reported symptoms of seizures, confusion or disorientation  Less likely cardiac etiology as EKG showed NSR with normal axis, normal DE and QTc intervals, and no ST-T wave changes  Also, ECHO from about 2 yrs ago was  reviewed and showed Normal left ventricular systolic function with EF 61%  Also, troponin was negative, and there were no electrolyte abnormalities  Less likely orthostatic as orthostatic testing was negative on 4/21/21      Plan   - Regular oral hydration to prevent recurrence  - Follow up with PCP if similar episode or concerns arise    Essential hypertension  Assessment  Blood pressure is stable during hospitalization and lefty goal per JNC-8  EKG showed NSR with normal axis, normal DE, QTc, no significant ST-T wave changes  Home Medications include Amlodipine 5 mg q d  and lisinopril 40 mg q h s  Lisinopril 40 mg was held during hospitalization  Plan  - Restart lisinopril 40 mg at discharge  - Continue with amlodipine 5 mg    COPD (chronic obstructive pulmonary disease) ([facility])  Assessment  COPD is chronic; however, patient has not had any formal PFTs  Spirometry in 2019 showed moderate airflow limitation  No significant improvement in airflow or vital capacity occurred following the administration of bronchodilators  Patient is more short of breath today compared to previous day and only uses Albuterol PRN that he has been using daily  During hospitalization, he was placed on Spiriva (tiotropium) 18 mcg daily which improved his shortness of breath and wheezing  At discharge, the Spiriva was discontinued and Shirlean Katayama was added for COPD management  COVID test is negative on 4/21/21       Plan  - Continue Home medication of Ventolin q6h prn as rescue inhaler   - Added Bevespi Aerosphere (glycopyrrolate-formoterol) 9-4 8 mcg/act inhaler two puffs bid at discharge for COPD management    Discharge Diagnoses:     Principal Problem (Resolved):    Acute kidney injury superimposed on chronic kidney disease ([facility] [address] )  Active Problems:    [facility]    Essential hypertension  Resolved Problems:    Postural dizziness with presyncope    Consultations During Hospital Stay:  · Respiratory Therapy    Procedures Performed:   · EKG 04/20/2021: normal sinus rhythm  · CT renal stone study abdomen pelvis wo contrast 04/20/2021: no significant abnormality  · Bedside spirometry    Significant Findings / Test Results:   · Cr: 2 14 at admission, 1 68 after 1L NS bolus, 1 39 most recent  · Positive orthostatics at admission, repeat was negative    Incidental Findings:   · none    Test Results Pending at Discharge (will require follow up):   · none     Outpatient Tests Requested:  · PFTs    Outpatient follow-up Requested:   PCP    Complications:  none    Hospital Course:   Berny Leon  is a 77 y o  male with a significant PMHx of CKD Stage 2/3A, HTN,HLD, COPD, Hearing Loss  presented to [facility] today after Receiving his 1st COVID vaccination earlier today  Patient had no symptoms after shot in the 15 minutes waiting window  On his way to the hospital lobby exit patient suddenly felt lightheaded and had to sit along the wall, eventually on the floor  Patient did not pass out, nor hit his head and denied any chest pain, shortness of breath, vision changes, paresthesias, fever, chills, nausea, vomiting, neck pain, tinnitus, headaches  Patient states this has not happened in the past   Denies any sick contacts, states that this has not happened before and was adament about not passing out      ED Significant Findings:   Vitals-HR-85, RR-16, BP-141/65, 98%O2  PE-hard of hearing, no abnormal findings  Labs-CBC-Hgb 13 4, CMP-Cr-->2 14-->1 68,Mg-1 9, Phos-3 1 Glucose-110, GFR-29, Troponin-negative, UA-not collected  EKG/[id number] CT-Negative for urinary tract calculi in left or right kidney  Meds - IVF-1L NS      Patient was admitted to [facility] Medicine service for observation for LILLY with fluid hydration      On day one of admission, the patient appeared to have increased work of breathing ("belly breathing") but was otherwise resting comfortably in bed  He denies any fever, chills, rash, headaches, lightheadedness, dizziness, vision changes, cough, shortness of breath, chest pain, palpitations, N/V/D, abdominal pain, urinary symptoms, leg pain, leg swelling  Patient denies any falls, trauma or head injury  Patient denies any suicidal or homicidal ideation or hallucinations  Condition at Discharge: fair     Discharge Day Visit / Exam:   Overnight, the patient did well after being taken off maintenance fluids   He was on room air saturating at 93% with stable vital signs  Per nursing, the patient did well overnight and does not have any complaints at this time  Today, the patient was seen and examined  Patient appears to be respirating well without distress on room air  Patient reports feeling well at this time and states that he does not have shortness of breath  He endorses eating well and states that he would like to go home  The patient denies fever, chills, rash, headaches, chest pain, diarrhea, nausea, dizziness, and fatigue  Vitals: Blood Pressure: (!) 186/70 (04/22/21 0840) 155/70 (04/22/21 1300)  Pulse: 72 (04/21/21 2331)  Temperature: 99 1 °F (37 3 °C) (04/21/21 2331)  Temp Source: Temporal (04/21/21 2331)  Respirations: 20 (04/21/21 2331)  Height: 5' 9" (175 3 cm) (04/21/21 0146)  Weight - Scale: 83 3 kg (183 lb 10 3 oz) (04/20/21 1332)  SpO2: 93 % (04/21/21 2331)    Exam:   Physical Exam  Vitals signs and nursing note reviewed  Constitutional:       General: He is not in acute distress  Appearance: Normal appearance  He is not ill-appearing, toxic-appearing or diaphoretic  HENT:      Head: Normocephalic and atraumatic  Right Ear: External ear normal  There is no impacted cerumen  Left Ear: External ear normal  There is no impacted cerumen  Nose: Nose normal       Mouth/Throat:      Mouth: Mucous membranes are moist       Pharynx: Oropharynx is clear  No oropharyngeal exudate or posterior oropharyngeal erythema  Eyes:      General: No scleral icterus  Extraocular Movements: Extraocular movements intact  Conjunctiva/sclera: Conjunctivae normal    Neck:      Musculoskeletal: Neck supple  Cardiovascular:      Rate and Rhythm: Normal rate and regular rhythm  Pulses: Normal pulses  Heart sounds: No murmur  Pulmonary:      Effort: Pulmonary effort is normal       Breath sounds: Wheezing (mild expiratory, decreased compared to yesterday) present     Abdominal:      General: Bowel sounds are normal       Palpations: Abdomen is soft  Musculoskeletal:         General: No swelling  Skin:     General: Skin is warm and dry  Capillary Refill: Capillary refill takes less than 2 seconds  Coloration: Skin is not jaundiced  Neurological:      General: No focal deficit present  Mental Status: He is alert and oriented to person, place, and time  Psychiatric:         Mood and Affect: Mood normal          Behavior: Behavior normal          Thought Content: Thought content normal          Judgment: Judgment normal      Discussion with Family: none    Discharge instructions/Information to patient and family:   See after visit summary for information provided to patient and family  Discharge Medications:  Albuterol Sulfate 90 mcg/act 2 puffs Inhalation Every 6 hours PRN  amLODIPine Besylate 5 mg Oral Daily  Ergocalciferol 50,000 Units Oral Weekly  Glycopyrrolate-Formoterol 9-4 8 MCG/ACT 2 puffs Inhalation 2 times daily  Lisinopril 40 mg Oral Every evening  Umeclidinium-Vilanterol 62 5-25 MCG/INH 1 puff Inhalation Daily    Provisions for Follow-Up Care:  See after visit summary for information related to follow-up care and any pertinent home health orders  Disposition:     Home    For Discharges to [facility] SNF:   · Not Applicable to this Patient - Not Applicable to this Patient    Planned Readmission: none     Discharge Statement:  I spent 45 minutes discharging the patient  This time was spent on the day of discharge  I had direct contact with the patient on the day of discharge  Greater than 50% of the total time was spent examining patient, answering all patient questions, arranging and discussing plan of care with patient as well as directly providing post-discharge instructions  Additional time then spent on discharge activities      ** Please Note: This note has been constructed using a voice recognition system **    Melo Desai  04/22/21  4:18 PM

## 2021-04-21 NOTE — PLAN OF CARE
Problem: Potential for Falls  Goal: Patient will remain free of falls  Description: INTERVENTIONS:  - Assess patient frequently for physical needs  -  Identify cognitive and physical deficits and behaviors that affect risk of falls    -  [place] fall precautions as indicated by assessment   - Educate patient/family on patient safety including physical limitations  - Instruct patient to call for assistance with activity based on assessment  - Modify environment to reduce risk of injury  - Consider OT/PT consult to assist with strengthening/mobility  Outcome: Progressing     Problem: PAIN - ADULT  Goal: Verbalizes/displays adequate comfort level or baseline comfort level  Description: Interventions:  - Encourage patient to monitor pain and request assistance  - Assess pain using appropriate pain scale  - Administer analgesics based on type and severity of pain and evaluate response  - Implement non-pharmacological measures as appropriate and evaluate response  - Consider cultural and social influences on pain and pain management  - Notify physician/advanced practitioner if interventions unsuccessful or patient reports new pain  Outcome: Progressing     Problem: INFECTION - ADULT  Goal: Absence or prevention of progression during hospitalization  Description: INTERVENTIONS:  - Assess and monitor for signs and symptoms of infection  - Monitor lab/diagnostic results  - Monitor all insertion sites, i e  indwelling lines, tubes, and drains  - Monitor endotracheal if appropriate and nasal secretions for changes in amount and color  - [place] appropriate cooling/warming therapies per order  - Administer medications as ordered  - Instruct and encourage patient and family to use good hand hygiene technique  - Identify and instruct in appropriate isolation precautions for identified infection/condition  Outcome: Progressing  Goal: Absence of fever/infection during neutropenic period  Description: INTERVENTIONS:  - Monitor WBC    Outcome: Progressing     Problem: SAFETY ADULT  Goal: Patient will remain free of falls  Description: INTERVENTIONS:  - Assess patient frequently for physical needs  -  Identify cognitive and physical deficits and behaviors that affect risk of falls    -  [place] fall precautions as indicated by assessment   - Educate patient/family on patient safety including physical limitations  - Instruct patient to call for assistance with activity based on assessment  - Modify environment to reduce risk of injury  - Consider OT/PT consult to assist with strengthening/mobility  Outcome: Progressing  Goal: Maintain or return to baseline ADL function  Description: INTERVENTIONS:  -  Assess patient's ability to carry out ADLs; assess patient's baseline for ADL function and identify physical deficits which impact ability to perform ADLs (bathing, care of mouth/teeth, toileting, grooming, dressing, etc )  - Assess/evaluate cause of self-care deficits   - Assess range of motion  - Assess patient's mobility; develop plan if impaired  - Assess patient's need for assistive devices and provide as appropriate  - Encourage maximum independence but intervene and supervise when necessary  - Involve family in performance of ADLs  - Assess for home care needs following discharge   - Consider OT consult to assist with ADL evaluation and planning for discharge  - Provide patient education as appropriate  Outcome: Progressing  Goal: Maintain or return mobility status to optimal level  Description: INTERVENTIONS:  - Assess patient's baseline mobility status (ambulation, transfers, stairs, etc )    - Identify cognitive and physical deficits and behaviors that affect mobility  - Identify mobility aids required to assist with transfers and/or ambulation (gait belt, sit-to-stand, lift, walker, cane, etc )  - [place] fall precautions as indicated by assessment  - Record patient progress and toleration of activity level on Mobility SBAR; progress patient to next Phase/Stage  - Instruct patient to call for assistance with activity based on assessment  - Consider rehabilitation consult to assist with strengthening/weightbearing, etc   Outcome: Progressing     Problem: DISCHARGE PLANNING  Goal: Discharge to home or other facility with appropriate resources  Description: INTERVENTIONS:  - Identify barriers to discharge w/patient and caregiver  - Arrange for needed discharge resources and transportation as appropriate  - Identify discharge learning needs (meds, wound care, etc )  - Arrange for interpretive services to assist at discharge as needed  - Refer to Case Management Department for coordinating discharge planning if the patient needs post-hospital services based on physician/advanced practitioner order or complex needs related to functional status, cognitive ability, or social support system  Outcome: Progressing     Problem: Knowledge Deficit  Goal: Patient/family/caregiver demonstrates understanding of disease process, treatment plan, medications, and discharge instructions  Description: Complete learning assessment and assess knowledge base    Interventions:  - Provide teaching at level of understanding  - Provide teaching via preferred learning methods  Outcome: Progressing

## 2021-04-21 NOTE — ASSESSMENT & PLAN NOTE
Assessment   Likely due to dehydration, patient is asymptomatic at this time and has not reported presyncopal symptoms since receiving IVF  Less likely neurogenic, patient has no reported symptoms of seizures, confusion or disorientation  Less likely cardiac etiology as EKG showed NSR with normal axis, normal GA and QTc intervals, and no ST-T wave changes  Also, ECHO from about 2 yrs ago was  reviewed and showed Normal left ventricular systolic function with EF 61%  Also, troponin was negative, and there were no electrolyte abnormalities  Less likely orthostatic as orthostatic testing was negative on 4/21/21      Plan   - Regular oral hydration to prevent recurrence  - Follow up with PCP if similar episode or concerns arise

## 2021-04-21 NOTE — ASSESSMENT & PLAN NOTE
Assessment  Patient has a medical history of CKD Stage 2/3, HTN, CKD, and HLD  He follows outpatient with Nephrology  He presented to the ED with complaints of lightheadedness and was found to have an LILLY due to a Cr of 2 14, likely due to dehydration  His baseline Cr ranges from 1 2 to 1 4  After receiving 1L bolus NS at the ED and IVF during hospitalization, the patient's Cr corrected to 1 39 and has been stable      Plan  - Restart ACEI at home dose as LILLY was likely due to dehydration has resolved with IVF  - Avoid nephrotoxic agents

## 2021-04-21 NOTE — NURSING NOTE
Patient feet in need of podiatric care  Toe nails are dark and growing around the toe  Skin is intact  Refused other care to the feet at this time

## 2021-04-21 NOTE — UTILIZATION REVIEW
Initial Clinical Review    Admission: Date/Time/Statement:   Admission Orders (From admission, onward)     Ordered        04/20/21 2015  Place in Observation  Once                   Orders Placed This Encounter   Procedures    Place in Observation     Standing Status:   Standing     Number of Occurrences:   1     Order Specific Question:   Level of Care     Answer:   Med Surg [16]     Order Specific Question:   Bed request comments     Answer:   tele     ED Arrival Information     Expected Arrival [address] M[address] Wills of Arrival Escorted By Service Admission Type    - 4/20/2021 13:17 Urgent Wheelchair Self General Medicine Urgent    Arrival Complaint    1st covid-19 vaccination and didn't feel well        Chief Complaint   Patient presents with    Dizziness     states that he began to get dizzy about 10-15 min after vaccine  staets he does not feel dizzy now  pt denies hitting head       Initial Presentation: 77 y o  male with a significant PMHx of CKD Stage 2/3A, HTN,HLD, COPD, Hearing Loss  presented to ED for evaluation after dizziness and near-syncope after receiving 1st covid vaccination this morning  Patient had no symptoms after shot in the 15 minutes waiting window but on his way out he developed symptoms and had to sit on ground  Patient did not pass out, nor hit his head and denied any chest pain, shortness of breath, vision changes, paresthesias, fever, chills, nausea, vomiting, neck pain, tinnitus, headaches  Initial cr 2 14,  Repeat Cr 1 68  EKG-NSR   CT-Negative for urinary tract calculi in left or right kidney    Date: 4/20 Admitted to Observation with LILLY on CKD likely 2/2 dehydration and Presyncope and Plan is for  IVF's, hold Lisinopril, Telemetry, orthostatics, CBC, BMP in am    Day 2: 4/21:     Creat 1 39  Continue IVF's  Patient is more short of breath today compared to previous day and only uses Albuterol PRN  COVID test is negative on 4/21/21  Continue Home medication of Ventolin q6 prn as rescue inhaler  Bedside spirometry  Add Spiriva for daily use      ED Triage Vitals   Temperature Pulse Respirations Blood Pressure SpO2   04/20/21 1332 04/20/21 1332 04/20/21 1332 04/20/21 1332 04/20/21 1332   97 5 °F (36 4 °C) 102 20 148/73 94 %      Temp Source Heart Rate Source Patient Position - Orthostatic VS BP Location FiO2 (%)   04/20/21 1703 04/20/21 1332 04/20/21 1703 04/20/21 1703 --   Tympanic Monitor Lying Left arm       Pain Score       04/20/21 1917       No Pain          Wt Readings from Last 1 Encounters:   04/20/21 83 3 kg (183 lb 10 3 oz)     Additional Vital Signs:   04/21/21 1508  98 1 °F (36 7 °C)  67  20  131/71  83  99 %  None (Room air) Lying   04/21/21 1300  --  110Abnormal   22  --  --  93 %  None (Room air) --   04/21/21 0910  --  --  --  176/77Abnormal   --  --  -- Standing - Orthostatic VS   04/21/21 0905  --  114Abnormal   --  159/75  --  --  -- Sitting - Orthostatic VS   04/21/21 0900  --  98  --  158/76  --  --  -- Lying - Orthostatic VS   04/21/21 0711  97 4 °F (36 3 °C)   92  30Abnormal   149/79  --  93 %  None (Room air) Lying   04/21/21 0551  97 6 °F (36 4 °C)  113Abnormal   34Abnormal   157/75  --  93 %  None (Room air) Standing - Orthostatic VS   04/21/21 0547  97 5 °F (36 4 °C)  98  28Abnormal   154/78  --  93 %  None (Room air) Sitting - Orthostatic VS   04/21/21 0544  97 7 °F (36 5 °C)  90  26Abnormal   151/74  --  93 %  None (Room air) Lying - Orthostatic VS   04/20/21 2340  97 5 °F (36 4 °C)  76  16  166/85  --  96 %  None (Room air) Lying   04/20/21 2335  --  63  16  142/80  --  96 %  None (Room air) Lying   04/20/21 2154  --  --  --  --  --  --  None (Room air) --   04/20/21 1917  --  83  18  141/65  --  98 %  -- --   04/20/21 1703  97 9 °F (36 6 °C)  85  16  136/75  --  100 %  None (Room air) Lying       Pertinent Labs/Diagnostic Test Results:   Results from last 7 days   Lab Units 04/21/21  0543 04/20/21  1542   WBC Thousand/uL 6 40 8 70   HEMOGLOBIN g/dL 12 3* 13 4* HEMATOCRIT % 37 5* 40 4*   PLATELETS Thousands/uL 279 311   NEUTROS ABS Thousands/µL 4 30 6 80     Results from last 7 days   Lab Units 04/21/21  0543 04/20/21  1926 04/20/21  1542   SODIUM mmol/L 137 137 137   POTASSIUM mmol/L 4 5 4 2 4 7   CHLORIDE mmol/L 106 106 100   CO2 mmol/L 23 26 29   ANION GAP mmol/L 8 5 8   BUN mg/dL 22 25 27*   CREATININE mg/dL 1 39 1 68* 2 14*   EGFR ml/min/1 73sq m 50* 39* 29*   CALCIUM mg/dL 8 9 8 5 9 7   MAGNESIUM mg/dL  --   --  1 9   PHOSPHORUS mg/dL  --   --  3 1     Results from last 7 days   Lab Units 04/20/21  1542   AST U/L 25   ALT U/L 31   ALK PHOS U/L 99   TOTAL PROTEIN g/dL 7 9   ALBUMIN g/dL 4 4   TOTAL BILIRUBIN mg/dL 0 41     Results from last 7 days   Lab Units 04/21/21  0543 04/20/21  1926 04/20/21  1542   GLUCOSE RANDOM mg/dL 90 96 110*     Results from last 7 days   Lab Units 04/20/21  1542   TROPONIN I ng/mL <0 01       Results from last 7 days   Lab Units 04/20/21  2209   CLARITY UA  Clear   COLOR UA  Yellow   SPEC GRAV UA  1 020   PH UA  6 0   GLUCOSE UA mg/dl Negative   KETONES UA mg/dl Negative   BLOOD UA  Negative   PROTEIN UA mg/dl 15 (Trace)*   NITRITE UA  Negative   BILIRUBIN UA  Negative   UROBILINOGEN UA mg/dL Negative   LEUKOCYTES UA  Negative   WBC UA /hpf None Seen   RBC UA /hpf None Seen   BACTERIA UA /hpf None Seen   EPITHELIAL CELLS WET PREP /hpf Occasional     4/20  EKG: ECG rate:  95      Rhythm: sinus rhythm      Ectopy: none      QRS axis:  Normal    Conduction: normal      ST segments:  Normal    T waves: normal    Comments:      qtc 404, no STEMI    4/20 CT A&P: No urinary tract calculi      ED Treatment:   Medication Administration from 04/20/2021 1317 to 04/20/2021 2347       Date/Time Order Dose Route Action     04/20/2021 1544 sodium chloride 0 9 % bolus 1,000 mL 1,000 mL Intravenous New Bag        Past Medical History:   Diagnosis Date    CKD (chronic kidney disease)     Hypertension     Microalbuminuria      Present on Admission:   Essential hypertension   COPD [facility]chronic obstructive pulmonary disease) ([facility])      Admitting Diagnosis: Dizziness [R42]  LILLY (acute kidney injury) ([facility] [address] ) [N17 9]  Near syncope [R55]  Age/Sex: 77 y o  male     Admission Orders:  Scheduled Medications:  amLODIPine, 5 mg, Oral, Daily  ergocalciferol, 50,000 Units, Oral, Weekly    Continuous IV Infusions:  sodium chloride, 125 mL/hr, Intravenous, Continuous    PRN Meds:  albuterol, 2 puff, Inhalation, Q6H PRN x 1 4/21  levalbuterol (XOPENEX) inhalation solution 1 25 mg   Q8H prn Nebulizer      Telemetry    Network Utilization Review Department  ATTENTION: Please call with any questions or concerns to [phone] and carefully listen to the prompts so that you are directed to the right person  All voicemails are confidential   Kendra Grand all requests for admission clinical reviews, approved or denied determinations and any other requests to dedicated fax number below belonging to the campus where the patient is receiving treatment   List of dedicated fax numbers for the Facilities:  [address] DENIALS (Administrative/Medical Necessity) [phone]   [address] (Maternity/NICU/Pediatrics) [phone]   [address] [place] [address] Dr Sandra Phillips 5512 [address] [place] [address] Fahad Scott 1481 [address] [facility] [address] [phone]

## 2021-04-21 NOTE — PROGRESS NOTES
[address] [facility] [address] RESIDENCY PROGRESS NOTE  04/21/21       Name: Miles Altman  Age & Sex: 77 y o  male   MRN: [id number]  Unit/Bed#: 7T [facility] 708-01   Encounter: [id number]  PCP: Gomez Tirado MD  Date of Admission: 4/20/2021  1:25 PM     PATIENT INFORMATION     Name: Miles Altman  Age & Sex: 77 y o  male   MRN: [id number]  Current Patient Status: Observation Current Length of Stay: 0 day(s)  Certification Statement: The patient will continue to require additional inpatient hospital stay due to evaluation of his presyncope  Code Status: Level 1 - Full Code    ASSESSMENT/PLAN     Postural dizziness with presyncope  Assessment & Plan  Assessment   Patient has no reported symptoms of seizures, confusion or disorientation  Less likely cardiac etiology as EKG showed NSR with normal axis, normal FL and QTc intervals, and no ST-T wave changes  Also, ECHO from about 2 yrs ago was  reviewed and showed Normal left ventricular systolic function with EF 61%  Also, troponin was negative, and there were no electrolyte abnormalities  Orthostatic testing was negative on 4/21/21 at 9 AM   · Unclear of the etiology, likely secondary to dehydration or vaccination  Etiology is unlikely to be cardiac, orthostatic, or neurogenic in nature     Plan   · Continue Telemetry  · Maintain hydration with maintenance IVF @ 125 cc/hr  · CBC, CMP  · Cardiac Diet  · Fall precautions    COPD (chronic obstructive pulmonary disease) ([facility] [address] )  Assessment & Plan  Assessment  COPD is chronic; however, patient has no formal PFTs since 2019  Spirometry in 2019 showed moderate airflow limitation  No significant improvement in airflow or vital capacity occurred following the administration of bronchodilators  Patient is more short of breath today compared to previous day and only uses Albuterol PRN that he has been using daily  COVID test is negative on 4/21/21      Plan  · Continue Home medication of Ventolin q6 prn as rescue inhaler  · Add Spiriva (tiotropium) 18 mcg daily  · Add Xopenex (levalbuterol) 1 25mg q8h prn  · Respiratory protocol  Essential hypertension  Assessment & Plan  Assessment  Blood pressure is stable and lefty goal per JNC-8  EKG showed NSR with normal axis, normal [place], QTc, no significant ST-T wave changes  Home Medications include Amlodipine 5 mg q d  and lisinopril 40 mg q h s  Plan  - Hold lisinopril 40 mg  - Continue with amlodipine 5 mg    * Acute kidney injury superimposed on chronic kidney disease ([facility] [address] )  Assessment & Plan  Assessment  Patient has a medical history of CKD Stage 2/3, HTN, CKD, and HLD  He follows outpatient with Nephrology  He presented to the ED with complaints of lightheadedness and was found to have an LILLY due to a Cr of 2 14  His baseline is from 1 2 to 1 4  The LILLY is most likely due to dehydration  At the ED, he received a 1L NS bolus after which he had a Cr of 1 68  Urinalysis was not collected  CT Abd/Pelvis did not show acute findings  Physical exam was unremarkable  Repeat Cr on 4/21 was 1 39, suggesting improvement of the LILLY  Plan  · Continue with mIVF  · Hold ACEI as dehydration most likely ensued due to gastrointestinal losses resulting in LILLY failing to autoregulate  · Strict I/O''s  · CBC, CMP, AM   · Judicious use of nephrotoxic drugs    Patient Centered Rounds: I have performed bedside rounds with nursing staff today  Discussions with Specialists or Other Care Team Provider: none  Education and Discussions with Family / Patient: Patient expressing understanding of his condition and is amenable to treatment  We informed him that he should reach out to us if he should have any questions or concerns  Time Spent for Care: 30 minutes  More than 50% of total time spent on counseling and coordination of care as described above  Disposition: The patient appears to have increased work of breathing  Otherwise, he appears well   Discharge pending evaluation of presyncope and respiratory difficulty  SUBJECTIVE     Patient seen and examined with Dr Zenobia Monte, Dr Ar Walters, and Dr Markus Rowe  Overnight, patient was kept on maintenance fluids at a rate of 125 ml/hr  Orthostatics taken at 6AM were positive with increase in heart rate by >20 (Lying: /74, HR 90; Sitting: /78, HR 98; Standing: /75, )  At that time, the patient was asymptomatic and maintained an SpO2 of 97% despite tachypnea  Otherwise, there were no calls or other concerns overnight  Per nursing, he did well overnight but appears to have increased work of breathing and some shaking in the morning  Repeat orthostatics today  9AM were negative (Lying: /76, HR 98; Sitting: /75, ; Standing: /77, HR not on file)  Patient appears to be "belly breathing" but states that he feels well and does not experience distress or shortness of breath  He reports using albuterol as needed for difficulty breathing, stating he typically uses it once daily  He also endorses eating well and drinking 3 glasses of 32 oz water daily  He states he is typically well hydrated  He endorses being a smoker in the past and reports that he stopped using tobacco products two years ago  The patient says he also stopped using alcohol approximately two years ago  Patient denies nausea, diarrhea, vomiting, abdominal pain, chills, and chest pain at this time      OBJECTIVE     Temp:  [97 4 °F (36 3 °C)-98 1 °F (36 7 °C)] 98 1 °F (36 7 °C)  HR:  [] 67  Resp:  [16-34] 20  BP: (131-176)/(65-85) 131/71  SpO2:  [93 %-99 %] 99 %  Vitals:    21 0905 21 0910 21 1300 21 1508   BP: 159/75 (!) 176/77  131/71   BP Location: Left arm Left arm  Left arm   Pulse: (!) 114  (!) 110 67   Resp:   22 20   Temp:    98 1 °F (36 7 °C)   TempSrc:    Temporal   SpO2:   93% 99%   Weight:       Height:            Temperature:   Temp (24hrs), Av 6 °F (36 4 °C), Min:97 4 °F (36 3 °C), Max:98 1 °F (36 7 °C)    Temperature: 98 1 °F (36 7 °C)    Intake & Output:  I/O       04/19 0701 - 04/20 0700 04/20 0701 - 04/21 0700 04/21 0701 - 04/22 0700    IV Piggyback  1000     Total Intake(mL/kg)  1000 (12)     Urine (mL/kg/hr)  1000     Total Output  1000     Net  0                  Weights:   IBW (Ideal Body Weight): 70 7 kg    Body mass index is 27 12 kg/m²  Weight (last 2 days)     Date/Time   Weight    04/20/21 1332   83 3 (183 64)              Physical Exam:   Physical Exam  Constitutional:       General: He is not in acute distress  Appearance: Normal appearance  He is not toxic-appearing  HENT:      Head: Normocephalic and atraumatic  Right Ear: External ear normal       Left Ear: External ear normal       Nose: Nose normal       Mouth/Throat:      Mouth: Mucous membranes are moist    Eyes:      General: No scleral icterus  Conjunctiva/sclera: Conjunctivae normal    Neck:      Musculoskeletal: Neck supple  Cardiovascular:      Rate and Rhythm: Regular rhythm  Tachycardia present  Pulses: Normal pulses  Heart sounds: Murmur (low grade systolic) present  No friction rub  No gallop  Pulmonary:      Breath sounds: No stridor  Wheezing (expiratory, throughout) present  No rhonchi or rales  Comments: increased work of breathing, "belly breathing"  Abdominal:      General: Bowel sounds are normal       Palpations: Abdomen is soft  Tenderness: There is no abdominal tenderness  Musculoskeletal:         General: No swelling  Right lower leg: No edema  Left lower leg: No edema  Skin:     General: Skin is warm and dry  Capillary Refill: Capillary refill takes less than 2 seconds  Neurological:      General: No focal deficit present  Mental Status: He is alert  Psychiatric:         Mood and Affect: Mood normal          Behavior: Behavior normal        LABORATORY DATA     Labs:  I have personally reviewed pertinent reports  Results from last 7 days   Lab Units 04/21/21  0543 04/20/21  1542   WBC Thousand/uL 6 40 8 70   HEMOGLOBIN g/dL 12 3* 13 4*   HEMATOCRIT % 37 5* 40 4*   PLATELETS Thousands/uL 279 311   NEUTROS PCT % 66* 78*   MONOS PCT % 11* 10      Results from last 7 days   Lab Units 04/21/21  0543 04/20/21  1926 04/20/21  1542   POTASSIUM mmol/L 4 5 4 2 4 7   CHLORIDE mmol/L 106 106 100   CO2 mmol/L 23 26 29   BUN mg/dL 22 25 27*   CREATININE mg/dL 1 39 1 68* 2 14*   CALCIUM mg/dL 8 9 8 5 9 7   ALK PHOS U/L  --   --  99   ALT U/L  --   --  31   AST U/L  --   --  25     Results from last 7 days   Lab Units 04/20/21  1542   MAGNESIUM mg/dL 1 9     Results from last 7 days   Lab Units 04/20/21  1542   PHOSPHORUS mg/dL 3 1              Results from last 7 days   Lab Units 04/20/21  1542   TROPONIN I ng/mL <0 01                 * Additional Pertinent Lab Tests Reviewed: All Labs Within Last 24 Hours Reviewed    IMAGING & DIAGNOSTIC TESTING     Radiology Results: I have personally reviewed pertinent reports  Ct Renal Stone Study Abdomen Pelvis Wo Contrast    Addendum Date: 4/20/2021    ADDENDUM: Impression: No urinary tract calculi  Result Date: 4/20/2021  Impression: Urinary tract calculi  Workstation performed: [id number]     Other Diagnostic Testing: I have personally reviewed pertinent reports      ACTIVE MEDICATIONS (LAST 24 HOURS)     Current Facility-Administered Medications   Medication Dose Route Frequency Provider Last Rate    albuterol  2 puff Inhalation Q6H PRN Tiesha Chicas MD      amLODIPine  5 mg Oral Daily Tiesha Chicas MD      ergocalciferol  50,000 Units Oral Weekly Tiesha Chicas MD      levalbuterol  1 25 mg Nebulization Q8H PRN Nusrat Clarke MD      sodium chloride  125 mL/hr Intravenous Continuous Tiesha Chicas  mL/hr (04/21/21 1609)    tiotropium  18 mcg Inhalation Daily Pat Mancera MD       sodium chloride, 125 mL/hr, Last Rate: 125 mL/hr (04/21/21 1609)      albuterol, 2 puff, Q6H PRN  levalbuterol, 1 25 mg, Q8H PRN        VTE Prophylaxis: not indicated      Portions of the record may have been created with voice recognition software  Occasional wrong word or "sound a like" substitutions may have occurred due to the inherent limitations of voice recognition software    Read the chart carefully and recognize, using context, where substitutions have occurred   ==  Estella Pérez [place] [address] Residency

## 2021-04-22 VITALS
WEIGHT: 183.64 LBS | RESPIRATION RATE: 20 BRPM | BODY MASS INDEX: 27.2 KG/M2 | SYSTOLIC BLOOD PRESSURE: 186 MMHG | DIASTOLIC BLOOD PRESSURE: 70 MMHG | HEIGHT: 69 IN | HEART RATE: 72 BPM | TEMPERATURE: 99.1 F | OXYGEN SATURATION: 93 %

## 2021-04-22 PROBLEM — N18.9 ACUTE KIDNEY INJURY SUPERIMPOSED ON CHRONIC KIDNEY DISEASE (HCC): Status: RESOLVED | Noted: 2021-04-20 | Resolved: 2021-04-22

## 2021-04-22 PROBLEM — N17.9 ACUTE KIDNEY INJURY SUPERIMPOSED ON CHRONIC KIDNEY DISEASE (HCC): Status: RESOLVED | Noted: 2021-04-20 | Resolved: 2021-04-22

## 2021-04-22 PROBLEM — R42 POSTURAL DIZZINESS WITH PRESYNCOPE: Status: RESOLVED | Noted: 2021-04-20 | Resolved: 2021-04-22

## 2021-04-22 PROBLEM — R55 POSTURAL DIZZINESS WITH PRESYNCOPE: Status: RESOLVED | Noted: 2021-04-20 | Resolved: 2021-04-22

## 2021-04-22 LAB
ALBUMIN SERPL BCP-MCNC: 3.5 G/DL (ref 3–5.2)
ALP SERPL-CCNC: 84 U/L (ref 43–122)
ALT SERPL W P-5'-P-CCNC: 22 U/L
ANION GAP SERPL CALCULATED.3IONS-SCNC: 7 MMOL/L (ref 5–14)
AST SERPL W P-5'-P-CCNC: 26 U/L (ref 17–59)
BASOPHILS # BLD AUTO: 0 THOUSANDS/ΜL (ref 0–0.1)
BASOPHILS NFR BLD AUTO: 1 % (ref 0–1)
BILIRUB SERPL-MCNC: 0.75 MG/DL
BUN SERPL-MCNC: 17 MG/DL (ref 5–25)
CALCIUM SERPL-MCNC: 9.2 MG/DL (ref 8.4–10.2)
CHLORIDE SERPL-SCNC: 105 MMOL/L (ref 97–108)
CO2 SERPL-SCNC: 24 MMOL/L (ref 22–30)
CREAT SERPL-MCNC: 1.39 MG/DL (ref 0.7–1.5)
EOSINOPHIL # BLD AUTO: 0.2 THOUSAND/ΜL (ref 0–0.4)
EOSINOPHIL NFR BLD AUTO: 4 % (ref 0–6)
ERYTHROCYTE [DISTWIDTH] IN BLOOD BY AUTOMATED COUNT: 13.3 %
GFR SERPL CREATININE-BSD FRML MDRD: 50 ML/MIN/1.73SQ M
GLUCOSE P FAST SERPL-MCNC: 92 MG/DL (ref 70–99)
GLUCOSE SERPL-MCNC: 92 MG/DL (ref 70–99)
HCT VFR BLD AUTO: 38.2 % (ref 41–53)
HGB BLD-MCNC: 12.4 G/DL (ref 13.5–17.5)
LYMPHOCYTES # BLD AUTO: 1.3 THOUSANDS/ΜL (ref 0.5–4)
LYMPHOCYTES NFR BLD AUTO: 25 % (ref 25–45)
MCH RBC QN AUTO: 27.8 PG (ref 26–34)
MCHC RBC AUTO-ENTMCNC: 32.3 G/DL (ref 31–36)
MCV RBC AUTO: 86 FL (ref 80–100)
MONOCYTES # BLD AUTO: 0.6 THOUSAND/ΜL (ref 0.2–0.9)
MONOCYTES NFR BLD AUTO: 12 % (ref 1–10)
NEUTROPHILS # BLD AUTO: 3.1 THOUSANDS/ΜL (ref 1.8–7.8)
NEUTS SEG NFR BLD AUTO: 59 % (ref 45–65)
PLATELET # BLD AUTO: 285 THOUSANDS/UL (ref 150–450)
PMV BLD AUTO: 7.4 FL (ref 8.9–12.7)
POTASSIUM SERPL-SCNC: 4.4 MMOL/L (ref 3.6–5)
PROT SERPL-MCNC: 6.5 G/DL (ref 5.9–8.4)
RBC # BLD AUTO: 4.44 MILLION/UL (ref 4.5–5.9)
SODIUM SERPL-SCNC: 136 MMOL/L (ref 137–147)
WBC # BLD AUTO: 5.3 THOUSAND/UL (ref 4.5–11)

## 2021-04-22 PROCEDURE — 85025 COMPLETE CBC W/AUTO DIFF WBC: CPT | Performed by: FAMILY MEDICINE

## 2021-04-22 PROCEDURE — 80053 COMPREHEN METABOLIC PANEL: CPT | Performed by: FAMILY MEDICINE

## 2021-04-22 PROCEDURE — 99217 PR OBSERVATION CARE DISCHARGE MANAGEMENT: CPT | Performed by: FAMILY MEDICINE

## 2021-04-22 RX ORDER — UMECLIDINIUM BROMIDE AND VILANTEROL TRIFENATATE 62.5; 25 UG/1; UG/1
1 POWDER RESPIRATORY (INHALATION) DAILY
Qty: 1 INHALER | Refills: 1 | Status: SHIPPED | OUTPATIENT
Start: 2021-04-22 | End: 2021-07-12 | Stop reason: CLARIF

## 2021-04-22 RX ADMIN — AMLODIPINE BESYLATE 5 MG: 5 TABLET ORAL at 08:40

## 2021-04-22 RX ADMIN — TIOTROPIUM BROMIDE 18 MCG: 18 CAPSULE ORAL; RESPIRATORY (INHALATION) at 08:40

## 2021-04-22 NOTE — CASE MANAGEMENT
CM met with pt at bedside  Pt reported that he lives alone  Pt reported that he is ADL independent and ambulates on his own without an assistive device  He reported that he quit drinking alcohol and smoking cigarettes about 2-3 years ago  He denied hx and current use of drugs  He reported no VNA or SNF hx  He reported that he is retired  He said that he uses [address]  He said that he has prescription coverage  He has [address] [place] [address]  He reported no POA  CM asked for an emergency contact  He said that his friend, Aleksander Hall is his emergency contact but he said that he has Bill's number at home  Pt said that he will  his own meds from the pharmacy and then take the bus home  He said that he prefers to take the bus

## 2021-04-22 NOTE — PLAN OF CARE
Problem: Potential for Falls  Goal: Patient will remain free of falls  Description: INTERVENTIONS:  - Assess patient frequently for physical needs  -  Identify cognitive and physical deficits and behaviors that affect risk of falls    -  [place] fall precautions as indicated by assessment   - Educate patient/family on patient safety including physical limitations  - Instruct patient to call for assistance with activity based on assessment  - Modify environment to reduce risk of injury  - Consider OT/PT consult to assist with strengthening/mobility  Outcome: Progressing     Problem: PAIN - ADULT  Goal: Verbalizes/displays adequate comfort level or baseline comfort level  Description: Interventions:  - Encourage patient to monitor pain and request assistance  - Assess pain using appropriate pain scale  - Administer analgesics based on type and severity of pain and evaluate response  - Implement non-pharmacological measures as appropriate and evaluate response  - Consider cultural and social influences on pain and pain management  - Notify physician/advanced practitioner if interventions unsuccessful or patient reports new pain  Outcome: Progressing     Problem: INFECTION - ADULT  Goal: Absence or prevention of progression during hospitalization  Description: INTERVENTIONS:  - Assess and monitor for signs and symptoms of infection  - Monitor lab/diagnostic results  - Monitor all insertion sites, i e  indwelling lines, tubes, and drains  - Monitor endotracheal if appropriate and nasal secretions for changes in amount and color  - [place] appropriate cooling/warming therapies per order  - Administer medications as ordered  - Instruct and encourage patient and family to use good hand hygiene technique  - Identify and instruct in appropriate isolation precautions for identified infection/condition  Outcome: Progressing  Goal: Absence of fever/infection during neutropenic period  Description: INTERVENTIONS:  - Monitor WBC    Outcome: Progressing     Problem: SAFETY ADULT  Goal: Patient will remain free of falls  Description: INTERVENTIONS:  - Assess patient frequently for physical needs  -  Identify cognitive and physical deficits and behaviors that affect risk of falls    -  [place] fall precautions as indicated by assessment   - Educate patient/family on patient safety including physical limitations  - Instruct patient to call for assistance with activity based on assessment  - Modify environment to reduce risk of injury  - Consider OT/PT consult to assist with strengthening/mobility  Outcome: Progressing  Goal: Maintain or return to baseline ADL function  Description: INTERVENTIONS:  -  Assess patient's ability to carry out ADLs; assess patient's baseline for ADL function and identify physical deficits which impact ability to perform ADLs (bathing, care of mouth/teeth, toileting, grooming, dressing, etc )  - Assess/evaluate cause of self-care deficits   - Assess range of motion  - Assess patient's mobility; develop plan if impaired  - Assess patient's need for assistive devices and provide as appropriate  - Encourage maximum independence but intervene and supervise when necessary  - Involve family in performance of ADLs  - Assess for home care needs following discharge   - Consider OT consult to assist with ADL evaluation and planning for discharge  - Provide patient education as appropriate  Outcome: Progressing  Goal: Maintain or return mobility status to optimal level  Description: INTERVENTIONS:  - Assess patient's baseline mobility status (ambulation, transfers, stairs, etc )    - Identify cognitive and physical deficits and behaviors that affect mobility  - Identify mobility aids required to assist with transfers and/or ambulation (gait belt, sit-to-stand, lift, walker, cane, etc )  - [place] fall precautions as indicated by assessment  - Record patient progress and toleration of activity level on Mobility SBAR; progress patient to next Phase/Stage  - Instruct patient to call for assistance with activity based on assessment  - Consider rehabilitation consult to assist with strengthening/weightbearing, etc   Outcome: Progressing     Problem: DISCHARGE PLANNING  Goal: Discharge to home or other facility with appropriate resources  Description: INTERVENTIONS:  - Identify barriers to discharge w/patient and caregiver  - Arrange for needed discharge resources and transportation as appropriate  - Identify discharge learning needs (meds, wound care, etc )  - Arrange for interpretive services to assist at discharge as needed  - Refer to Case Management Department for coordinating discharge planning if the patient needs post-hospital services based on physician/advanced practitioner order or complex needs related to functional status, cognitive ability, or social support system  Outcome: Progressing     Problem: Knowledge Deficit  Goal: Patient/family/caregiver demonstrates understanding of disease process, treatment plan, medications, and discharge instructions  Description: Complete learning assessment and assess knowledge base    Interventions:  - Provide teaching at level of understanding  - Provide teaching via preferred learning methods  Outcome: Progressing

## 2021-04-22 NOTE — DISCHARGE INSTRUCTIONS
Acute Kidney Injury (LILLY)  You have been diagnosed with Acute Kidney Injury (LILLY)  The following information has been developed to provide you with information about LILLY and treatment  What is Acute Kidney Injury (LILLY)? LILLY occurs when kidney function decreases over a short period of time  This condition causes a buildup of waste products in the blood and can cause fluid to build up causing swelling in the legs and shortness of breath  Sometimes called Acute Kidney Failure or Acute Renal Failure, LILLY is often reversible if it is found and treated quickly  How do you know if you have LILLY? LILLY is diagnosed by assessing kidney function  This is done by obtaining a blood test to measure the blood level of creatinine  Decreased urine output can sometimes also indicate LILLY  Who is at risk for LILLY? LILLY can happen to anyone but usually happens to people who are already sick and may be in the hospital  People are at higher risk for LILLY if they have any of the following:   age 72 years or older   high or low blood pressure   underlying kidney disease (e g , Chronic Kidney Disease (CKD)   peripheral vascular disease (hardening of arteries)   chronic diseases such as liver disease, heart disease and diabetes   a single kidney    What are the symptoms of LILLY? You may or may not have the symptoms to suggest you have kidney injury until the LILLY has progressed  Some of the symptoms are listed below:   Not making enough urine   Increased swelling in legs    Feeling tired   Trouble breathing or shortness of breath   Nausea   New or worsening confusion    What causes LILLY?   The causes are divided into three categories:   Not enough blood flowing to the kidneys (e g , low blood pressure, bleeding, diarrhea, dehydration)   Injury directly to the kidneys (e g , blood clots, severe infections such as sepsis, medicine toxicity, IV contrast dye used for cardiac catheterization or CT scans)   Blockage to the tubes (ureters) that drain the urine from the kidneys (e g , enlarged prostate, kidney stones, blood clots)    What is the treatment for LILLY? The treatment for LILLY depends on correcting what caused it  Treatment usually involves removing the cause and measures to prevent further injury to the kidneys  This may require the use of intravenous fluids or medications and/or temporary dialysis  Dialysis is a process using a machine that does the job of the kidneys to remove waste and help correct the electrolyte and fluid balance while the kidneys are recovering  If dialysis is needed to treat LILLY, the doctor will assess daily to see if the kidneys are showing signs of recovery  The daily assessments determine how long dialysis needs to continue  Depending on the cause and the extent of damage, an episode of LILLY may resolve in a few days to several weeks to several months  What are the long term effects of having an episode of LILLY?  People who have one episode of LILLY are at an increased risk of having another episode of LILLY as well as other health problems such as kidney disease, stroke, and heart disease   In a small number of people who had unrecognized kidney disease, an LILLY episode may result in Chronic Kidney Disease (CKD) which requires lifelong monitoring and treatment  How do you prevent future episodes of LILLY?  Make sure to follow up with the kidney doctor after hospital discharge and obtain blood work to reassess and monitor kidney function     If you have diabetes, keep your blood sugar in goal range and keep appointments with your diabetes specialist    Gladys Miramontes If you have high blood pressure, have your blood pressure checked regularly to make sure it is in target range  o If you take blood pressure medicine called ACEIs or ARBs (e g , Lisinopril, Enalapril, Diovan, Losartan), your doctor may tell you to skip a dose or two if you have severe dehydration and your blood pressure is running low    Avoid using medicines such as NSAIDs (Nonsteroidal Anti-inflammatory Drugs) and Law-2 Inhibitors (a type of NSAID) that may be harmful to kidney function  These may include the medicines listed in the table that follows  Examples of NSAIDS and Law-2 Inhibitors   Talk to your doctor or healthcare provider before stopping any medicine ordered for you  Celecoxib (CELEBREX) Ketoprofen (ORUDIS, ORUVAIL)   Diclofenac (VOLTAREN, CATAFLAM) Ketorolac (TORADOL)   Diflunisal (DOLOBID) Meloxicam (MOBIC)   Etodolac (LODINE) Nabumetone (RELAFEN)   Fenoprofen (NALFON) Naproxen (ALEVE, NAPROSYN,    NAPRELAN, ANAPROX)   Flurbiprofen (ANSAID) Oxaprozin (DAYPRO)   Ibuprofen (MOTRIN, ADVIL) Piroxicam (FELDENE)   Indomethacin (INDOCIN) Sulindac (CLINORIL)    Tolmetin (Orbie Cooks)     Is there a special diet for people with LILLY? People with LILLY and/or other kidney disease often have high potassium and phosphorus levels in their blood  To protect the kidneys from further injury and to avoid complications, most doctors recommend following a healthy diet choosing foods low potassium and low phosphorus   Limiting dietary potassium to 2 5 grams/day and phosphorus to 800 milligrams/day is recommended   A dietitian can help you with learning more about this type of diet   The tables on the following page may help you to choose lower potassium and phosphorus foods  The following websites are also good sources of information:  [employer] [facility]  org/nutrition/Kidney-Disease-Stages-1-4   [facility]  https://www niddk nih gov/health-information/kidney-disease/chronic-kidney-disease-ckd/eating-nutrition  https://[facility] org/health/articles/15641-renal-diet-basics  [employer] cy    If you have any questions or concerns about your condition, please contact your doctor or healthcare provider    These tables may help you to choose lower potassium and phosphorus foods    AVOID these higher phosphorus* foods: CHOOSE these lower phosphorus* foods:   Milk, pudding , yogurt made from animals and from many soy varieties Rice milk (unfortified), nondairy creamer   Hard cheeses, ricotta, cottage cheese, fat-free cream cheese Regular and low-fat cream cheese   Ice cream, frozen yogurt Sherbet, frozen fruit pops, sorbet   Soups made with milk, dried peas, beans, lentils or other high phosphorus ingredients Soups made with broth, are water-based, or other lower phosphorus ingredients   Whole grains, including whole-grain breads, crackers, cereal, rice and pasta, corn tortillas Refined grains, including white bread, crackers, cereals, rice and pasta   Quick breads, biscuits, cornbread, muffins, pancakes, waffles, granola, wheat germ Homemade refined (white) dinner rolls, bagels, English muffins, sugar cookies, shortbread cookies, michelle food cake   Dried peas (split, black-eyed), beans (black, garbanzo, lima, kidney, navy, garcia), lentils Green peas (canned, frozen), green beans, wax beans   Organ meats, walleye, [place], sardines Lean beef, pork, lamb, poultry, other fish   Nuts and seeds Popcorn   Peanut butter, other nut butters; tofu, veggie or soy burgers Jam, jelly, honey   Chocolate, including chocolate drinks Carob (chocolate-flavored) candy, hard candy,  gumdrops   Radha, pepper-type sodas, flavored mascorro, bottled teas, beer Lemon-lime soda, ginger ale or root beer, plain water, cream soda, grape soda   *Always read labels and avoid foods with ingredients containing "phos"  AVOID these higher potassium foods: CHOOSE these lower potassium foods:      Milk (fat free, low fat, whole, buttermilk, Soy), yogurt    Regular and low-fat cream cheese      Beans (white, [place]), [place] sprouts, spinach Swiss       chard, broccoli, avocado, artichoke, potatoes, sweet      potatoes, tomatoes/tomato sauce, beet greens      Green beans, alfalfa sprouts, bamboo shoots (canned),       cabbage, carrots, cauliflower, corn, cucumber, eggplant,      endive, lettuce, mushrooms, onions, radishes,  watercress,       water chestnuts (canned), rice, peas      Halibut, tuna, cod, snapper, tuna fish, turkey    Egg, lean beef, pork, lamb, shellfish, chicken       Banana, papaya, orange, cantaloupe, dates, raisins and      other dried fruit, pomegranate, avocado      Apple, applesauce, blackberries, raspberries, pears,     watermelon, cucumbers, blueberries, cranberries,       peaches      Almonds, peanuts, hazelnuts, [place], cashew, mixed,      seeds (sunflower, pumpkin)     Homemade refined (white) dinner rolls, bagels,      English muffins, flour tortilla, crackers, prosper      crackers, popcorn, pretzels, spaghetti or macaroni,       hummus      Tomato or vegetable juice, prune juice    Papaya, lucy, or pear nectar, cranberry juice cocktail      Molasses                                                               COPD Exacerbation, Ambulatory Care   GENERAL INFORMATION:   A COPD (chronic obstructive pulmonary disease ) exacerbation  is a flare up or worsening of COPD  Common symptoms include the following:   · Shortness of breath     · A dry cough     · Coughing fits that bring up mucus from your lungs     · Wheezing and chest tightness  Seek immediate care for the following symptoms:   · Confusion, dizziness, or lightheadedness    · Red, swollen, warm arm or leg    · Shortness of breath or chest pain    · Coughing up blood  Treatment for a COPD exacerbation  may include medicines to help decrease swelling and inflammation in your lungs  Medicines may also help open your airways or treat and infection  You may need pulmonary rehabilitation to help you manage your symptoms and improve your quality of life  You may need extra oxygen to help you breathe easier  Prevent another exacerbation:   · Do not smoke, and avoid others who smoke    If you smoke, it is never too late to quit  You may have fewer exacerbations  Ask for information about medicines and support programs that can help you quit  · Avoid triggers that make your symptoms worse  Cold weather and sudden temperature changes can trigger an exacerbation  Fumes from cars and chemicals, air pollution, and perfume can also increase your symptoms  · Use pursed-lip breathing when you feel short of breath  Take a deep breath in through your nose  Slowly breathe out through your mouth with your lips pursed for twice as long as you inhaled  You can also practice this breathing pattern while you bend, lift, climb stairs, or exercise  Pursed-lip breathing slows down your breathing and helps move more air in and out of your lungs  · Exercise for at least 20 minutes each day  Exercise can help increase your energy and decrease shortness of breath  Ask about the best exercise plan for you  · Prevent infections that can be dangerous when you have COPD  Get a flu vaccine every year as soon as it becomes available  Ask if you should also get other vaccines, such as those given to prevent pneumonia and tetanus  Avoid people who are sick, and wash your hands often  Follow up with your healthcare provider as directed:  Write down your questions so you remember to ask them during your visits  CARE AGREEMENT:   You have the right to help plan your care  Learn about your health condition and how it may be treated  Discuss treatment options with your caregivers to decide what care you want to receive  You always have the right to refuse treatment  The above information is an  only  It is not intended as medical advice for individual conditions or treatments  Talk to your doctor, nurse or pharmacist before following any medical regimen to see if it is safe and effective for you    © 2014 [address] is for End User's use only and may not be sold, redistributed or otherwise used for commercial purposes  All illustrations and images included in CareNotes® are the copyrighted property of A D A M , Inc  or José Miguel Clay  Acute Kidney Injury   AMBULATORY CARE:   Acute kidney injury (LILLY) is also called acute kidney failure, or acute renal failure  LILLY happens when your kidneys suddenly stop working correctly  Normally, the kidneys remove fluid, chemicals, and waste from your blood  These wastes are turned into urine by your kidneys  LILLY usually happens over hours or days  When you have LILLY, your kidneys do not remove the waste, chemicals, or extra fluid from your body  A normal amount of urine is not produced  LILLY is usually temporary, but it may become a chronic kidney condition  Causes of LILLY:   · Decreased blood flow to the kidney, such as from hypercalcemia (high blood calcium level) or severe heart disease     · A disease or condition that affects the kidneys, such as hypertension (high blood pressure) or diabetes     · A blockage in the kidney or ureter, such as a kidney or bladder stone, enlarged prostate, or tumor    Common symptoms include the following: You may not have any symptoms with early or mild LILLY  As LILLY progresses, you may have any of the following:  · Decrease in the amount of urine or no urination    · Swelling in your arms, legs, or feet     · Weakness, drowsiness, or no appetite    · Nausea, flank pain, muscle twitching or muscle cramps    · Itchy skin, or your, breath or body smells like urine    · Behavior changes, confusion, disorientation, or seizures    Call 911 if:   · You have sudden chest pain or trouble breathing  Seek care immediately if:   · Your symptoms get worse  Contact your healthcare provider if:   · Your symptoms return  · Your blood sugar or blood pressure level is not within the range your healthcare provider recommends      · You have questions or concerns about your condition or care  Treatment for LILLY  depends upon the cause of your acute kidney injury and how severe it is  Usually, LILLY will be monitored in the hospital  If you have mild LILLY, you may be able to go home to recover  Your healthcare providers will treat the cause of your LILLY  You may need IV fluids if your LILLY was caused by little or no fluid in your body  You may need dialysis to remove waste and extra fluid from your body  Nutrition:  Your healthcare provider may tell you to eat food low in sodium (salt), potassium, phosphorus, or protein  A dietitian can help you plan your meals  Drink liquids as directed: Your healthcare provider may recommend that you drink a certain amount of liquids  This will help your kidneys work better and decrease your risk for dehydration  Ask how much liquid to drink each day and which liquids are best for you  What you can do to manage and prevent LILLY:   · Monitor and manage other health conditions  such as diabetes, high blood pressure, or heart disease  These conditions increase your risk for acute kidney injury  Take your medicines for these conditions as directed  Also, monitor your blood sugar and blood pressure levels as directed  Contact your healthcare provider if your levels are not in the range he or she says it should be  · Talk to your healthcare provider before you take over-the-counter-medicine  NSAIDs, stomach medicine, or laxatives may harm your kidneys and increase your risk for acute kidney injury  If it is okay to take the medicine, follow the directions on the package  Do not take more than directed  · Tell healthcare providers you have had acute kidney injury  before you get contrast liquid for an x-ray or CT scan  Your healthcare provider may give you medicine to prevent kidney problems caused by the liquid  Follow up with your healthcare provider as directed:  Write down your questions so you remember to ask them during your visits     © Copyright [address] Information is for [employer] use only and may not be sold, redistributed or otherwise used for commercial purposes  All illustrations and images included in CareNotes® are the copyrighted property of A D A M , Inc  or Teo Huff  The above information is an  only  It is not intended as medical advice for individual conditions or treatments  Talk to your doctor, nurse or pharmacist before following any medical regimen to see if it is safe and effective for you  COPD (Chronic Obstructive Pulmonary Disease)   WHAT YOU NEED TO KNOW:   COPD (chronic obstructive pulmonary disease) can get worse quickly  Your healthcare providers will help you create a care plan to use at home  The plan will give directions on how to prevent or manage shortness of breath  Your family members or anyone who cares for you will also get directions to help you  DISCHARGE INSTRUCTIONS:   Call your local emergency number (911 in the [address]) if:   · You feel lightheaded, short of breath, and have chest pain  Seek care immediately if:   · You cough up blood  · You are confused, dizzy, or feel faint  · Your arm or leg feels warm, tender, and painful  It may look swollen and red  Call your doctor if:   · You have increased shortness of breath  · You need more medicine than usual to control your symptoms  · You are coughing or wheezing more than usual     · You are coughing up more mucus, or it has a new color or odor  · You gain more than 3 pounds in a week  · You have a fever, a runny or stuffy nose, and a sore throat, or other cold or flu symptoms  · Your skin, lips, or nails start to turn blue  · You have swelling in your legs or ankles  · You are very tired or weak for more than a day  · You notice changes in your mood, or changes in your ability to think or concentrate  · You have questions or concerns about your condition or care      Medicines: · Short-acting bronchodilators  may be called rescue inhalers or relievers  They relieve sudden, severe symptoms and start to work right away  · Long-acting bronchodilators  may be called controllers or maintenance medicine  This medicine helps open the airway over time, and is used to decrease and prevent breathing problems  Long-acting bronchodilators should not be used to treat sudden, severe symptoms, such as trouble breathing  · Take your medicine as directed  Contact your healthcare provider if you think your medicine is not helping or if you have side effects  Tell him or her if you are allergic to any medicine  Keep a list of the medicines, vitamins, and herbs you take  Include the amounts, and when and why you take them  Bring the list or the pill bottles to follow-up visits  Carry your medicine list with you in case of an emergency  Help make breathing easier:   · Use pursed-lip breathing any time you feel short of breath  Take a deep breath in through your nose  Slowly breathe out through your mouth with your lips pursed  Try to take 2 times as long to breathe out as to breathe in  This helps you get rid of as much air from your lungs as possible  You can also practice this breathing pattern while you bend, lift, climb stairs, or exercise  It slows down your breathing and helps move more air in and out of your lungs  · Avoid anything that makes your symptoms worse  Stay out of high altitudes and places with high humidity  Stay inside, or cover your mouth and nose with a scarf when you are outside in cold weather  Stay inside on days when air pollution or pollen counts are high  Do not use aerosol sprays such as deodorant, bug spray, and hairspray  · Exercise daily  Exercise for at least 20 minutes each day to help increase your energy and decrease shortness of breath  Talk to your healthcare provider about the best exercise plan for you         Manage COPD and help prevent exacerbations:  COPD is a serious condition that gets worse over time  A COPD exacerbation means your symptoms suddenly get worse  It is important to prevent exacerbations  An exacerbation can cause more lung damage  COPD cannot be cured, but you can take action to feel better and prevent exacerbations:  · Do not smoke  Nicotine and other chemicals in cigarettes and cigars can cause lung damage and make your COPD worse  Ask your healthcare provider for information if you currently smoke and need help to quit  E-cigarettes or smokeless tobacco still contain nicotine  Talk to your healthcare provider before you use these products  · Avoid secondhand smoke  This is smoke another person exhales  Even if you have never smoked or have quit, it is important to avoid secondhand smoke  This smoke can also cause lung damage or trigger an exacerbation  · Go to pulmonary rehabilitation (rehab) if directed  Rehab is a program run by specialists who help you learn to manage COPD  Examples include a pulmonologist (lung specialist), dietitian, or exercise therapist  The specialists will help you make a plan to avoid triggers that cause an exacerbation  · Take your medicines as directed  Refill your medicines before you are out so that you do not miss a dose  Ask your healthcare provider if you have any questions on how to take your medicines  · Protect yourself from germs  Germs can get into your lungs and cause an infection  An infection in your lungs can create more mucus and make it harder to breathe  An infection can also create swelling in your airway and prevent air from getting in  You can decrease your risk for infection by doing the following:         ? Wash your hands often with soap and water  Carry germ-killing gel with you  You can use the gel to clean your hands when soap and water are not available  ? Do not touch your eyes, nose, or mouth unless you have washed your hands first     ?  Always cover your mouth when you cough  Cough into a tissue or your shirtsleeve so you do not spread germs from your hands  ? Try to avoid people who have a cold or the flu  If you are sick, stay away from others as much as possible  ? Ask about vaccines  Influenza (the flu) and pneumonia can become life-threatening for a person who has COPD  Get a flu vaccine each year as soon as it becomes available  The pneumonia vaccine may be given every 5 years, or as directed  Ask about other vaccines you may need and when to get them  · Drink liquids as directed  You may need to drink more liquid than usual  Liquid will help to keep your air passages moist and help you cough up mucus  Ask how much liquid to drink each day and which liquids are best for you  Follow up with your doctor as directed: You may need more tests  Your doctor may refer you to a specialist, depending on your needs  Some specialist services may be available through your pulmonary rehab program  Write down your questions so you remember to ask them during your visits  © Copyright [address] Information is for End User's use only and may not be sold, redistributed or otherwise used for commercial purposes  All illustrations and images included in CareNotes® are the copyrighted property of A D A M , Inc  or [address] [place] [address]  The above information is an  only  It is not intended as medical advice for individual conditions or treatments  Talk to your doctor, nurse or pharmacist before following any medical regimen to see if it is safe and effective for you  Umeclidinium/Vilanterol (Anoro Ellipta) - (By breathing)   Why this medicine is used:   Treats breathing problems caused by chronic obstructive pulmonary disease (COPD), including chronic bronchitis and emphysema    Contact a nurse or doctor right away if you have:  · Fast, pounding, or uneven heartbeat, chest pain, increased trouble breathing  · Dizziness, lightheadedness, or fainting  · Decrease in how much or how often you urinate, difficult or painful urination  · Increased hunger, thirst, or urination, weight loss  · Eye pain, vision loss or changes  · Seizures or tremors, nervousness   © Copyright [address] Information is for End User's use only and may not be sold, redistributed or otherwise used for commercial purposes  Ergocalciferol (By mouth)   Ergocalciferol ([id number]-ol)  Increases the amount of vitamin D in your body  Also treats problems caused by not having enough calcium in the body (such as rickets)  Ergocalciferol is a form of vitamin D    Brand Name(s): Calcidol, Calciferol, Drisdol   There may be other brand names for this medicine  When This Medicine Should Not Be Used: You should not use this medicine if you have had an allergic reaction to vitamin D  How to Use This Medicine:   Capsule, Liquid Filled Capsule, Liquid, Tablet  · Your doctor will tell you how much medicine to use  Do not use more than directed  · Follow the instructions on the medicine label if you are using this medicine without a prescription  · You may take this medicine with or without food  · Use the dropper that comes with the oral liquid to measure your dose  This medicine may either be mixed with foods such as cereal and fruit juice or dropped into the mouth  If a dose is missed:   · Take a dose as soon as you remember  If it is almost time for your next dose, wait until then and take a regular dose  Do not take extra medicine to make up for a missed dose  How to Store and Dispose of This Medicine:   · Store the medicine in a closed container at room temperature, away from heat, moisture, and direct light  · Ask your pharmacist, doctor, or health caregiver about the best way to dispose of any outdated medicine or medicine no longer needed  · Keep all medicine out of the reach of children  Never share your medicine with anyone    Drugs and Foods to Avoid:   Ask your doctor or pharmacist before using any other medicine, including over-the-counter medicines, vitamins, and herbal products  · Avoid antacids that contain aluminum (such as Amphojel®, Maalox®, Mylanta®) while using this medicine  · Make sure your doctor knows if you are also using carbamazepine (Tegretol®), cholestyramine Vianeyalbina Beck), mineral oil, orlistat (Jose Sibley), phenobarbital (Susi King), or a diuretic or "water pill" (such as hydrochlorothiazide [HCTZ])  Warnings While Using This Medicine:   · Make sure your doctor knows if you are pregnant or breastfeeding  · Make sure your doctor knows about all health problems that you have  Possible Side Effects While Using This Medicine:   Call your doctor right away if you notice any of these side effects:  · Allergic reaction: Itching or hives, swelling in your face or hands, swelling or tingling in your mouth or throat, chest tightness, trouble breathing  · Bone pain  · Cloudy urine  · Irregular heartbeat  · Severe stomach pain  · Weakness, headache, extreme tiredness  If you notice these less serious side effects, talk with your doctor:   · Diarrhea or constipation  · Nausea and vomiting  If you notice other side effects that you think are caused by this medicine, tell your doctor  Call your doctor for medical advice about side effects  You may report side effects to FDA at 0-275-FDA-0800  © Copyright [address] Information is for End User's use only and may not be sold, redistributed or otherwise used for commercial purposes  The above information is an  only  It is not intended as medical advice for individual conditions or treatments  Talk to your doctor, nurse or pharmacist before following any medical regimen to see if it is safe and effective for you  Amlodipine (By mouth)   Amlodipine ([id number]-[id number])  Lowers high blood pressure and relieves chronic stable angina (chest pain)   This medicine is a calcium channel blocker  Brand Name(s): Praveen Skinner   There may be other brand names for this medicine  When This Medicine Should Not Be Used: This medicine is not right for everyone  Do not use it if you had an allergic reaction to amlodipine  How to Use This Medicine:   Liquid, Tablet, Dissolving Tablet  · Take your medicine as directed  Your dose may need to be changed several times to find what works best for you  Take this medicine at the same time each day  · Read and follow the patient instructions that come with this medicine  Talk to your doctor or pharmacist if you have any questions  · Missed dose: Take a dose as soon as you remember  If it is almost time for your next dose, wait until then and take a regular dose  Do not take extra medicine to make up for a missed dose  · Store the medicine in a closed container at room temperature, away from heat, moisture, and direct light  Store the oral liquid in the refrigerator  Do not freeze  Drugs and Foods to Avoid:   Ask your doctor or pharmacist before using any other medicine, including over-the-counter medicines, vitamins, and herbal products  · Some medicines can affect how amlodipine works  Tell your doctor if you are also using clarithromycin, cyclosporine, diltiazem, itraconazole, ritonavir, sildenafil, simvastatin, or tacrolimus  Warnings While Using This Medicine:   · Tell your doctor if you are pregnant or breastfeeding, or if you have liver disease, or heart or blood vessel disease (including coronary artery disease, aortic stenosis)  · This medicine may cause the following problems:  ? Low blood pressure  ? Worsening chest pain  ? Increased risk of heart attack  · This medicine could lower your blood pressure too much, especially when you first use it or if you are dehydrated  Stand or sit up slowly if you feel lightheaded or dizzy  · Do not stop using this medicine without asking your doctor, even if you feel well  This medicine will not cure high blood pressure, but it will help keep it in normal range  You may have to take blood pressure medicine for the rest of your life  · Your doctor will check your progress and the effects of this medicine at regular visits  Keep all appointments  · Keep all medicine out of the reach of children  Never share your medicine with anyone  Possible Side Effects While Using This Medicine:   Call your doctor right away if you notice any of these side effects:  · Allergic reaction: Itching or hives, swelling in your face or hands, swelling or tingling in your mouth or throat, chest tightness, trouble breathing  · Lightheadedness, dizziness, fainting  · New or worsening chest pain  · Swelling in your hands, ankles, or legs  · Trouble breathing, nausea, unusual sweating  If you notice other side effects that you think are caused by this medicine, tell your doctor  Call your doctor for medical advice about side effects  You may report side effects to FDA at 8-025-FDA-8695  © Copyright [address] [place] [address] Drive Information is for End User's use only and may not be sold, redistributed or otherwise used for commercial purposes  The above information is an  only  It is not intended as medical advice for individual conditions or treatments  Talk to your doctor, nurse or pharmacist before following any medical regimen to see if it is safe and effective for you  Lisinopril (By mouth)   Lisinopril (lye-SIN-oh-pril)  Treats high blood pressure and heart failure  Also given to reduce the risk of death after a heart attack  This medicine is an ACE inhibitor  Brand Name(s): Prinivil, Qbrelis, Zestril   There may be other brand names for this medicine  When This Medicine Should Not Be Used: This medicine is not right for everyone  Do not use it if you had an allergic reaction to lisinopril or another ACE inhibitor, or if you are pregnant    How to Use This Medicine:   Liquid, Tablet  · Take your medicine as directed  Your dose may need to be changed several times to find what works best for you  · Oral liquid: Measure the oral liquid medicine with a marked measuring spoon, oral syringe, or medicine cup  · Missed dose: Take a dose as soon as you remember  If it is almost time for your next dose, wait until then and take a regular dose  Do not take extra medicine to make up for a missed dose  · Store the medicine in a closed container at room temperature, away from heat, moisture, and direct light  Drugs and Foods to Avoid:   Ask your doctor or pharmacist before using any other medicine, including over-the-counter medicines, vitamins, and herbal products  · Do not use this medicine together with aliskiren if you have diabetes  · Some foods and medicines may affect how lisinopril works  Tell your doctor if you are using any of the following:   ? Aliskiren, everolimus, lithium, sirolimus, temsirolimus  ? Another blood pressure medicine, including an angiotensin receptor blocker (ARB)  ? Diuretic (water pill, including amiloride, spironolactone, triamterene)  ? Insulin or diabetes medicine  ? NSAID pain or arthritis medicine (including aspirin, celecoxib, diclofenac, ibuprofen, naproxen)  · Ask your doctor before you use any medicine, supplement, or salt substitute that contains potassium  Warnings While Using This Medicine:   · It is not safe to take this medicine during pregnancy  It could harm an unborn baby  Tell your doctor right away if you become pregnant  · Tell your doctor if you are breastfeeding, or if you have kidney disease, liver disease, diabetes, or heart or blood vessel disease  · This medicine may cause the following problems:  ? Angioedema (severe swelling)  ? Kidney problems  ? Serious liver problems  · This medicine could lower your blood pressure too much, especially when you first use it or if you are dehydrated   Stand or sit up slowly if you feel lightheaded or dizzy  · Do not stop using this medicine without asking your doctor, even if you feel well  This medicine will not cure your high blood pressure, but it will help keep it in a normal range  You may have to take blood pressure medicine for the rest of your life  · Tell any doctor or dentist who treats you that you are using this medicine  · Your doctor will do lab tests at regular visits to check on the effects of this medicine  Keep all appointments  · Keep all medicine out of the reach of children  Never share your medicine with anyone  Possible Side Effects While Using This Medicine:   Call your doctor right away if you notice any of these side effects:  · Allergic reaction: Itching or hives, swelling in your face or hands, swelling or tingling in your mouth or throat, chest tightness, trouble breathing  · Blistering, peeling, or red skin rash  · Change in how much or how often you urinate  · Confusion, weakness, uneven heartbeat, trouble breathing, numbness or tingling in your hands, feet, or lips  · Dark urine or pale stools, nausea, vomiting, loss of appetite, stomach pain, yellow skin or eyes  · Fever, chills, sore throat, body aches  · Lightheadedness, dizziness, fainting  · Severe stomach pain (with or without nausea or vomiting)  If you notice these less serious side effects, talk with your doctor:   · Dry cough  If you notice other side effects that you think are caused by this medicine, tell your doctor  Call your doctor for medical advice about side effects  You may report side effects to FDA at 8-055-FDA-3767  © Copyright [address] [place] [address] Drive Information is for End User's use only and may not be sold, redistributed or otherwise used for commercial purposes  The above information is an  only  It is not intended as medical advice for individual conditions or treatments   Talk to your doctor, nurse or pharmacist before following any medical regimen to see if it is safe and effective for you

## 2021-04-22 NOTE — CASE MANAGEMENT
CM notified by [address] [place] [address] that [facility] needs prior auth  CM called [phone] and was informed that Bevespi and Stiolto Respimat are under formulary and does not need prior auth  Family Medicine made aware and sent Bevespi to [address] [place] [address]

## 2021-04-23 ENCOUNTER — TRANSITIONAL CARE MANAGEMENT (OUTPATIENT)
Dept: FAMILY MEDICINE CLINIC | Facility: CLINIC | Age: 76
End: 2021-04-23

## 2021-05-19 ENCOUNTER — VBI (OUTPATIENT)
Dept: ADMINISTRATIVE | Facility: OTHER | Age: 76
End: 2021-05-19

## 2021-05-19 ENCOUNTER — IMMUNIZATIONS (OUTPATIENT)
Dept: FAMILY MEDICINE CLINIC | Facility: HOSPITAL | Age: 76
End: 2021-05-19

## 2021-05-19 DIAGNOSIS — Z23 ENCOUNTER FOR IMMUNIZATION: Primary | ICD-10-CM

## 2021-05-19 PROCEDURE — 0012A SARS-COV-2 / COVID-19 MRNA VACCINE (MODERNA) 100 MCG: CPT

## 2021-05-19 PROCEDURE — 91301 SARS-COV-2 / COVID-19 MRNA VACCINE (MODERNA) 100 MCG: CPT

## 2021-05-24 ENCOUNTER — OFFICE VISIT (OUTPATIENT)
Dept: DENTISTRY | Facility: CLINIC | Age: 76
End: 2021-05-24

## 2021-05-24 VITALS — HEART RATE: 77 BPM | SYSTOLIC BLOOD PRESSURE: 160 MMHG | DIASTOLIC BLOOD PRESSURE: 72 MMHG | TEMPERATURE: 97.5 F

## 2021-05-24 DIAGNOSIS — K02.9 DENTAL CARIES: Primary | ICD-10-CM

## 2021-05-24 DIAGNOSIS — K08.109 COMPLETE EDENTULISM, UNSPECIFIED EDENTULISM CLASS: ICD-10-CM

## 2021-05-24 PROCEDURE — D7250: HCPCS | Performed by: DENTIST

## 2021-05-24 PROCEDURE — D7140 EXTRACTION, ERUPTED TOOTH OR EXPOSED ROOT (ELEVATION AND/OR FORCEPS REMOVAL): HCPCS | Performed by: DENTIST

## 2021-05-24 NOTE — PROGRESS NOTES
Oral Surgery    [facility] presents for Ext #4 and #5, 6 root tips  [address], patient denies any changes  Obtained a direct and personal consent  Risks and complications were explained  Pt agreed and consented  Consent scanned in doc center  Pre-Op BP slightly high  Administered 68mg of 4% Septocaine w/ 1:100,000 epi via block/infiltration  ? Adequate anesthesia obtained, reflected gingiva, elevated, and extracted #4, 5,6  Socket irrigated, and one 4 0 chromic gut sutures placed  Post-op 1PA taken to verify complete excavation of all root tips  Upon dismissal, patient received POI, ice, gauze      NV: prelim impression ASHLEE CHUNG

## 2021-06-24 ENCOUNTER — HOSPITAL ENCOUNTER (OUTPATIENT)
Dept: ULTRASOUND IMAGING | Facility: HOSPITAL | Age: 76
Discharge: HOME | End: 2021-06-24
Attending: INTERNAL MEDICINE
Payer: COMMERCIAL

## 2021-06-24 DIAGNOSIS — N28.1 CYST OF KIDNEY, ACQUIRED: ICD-10-CM

## 2021-06-24 DIAGNOSIS — J45.990 EXERCISE-INDUCED ASTHMA: ICD-10-CM

## 2021-06-24 DIAGNOSIS — N18.31 CKD STAGE G3A/A2, GFR 45-59 AND ALBUMIN CREATININE RATIO 30-299 MG/G (HCC): ICD-10-CM

## 2021-06-24 PROCEDURE — 76770 US EXAM ABDO BACK WALL COMP: CPT

## 2021-06-28 RX ORDER — ALBUTEROL SULFATE 90 UG/1
AEROSOL, METERED RESPIRATORY (INHALATION)
Qty: 8.5 G | Refills: 0 | Status: SHIPPED | OUTPATIENT
Start: 2021-06-28 | End: 2021-08-27 | Stop reason: SDUPTHER

## 2021-07-02 ENCOUNTER — TELEPHONE (OUTPATIENT)
Dept: NEPHROLOGY | Facility: CLINIC | Age: 76
End: 2021-07-02

## 2021-07-02 NOTE — RESULT ENCOUNTER NOTE
Please inform the patient most recent renal ultrasound is stable renal cysts slightly improved from prior    We can discuss further at his next upcoming visit if he has any questions or concerns in the interim please let me know thanks

## 2021-07-02 NOTE — TELEPHONE ENCOUNTER
----- Message from Elyssa Lyn MD sent at 7/2/2021  1:20 PM EDT -----  Please inform the patient most recent renal ultrasound is stable renal cysts slightly improved from prior    We can discuss further at his next upcoming visit if he has any questions or concerns in the interim please let me know thanks

## 2021-07-02 NOTE — TELEPHONE ENCOUNTER
Patient returned phone call and I have made him aware renal ultrasound stable, renal cyst has improved from previous ultrasound  Patient has no questions or concerns at this time

## 2021-07-12 ENCOUNTER — OFFICE VISIT (OUTPATIENT)
Dept: FAMILY MEDICINE CLINIC | Facility: CLINIC | Age: 76
End: 2021-07-12

## 2021-07-12 VITALS
HEIGHT: 69 IN | WEIGHT: 183 LBS | OXYGEN SATURATION: 95 % | DIASTOLIC BLOOD PRESSURE: 58 MMHG | HEART RATE: 88 BPM | SYSTOLIC BLOOD PRESSURE: 132 MMHG | TEMPERATURE: 98 F | BODY MASS INDEX: 27.11 KG/M2 | RESPIRATION RATE: 16 BRPM

## 2021-07-12 DIAGNOSIS — I10 ESSENTIAL HYPERTENSION: ICD-10-CM

## 2021-07-12 DIAGNOSIS — J44.9 CHRONIC OBSTRUCTIVE PULMONARY DISEASE, UNSPECIFIED COPD TYPE (HCC): ICD-10-CM

## 2021-07-12 DIAGNOSIS — N18.2 CKD (CHRONIC KIDNEY DISEASE) STAGE 2, GFR 60-89 ML/MIN: ICD-10-CM

## 2021-07-12 PROCEDURE — 99213 OFFICE O/P EST LOW 20 MIN: CPT | Performed by: FAMILY MEDICINE

## 2021-07-12 PROCEDURE — 1160F RVW MEDS BY RX/DR IN RCRD: CPT | Performed by: FAMILY MEDICINE

## 2021-07-12 PROCEDURE — 3725F SCREEN DEPRESSION PERFORMED: CPT | Performed by: FAMILY MEDICINE

## 2021-07-12 PROCEDURE — 1101F PT FALLS ASSESS-DOCD LE1/YR: CPT | Performed by: FAMILY MEDICINE

## 2021-07-12 PROCEDURE — 3288F FALL RISK ASSESSMENT DOCD: CPT | Performed by: FAMILY MEDICINE

## 2021-07-12 PROCEDURE — 3008F BODY MASS INDEX DOCD: CPT | Performed by: FAMILY MEDICINE

## 2021-07-12 PROCEDURE — 1036F TOBACCO NON-USER: CPT | Performed by: FAMILY MEDICINE

## 2021-07-12 RX ORDER — LISINOPRIL 40 MG/1
40 TABLET ORAL EVERY EVENING
Qty: 90 TABLET | Refills: 3 | Status: SHIPPED | OUTPATIENT
Start: 2021-07-12 | End: 2021-10-13 | Stop reason: SDUPTHER

## 2021-07-12 RX ORDER — BLOOD PRESSURE TEST KIT
KIT MISCELLANEOUS DAILY
Qty: 1 KIT | Refills: 0 | Status: SHIPPED | OUTPATIENT
Start: 2021-07-12

## 2021-07-12 RX ORDER — AMLODIPINE BESYLATE 5 MG/1
5 TABLET ORAL DAILY
Qty: 90 TABLET | Refills: 3 | Status: SHIPPED | OUTPATIENT
Start: 2021-07-12 | End: 2021-10-13 | Stop reason: SDUPTHER

## 2021-07-12 NOTE — PROGRESS NOTES
[facility] [place]   [facility], [address]  Phone#  [phone]  Fax#  [phone]    ASSESSMENT and PLAN  1  COPD  · Not in acute exacerbation  · Controlled  · Physical exam without wheezing, rhonchi, rales  · Not had any formal PFTs  Spirometry in 2019 showed moderate airflow limitation  No significant improvement in airflow or vital capacity occurred following the administration of bronchodilators  · Home medications: Bevespi (Glycopyrrolate-Formoterol) 2 puff BID, Albuterol prn  · Continue home Bevespi 2 puff BID with Albuterol prn    2  Essential Hypertension  Assessment   Current /58,  Goal BP <140/90 per JNC 8 guidelines, controlled   Current Home Medications: Lisinopril 40 mg, Amlodipine 5 mg    Statin currently used: none, will discuss starting on Statin at next visit  , ASCVD Risk- 32   Educated and counseled on the importance of Lifestyle changes including smoking cessation, controlling blood sugar and lipids, DASH diet, reducing sodium intake to no more than 2,400 mg/day and on moderate physical activity 3-4 days per week averaging 40 minutes per session  Monitor excessive caffeine intake,    Red flag symptoms reviewed with patient including chest pain, SOB, changes in vision, vomiting, headaches, paresthesias and ER precautions given if these develop  Plan   Continue Lisinopril 40 mg, Amlodipine 5 mg    Encourage to check BP's at least 2x per week  Always take it in the left arm, while seated, and after waiting for at least 5 minutes  Record BP readings in blood pressure logs provided  Diagnoses and all orders for this visit:    Essential hypertension  -     amLODIPine (NORVASC) 5 mg tablet; Take 1 tablet (5 mg total) by mouth daily  -     lisinopril (ZESTRIL) 40 mg tablet;  Take 1 tablet (40 mg total) by mouth every evening  -     Blood Pressure KIT; Use daily    CKD (chronic kidney disease) stage 2, GFR 60-89 ml/min  -     amLODIPine (NORVASC) 5 mg tablet; Take 1 tablet (5 mg total) by mouth daily    Chronic obstructive pulmonary disease, unspecified COPD type ([facility] )  -     glycopyrrolate-formoterol (BEVESPI AEROSPHERE) 9-4 8 MCG/ACT inhaler; Inhale 2 puffs 2 (two) times a day        HISTORY OF PRESENT ILLNESS  Salazar Link  is a 77 y o  male with a significant PMHx of Essetial hypertension, COPD who presents to the clinic for management of their chronic medical conditions  Patient's medical conditions are stable unless noted otherwise above  Patient has not had any recent hospitalizations, or medical emergencies since last visit  Patient has no further complaints other than what is mentioned in the ROS  REVIEW OF SYSTEMS  Review of Systems   Constitutional: Negative for chills, fatigue, fever and unexpected weight change  HENT: Negative for congestion, rhinorrhea, sinus pressure and sore throat  Eyes: Negative for visual disturbance  Respiratory: Negative for cough, chest tightness, shortness of breath and wheezing  Cardiovascular: Negative for chest pain  Gastrointestinal: Negative for abdominal distention, abdominal pain, blood in stool, constipation, diarrhea, nausea and vomiting  Genitourinary: Negative for difficulty urinating, dysuria, frequency, hematuria and urgency  Musculoskeletal: Negative for back pain and neck pain  Skin: Negative for rash  Allergic/Immunologic: Negative  Neurological: Negative for dizziness, weakness, light-headedness, numbness and headaches  Psychiatric/Behavioral: Negative for behavioral problems         PAST MEDICAL HISTORY   Past Medical History:   Diagnosis Date    CKD (chronic kidney disease)     Hypertension     Microalbuminuria      Past Surgical History:   Procedure Laterality Date    APPENDECTOMY      BEDSIDE SPIROMETRY WITH BRONCHODILATOR PRE/POST  4/21/2021    HERNIA REPAIR      STRABISMUS SURGERY       Social History     Socioeconomic History    Marital status: Single     Spouse name: Not on file    Number of children: Not on file    Years of education: Not on file    Highest education level: Not on file   Occupational History    Not on file   Tobacco Use    Smoking status: Former Smoker     Types: Cigarettes     Start date: 12/18/2018    Smokeless tobacco: Never Used   Vaping Use    Vaping Use: Never used   Substance and Sexual Activity    Alcohol use: Yes     Comment: Rarely; once a year    Drug use: Never    Sexual activity: Not on file   Other Topics Concern    Not on file   Social History Narrative    Not on file     Social Determinants of Health     Financial Resource Strain:     Difficulty of Paying Living Expenses:    Food Insecurity:     Worried About Running Out of Food in the Last Year:     [address] in the Last Year:    Transportation Needs:     Lack of Transportation (Medical):      Lack of Transportation (Non-Medical):    Physical Activity:     Days of Exercise per Week:     Minutes of Exercise per Session:    Stress:     Feeling of Stress :    Social Connections:     Frequency of Communication with Friends and Family:     Frequency of Social Gatherings with Friends and Family:     Attends Sikh Services:     Active Member of Clubs or Organizations:     Attends Club or Organization Meetings:     Marital Status:    Intimate Partner Violence:     Fear of Current or Ex-Partner:     Emotionally Abused:     Physically Abused:     Sexually Abused:      Family History   Problem Relation Age of Onset    No Known Problems Mother     No Known Problems Father        MEDICATIONS    Current Outpatient Medications:     albuterol (PROVENTIL HFA,VENTOLIN HFA) 90 mcg/act inhaler, INHALE 2 PUFFS EVERY 6 HOURS AS NEEDED FOR WHEEZING OR SHORTNESS OF BREATH (15 MINUTES BEFOREEXERCISE), Disp: 8 5 g, Rfl: 0    amLODIPine (NORVASC) 5 mg tablet, Take 1 tablet (5 mg total) by mouth daily, Disp: 90 tablet, Rfl: 3    ergocalciferol (ERGOCALCIFEROL) 1 25 MG (29799 UT) capsule, Take 1 capsule (50,000 Units total) by mouth once a week, Disp: 12 capsule, Rfl: 0    glycopyrrolate-formoterol (BEVESPI AEROSPHERE) 9-4 8 MCG/ACT inhaler, Inhale 2 puffs 2 (two) times a day, Disp: 1 Inhaler, Rfl: 2    lisinopril (ZESTRIL) 40 mg tablet, Take 1 tablet (40 mg total) by mouth every evening, Disp: 90 tablet, Rfl: 3    umeclidinium-vilanterol (Anoro Ellipta) 62 5-25 MCG/INH inhaler, Inhale 1 puff daily, Disp: 1 Inhaler, Rfl: 1    PHYSICAL EXAM  There were no vitals filed for this visit  Wt Readings from Last 3 Encounters:   04/20/21 83 3 kg (183 lb 10 3 oz)   02/24/21 83 kg (183 lb)   09/02/20 86 2 kg (190 lb)    , There is no height or weight on file to calculate BMI  Physical Exam  Vitals and nursing note reviewed  Constitutional:       General: He is not in acute distress  Appearance: He is well-developed  He is not toxic-appearing  HENT:      Head: Normocephalic and atraumatic  Eyes:      Extraocular Movements: Extraocular movements intact  Pupils: Pupils are equal, round, and reactive to light  Cardiovascular:      Rate and Rhythm: Normal rate and regular rhythm  Heart sounds: Normal heart sounds  No murmur heard  Pulmonary:      Effort: Pulmonary effort is normal  No respiratory distress  Breath sounds: Normal breath sounds  No wheezing, rhonchi or rales  Abdominal:      General: Bowel sounds are normal  There is no distension  Palpations: Abdomen is soft  Tenderness: There is no abdominal tenderness  There is no guarding  Musculoskeletal:         General: Normal range of motion  Cervical back: Normal range of motion and neck supple  Skin:     General: Skin is warm and dry  Neurological:      Mental Status: He is alert and oriented to person, place, and time     Psychiatric:         Behavior: Behavior normal        LABS/IMAGING  Hemoglobin A1C   Date Value Ref Range Status   03/04/2019 5 9 4 2 - 6 3 % Final     HDL, Direct   Date Value Ref Range Status   03/04/2019 37 (L) 40 - 59 mg/dL Final     Comment:       HDL Cholesterol:       High    >60 mg/dL       Low     <41 mg/dL  Specimen collection should occur prior to Metamizole administration due to the potential for falsley depressed results  Triglycerides   Date Value Ref Range Status   03/04/2019 102 <150 mg/dL Final     Comment:       Triglyceride:     Normal          <150 mg/dl     Borderline High 150-199 mg/dl     High            200-499 mg/dl        Very High       >499 mg/dl    Specimen collection should occur prior to N-Acetylcysteine or Metamizole administration due to the potential for falsely depressed results        WBC   Date Value Ref Range Status   04/22/2021 5 30 4 50 - 11 00 Thousand/uL Final   04/21/2021 6 40 4 50 - 11 00 Thousand/uL Final   04/20/2021 8 70 4 50 - 11 00 Thousand/uL Final     Hemoglobin   Date Value Ref Range Status   04/22/2021 12 4 (L) 13 5 - 17 5 g/dL Final   04/21/2021 12 3 (L) 13 5 - 17 5 g/dL Final   04/20/2021 13 4 (L) 13 5 - 17 5 g/dL Final     Platelets   Date Value Ref Range Status   04/22/2021 285 150 - 450 Thousands/uL Final   04/21/2021 279 150 - 450 Thousands/uL Final   04/20/2021 311 150 - 450 Thousands/uL Final     Potassium   Date Value Ref Range Status   04/22/2021 4 4 3 6 - 5 0 mmol/L Final   04/21/2021 4 5 3 6 - 5 0 mmol/L Final   04/20/2021 4 2 3 6 - 5 0 mmol/L Final     Chloride   Date Value Ref Range Status   04/22/2021 105 97 - 108 mmol/L Final   04/21/2021 106 97 - 108 mmol/L Final   04/20/2021 106 97 - 108 mmol/L Final     CO2   Date Value Ref Range Status   04/22/2021 24 22 - 30 mmol/L Final   04/21/2021 23 22 - 30 mmol/L Final   04/20/2021 26 22 - 30 mmol/L Final     BUN   Date Value Ref Range Status   04/22/2021 17 5 - 25 mg/dL Final   04/21/2021 22 5 - 25 mg/dL Final   04/20/2021 25 5 - 25 mg/dL Final     Creatinine   Date Value Ref Range Status   04/22/2021 1 39 0 70 - 1 50 mg/dL Final Comment:     Standardized to IDMS reference method   04/21/2021 1 39 0 70 - 1 50 mg/dL Final     Comment:     Standardized to IDMS reference method   04/20/2021 1 68 (H) 0 70 - 1 50 mg/dL Final     Comment:     Standardized to IDMS reference method     eGFR   Date Value Ref Range Status   04/22/2021 50 (L) >60 ml/min/1 73sq m Final   04/21/2021 50 (L) >60 ml/min/1 73sq m Final   04/20/2021 39 (L) >60 ml/min/1 73sq m Final     Calcium   Date Value Ref Range Status   04/22/2021 9 2 8 4 - 10 2 mg/dL Final   04/21/2021 8 9 8 4 - 10 2 mg/dL Final   04/20/2021 8 5 8 4 - 10 2 mg/dL Final     AST   Date Value Ref Range Status   04/22/2021 26 17 - 59 U/L Final     Comment:     Specimen collection should occur prior to Sulfasalazine administration due to the potential for falsely depressed results  04/20/2021 25 17 - 59 U/L Final     Comment:     Specimen collection should occur prior to Sulfasalazine administration due to the potential for falsely depressed results  03/04/2019 20 17 - 59 U/L Final     Comment:       Specimen collection should occur prior to Sulfasalazine administration due to the potential for falsely depressed results  ALT   Date Value Ref Range Status   04/22/2021 22 <50 U/L Final     Comment:     Specimen collection should occur prior to Sulfasalazine administration due to the potential for falsely depressed results  04/20/2021 31 <50 U/L Final     Comment:     Specimen collection should occur prior to Sulfasalazine administration due to the potential for falsely depressed results  03/04/2019 26 9 - 52 U/L Final     Comment:       Specimen collection should occur prior to Sulfasalazine administration due to the potential for falsely depressed results        Alkaline Phosphatase   Date Value Ref Range Status   04/22/2021 84 43 - 122 U/L Final   04/20/2021 99 43 - 122 U/L Final   03/04/2019 102 43 - 122 U/L Final     Magnesium   Date Value Ref Range Status   04/20/2021 1 9 1 6 - 2 3 mg/dL Final   08/12/2020 1 9 1 6 - 2 3 mg/dL Final     No results found for: TSH    Bernadette Cook MD   PGY-2

## 2021-08-18 ENCOUNTER — VBI (OUTPATIENT)
Dept: ADMINISTRATIVE | Facility: OTHER | Age: 76
End: 2021-08-18

## 2021-08-27 ENCOUNTER — OFFICE VISIT (OUTPATIENT)
Dept: DENTISTRY | Facility: CLINIC | Age: 76
End: 2021-08-27

## 2021-08-27 VITALS — SYSTOLIC BLOOD PRESSURE: 121 MMHG | DIASTOLIC BLOOD PRESSURE: 64 MMHG | TEMPERATURE: 97.5 F | HEART RATE: 75 BPM

## 2021-08-27 DIAGNOSIS — J45.990 EXERCISE-INDUCED ASTHMA: ICD-10-CM

## 2021-08-27 DIAGNOSIS — K08.109 COMPLETE EDENTULISM, UNSPECIFIED EDENTULISM CLASS: Primary | ICD-10-CM

## 2021-08-27 PROCEDURE — WIS5000 PRELIMINARY IMPRESSIONS: Performed by: DENTIST

## 2021-08-27 RX ORDER — ALBUTEROL SULFATE 90 UG/1
2 AEROSOL, METERED RESPIRATORY (INHALATION) EVERY 6 HOURS PRN
Qty: 18 G | Refills: 0 | Status: SHIPPED | OUTPATIENT
Start: 2021-08-27 | End: 2022-04-20 | Stop reason: SDUPTHER

## 2021-08-27 NOTE — TELEPHONE ENCOUNTER
Patient in need of refills for albuterol (PROVENTIL HFA,VENTOLIN HFA) 90 mcg/act inhaler,     Please advise

## 2021-09-07 ENCOUNTER — TELEPHONE (OUTPATIENT)
Dept: NEPHROLOGY | Facility: CLINIC | Age: 76
End: 2021-09-07

## 2021-09-07 LAB — DENTAL LAB ACCEPTED: YES

## 2021-09-07 NOTE — TELEPHONE ENCOUNTER
Left a message reminding patient that he has an upcoming appointment and that labs are needed for this appointment  I left the office number if he has any questions

## 2021-09-13 ENCOUNTER — OFFICE VISIT (OUTPATIENT)
Dept: NEPHROLOGY | Facility: CLINIC | Age: 76
End: 2021-09-13
Payer: COMMERCIAL

## 2021-09-13 VITALS
SYSTOLIC BLOOD PRESSURE: 120 MMHG | WEIGHT: 180 LBS | HEIGHT: 69 IN | DIASTOLIC BLOOD PRESSURE: 63 MMHG | BODY MASS INDEX: 26.66 KG/M2 | HEART RATE: 112 BPM

## 2021-09-13 DIAGNOSIS — N18.31 CKD STAGE G3A/A2, GFR 45-59 AND ALBUMIN CREATININE RATIO 30-299 MG/G (HCC): Primary | ICD-10-CM

## 2021-09-13 DIAGNOSIS — I10 ESSENTIAL HYPERTENSION: ICD-10-CM

## 2021-09-13 DIAGNOSIS — E78.5 HYPERLIPIDEMIA, UNSPECIFIED HYPERLIPIDEMIA TYPE: ICD-10-CM

## 2021-09-13 DIAGNOSIS — R80.1 PERSISTENT PROTEINURIA: ICD-10-CM

## 2021-09-13 DIAGNOSIS — E55.9 VITAMIN D DEFICIENCY: ICD-10-CM

## 2021-09-13 PROCEDURE — 99214 OFFICE O/P EST MOD 30 MIN: CPT | Performed by: INTERNAL MEDICINE

## 2021-09-13 PROCEDURE — 1036F TOBACCO NON-USER: CPT | Performed by: INTERNAL MEDICINE

## 2021-09-13 PROCEDURE — 1160F RVW MEDS BY RX/DR IN RCRD: CPT | Performed by: INTERNAL MEDICINE

## 2021-09-13 PROCEDURE — 3008F BODY MASS INDEX DOCD: CPT | Performed by: INTERNAL MEDICINE

## 2021-09-13 RX ORDER — MULTIVIT-MIN/IRON/FOLIC ACID/K 18-600-40
CAPSULE ORAL DAILY
COMMUNITY

## 2021-09-13 NOTE — PATIENT INSTRUCTIONS
- Please call me in 10 days after having your blood work done to review the results if you do not hear back from me or my office, as I may have not received the results  - please remember to perform blood work prior to the next visit  - Please call if the blood pressure top number is greater than 150 or less than 110 consistently  - Please call if you are gaining more than 2lbs in 2 days for adjustment of water pills   ~ Please AVOID the following pain medications  LIST OF NSAIDS (NONSTEROIDAL ANTI-INFLAMMATORY DRUGS) AND CHRISTIANSON-2 INHIBITORS    DIFLUNISAL (DOLOBID)  IBUPROFEN (MOTRIN, ADVIL)  FLURBIPROFEN (ANSAID)  KETOPROFEN (ORUDIS, ORUVAIL)  FENOPROFEN (NALFON)  NABUMETONE (RELAFEN)  PIROXICAM (FELDENE)  NAPROXEN (ALEVE, NAPROSYN, NAPRELAN, ANAPROX)  DICLOFENAC (VOLTAREN, CATAFLAM)  INDOMETHACIN (INDOCIN)  SULINDAC (CLINORIL)  TOLMETIN (TOLETIN)  ETODOLAC (LODINE)  MELOXICAM (MOBIC)  KETOROLAC (TORADOL)  OXAPROZIN (DAYPRO)  CELECOXIB (CELEBREX)    Phosphorus diet  Follow a low phosphorus diet      Avoid these higher phosphorus foods: Choose these lower phosphorus foods:   Milk, pudding or yogurt (from animals and from many soy varieties) Rice milk (unfortified), nondairy creamer (if it doesn't have terms in the ingredients list that contain the letters "phos")   Hard cheeses, ricotta or cottage cheese, fat-free cream cheese Regular and low-fat cream cheese   Ice cream or frozen yogurt Sherbet or frozen fruit pops   Soups made with higher phosphorus ingredients (milk, dried peas, beans, lentils) Soups made with lower phosphorus ingredients (broth- or water-based with other lower phosphorus ingredients)   Whole grains, including whole-grain breads, crackers, cereal, rice and pasta Refined grains, including white bread, crackers, cereals, rice and pasta   Quick breads, biscuits, cornbread, muffins, pancakes or waffles Homemade refined (white) dinner rolls, bagels or English muffins   Dried peas (split, black-eyed), beans (black, garbanzo, lima, kidney, navy, garcia) or lentils Green peas (canned, frozen), green beans or wax beans   Organ meats, walleye, pollock or sardines Lean beef, pork, lamb, poultry or other fish   Nuts and seeds Popcorn   Peanut butter and other nut butters Jam, jelly or honey   Chocolate, including chocolate drinks Carob (chocolate-flavored) candy, hard candy or gumdrops   Radha and pepper-type sodas, flavored mascorro, bottled teas (if a term in the ingredients list contains the letters "phos") Lemon-lime soda, ginger ale or root beer, plain water     Follow a moderate potassium diet

## 2021-09-13 NOTE — PROGRESS NOTES
Nephrology Follow up Consultation  Jerel Rivera  77 y o  male MRN: [id number]            BACKGROUND:  Jerel Rivera  is a 77 y o male who was referred by Ragini Esposito MD for evaluation of Follow-up and Chronic Kidney Disease    ASSESSMENT / PLAN:   77 y o   male with pmh of multiple co-morbidities including  hypertension  (x 5yrs), COPD, hyperlipidemia and CKD stage 2/3 presents to the office for routine follow-up       1  CKD stage 2/3AA2:  - Patient has a baseline creatinine of 1 2-1 4 mg/dL  Most recent labs show a Creatinine of  1 39  mg/dL on 4/22/21  Renal function remains stable at baseline  Check blood work after the visit  - likely has underlying CKD secondary to age-related nephron loss plus hypertensive nephrosclerosis  - renal ultrasound from 09/11/2019 showed right kidney 10 cm, 2 x 2 cm cyst in the right kidney and a septated cyst in the left kidney  Left kidney size not documented, no hydronephrosis  -renal ultrasound from 08/12/2020 showed bilateral medical renal disease, bilateral renal cysts, left kidney cyst partially septated may repeat ultrasound in 1 year  -  Ordered for repeat ultrasound to be done prior to next visit  - Proteinuria -  protein creatinine ratio of 190 mg as of August 2020  - Acid base and lytes stable   - Recommend to avoid use of NSAIDs, nephrotoxins  Caution advised with regards to exposure to IV contrast dye    - Discussed with the patient in depth his renal status, including the possible etiologies for CKD  - Advised the patient that when his GFR is close to 20mL/min then will start discussing about RRT(renal replacement therapy) options such as renal transplant, peritoneal dialysis and hemodialysis  - Informed the patient about the various options for Renal Replacement therapy    - Discussed with the patient how we need to work together to delay the progression of CKD with optimal BP control based on their age and co-morbidities and trying to reduce proteinuria by the use of anti-proteinuric agents       2  Hypertension:  - Patient is on lisinopril 40 mg po QHS, norvasc 5mg po q24    - Goal BP of <  140/90 based on age and comorbidities  - Instructed to follow low sodium (2gm)diet   - Advised to hold ACEI/ARBs if patient suffers from dehydration due to gastrointestinal losses due to risk of LILLY secondary to failure to autoregulate      3  Hemoglobin:  - Goal Hb of 10-12 g/dL  - Most recent labs suggestive of  12 4 g per dL  - no role for IV iron at this time     4  CKD-MBD(Mineral Bone Disease)/vitamin-D deficiency:  - Based on patients CKD stage following is the goal of therapy  - Maintain calcium phosphorus product of < 55   - Stage 3 CKD - Goal Ca 8 5-10 mg/dL , goal Phos 2 7-4 6 mg/dL  , goal iPTH 30-70 pg/mL  - Patient is currently at goal   -   continue vitamin-D 2000 units p o  Q day   - most recent vitamin-D level of  42 5 and intact PTH of  44 6 as of  November 2020  - check intact PTH and vitamin-D, prior to next visit and after today's visit     5  Lipids:  - goal LDL less than 70  - Management as per PCP     6  Hearing loss:  - management as per Primary team     7  COPD:  - Management as per primary team  - on inhalers     8  Nutrition:  - Encouraged patient to follow a renal diet comprising of moderate potassium, low phosphorus and protein restriction to 0 8gm/kg  - Will check serum albumin with next blood work       9  Followup:  - Patient is to follow-up in 12 months, with lab work to be performed after the visit and then again in a few days prior to the visit  Advised patient to call me in 10 days to review the results if they do not hear back from me, as I may have not received the results  Hernando Bruno MD, FASN, 9/13/2021, 1:47 PM             SUBJECTIVE: 77 y o  male presents to the office for routine follow-up   Feels well has no complaints no recent hospitalizations no issues with edema not taking NSAIDs happy to hear parameters stable agreeable for blood work thankful for all the care information that he has gotten today  Review of Systems   Constitutional: Negative for appetite change, chills, fatigue and fever  HENT: Negative for congestion, rhinorrhea and sore throat  Respiratory: Negative for cough, shortness of breath and wheezing  Cardiovascular: Negative for leg swelling  Gastrointestinal: Negative for abdominal pain, constipation, diarrhea, nausea and vomiting  Genitourinary: Negative for difficulty urinating, dysuria and hematuria  Musculoskeletal: Negative for back pain  Skin: Negative for rash and wound  Neurological: Negative for dizziness and headaches  Psychiatric/Behavioral: Negative for agitation and confusion  All other systems reviewed and are negative  PAST MEDICAL HISTORY:  Past Medical History:   Diagnosis Date    CKD (chronic kidney disease)     Hypertension     Microalbuminuria        PROBLEM LIST    Patient Active Problem List   Diagnosis    Presbycusis    Osteopenia    Essential hypertension    COPD [facility]chronic obstructive pulmonary disease) ([facility]    Tachycardia    Screening for colon cancer    Hyperlipidemia    CKD stage G3a/A2, GFR 45-59 and albumin creatinine ratio  mg/g    Cyst of kidney, acquired    Vitamin D deficiency    Persistent proteinuria       PAST SURGICAL HISTORY:  Past Surgical History:   Procedure Laterality Date    APPENDECTOMY      BEDSIDE SPIROMETRY WITH BRONCHODILATOR PRE/POST  4/21/2021    HERNIA REPAIR      STRABISMUS SURGERY         SOCIAL HISTORY :   reports that he has quit smoking  His smoking use included cigarettes  He started smoking about 2 years ago  He has never used smokeless tobacco  He reports current alcohol use  He reports that he does not use drugs      FAMILY HISTORY:  Family History   Problem Relation Age of Onset    No Known Problems Mother     No Known Problems Father        ALLERGIES:  No Known Allergies        PHYSICAL EXAM:  Vitals:    09/13/21 1252   BP: 120/63   BP Location: Left arm   Patient Position: Sitting   Cuff Size: Standard   Pulse: (!) 112   Weight: 81 6 kg (180 lb)   Height: 5' 9" (1 753 m)     Body mass index is 26 58 kg/m²  Physical Exam  Vitals reviewed  Constitutional:       General: He is not in acute distress  Appearance: Normal appearance  He is normal weight  He is not ill-appearing, toxic-appearing or diaphoretic  HENT:      Head: Normocephalic and atraumatic  Mouth/Throat:      Mouth: Mucous membranes are moist       Pharynx: Oropharynx is clear  No oropharyngeal exudate  Eyes:      General: No scleral icterus  Conjunctiva/sclera: Conjunctivae normal    Cardiovascular:      Rate and Rhythm: Normal rate  Heart sounds: Normal heart sounds  No friction rub  Pulmonary:      Effort: Pulmonary effort is normal  No respiratory distress  Breath sounds: Normal breath sounds  No stridor  No wheezing  Abdominal:      General: There is no distension  Palpations: There is no mass  Tenderness: There is no abdominal tenderness  There is no right CVA tenderness or left CVA tenderness  Musculoskeletal:         General: No swelling  Cervical back: Normal range of motion and neck supple  Skin:     General: Skin is warm  Coloration: Skin is not jaundiced  Neurological:      General: No focal deficit present  Mental Status: He is alert and oriented to person, place, and time  Mental status is at baseline     Psychiatric:         Mood and Affect: Mood normal          Behavior: Behavior normal          LABORATORY DATA:     Results from last 6 Months   Lab Units 04/22/21  0515 04/21/21  0543 04/20/21  1926 04/20/21  1542   WBC Thousand/uL 5 30 6 40  --  8 70   HEMOGLOBIN g/dL 12 4* 12 3*  --  13 4*   HEMATOCRIT % 38 2* 37 5*  --  40 4*   PLATELETS Thousands/uL 285 279  --  311   POTASSIUM mmol/L 4 4 4 5 4 2 4 7   CHLORIDE mmol/L 105 106 106 100   CO2 mmol/L 24 23 26 29   BUN mg/dL 17 22 25 27*   CREATININE mg/dL 1 39 1 39 1 68* 2 14*   CALCIUM mg/dL 9 2 8 9 8 5 9 7   MAGNESIUM mg/dL  --   --   --  1 9   PHOSPHORUS mg/dL  --   --   --  3 1        rest all reviewed    RADIOLOGY:  No orders to display     Rest all reviewed        MEDICATIONS:    Current Outpatient Medications:     albuterol (PROVENTIL HFA,VENTOLIN HFA) 90 mcg/act inhaler, Inhale 2 puffs every 6 (six) hours as needed for wheezing, Disp: 18 g, Rfl: 0    amLODIPine (NORVASC) 5 mg tablet, Take 1 tablet (5 mg total) by mouth daily, Disp: 90 tablet, Rfl: 3    Blood Pressure KIT, Use daily, Disp: 1 kit, Rfl: 0    Cholecalciferol (Vitamin D) 50 MCG (2000 UT) CAPS, Take by mouth daily, Disp: , Rfl:     glycopyrrolate-formoterol (BEVESPI AEROSPHERE) 9-4 8 MCG/ACT inhaler, Inhale 2 puffs 2 (two) times a day, Disp: 10 7 g, Rfl: 3    lisinopril (ZESTRIL) 40 mg tablet, Take 1 tablet (40 mg total) by mouth every evening, Disp: 90 tablet, Rfl: 3    ergocalciferol (ERGOCALCIFEROL) 1 25 MG (82821 UT) capsule, Take 1 capsule (50,000 Units total) by mouth once a week (Patient not taking: Reported on 9/13/2021), Disp: 12 capsule, Rfl: 0          Portions of the record may have been created with voice recognition software  Occasional wrong word or "sound a like" substitutions may have occurred due to the inherent limitations of voice recognition software  Read the chart carefully and recognize, using context, where substitutions have occurred  If you have any questions, please contact the dictating provider

## 2021-09-17 ENCOUNTER — CONSULT (OUTPATIENT)
Dept: DENTISTRY | Facility: CLINIC | Age: 76
End: 2021-09-17

## 2021-09-17 DIAGNOSIS — K08.109 TEETH MISSING: Primary | ICD-10-CM

## 2021-09-17 NOTE — PROGRESS NOTES
Removable    Pt presents for a denture visit and gave verbal consent for treatment:    Reviewed health history - Pt is ASA  2    Treatment consents signed: Yes    Perio: Non applicable    Pain Scale: 0    Caries Assessment: Non applicable     Radiographs: Films are current    Oral Hygiene instruction reviewed    Annual evaluation of soft tissue recommended        Explained denture making process to patient, including the necessary number of visits and timeframe to make denture  Reviewed denture expectations, adjustment period, and hygiene  Upper and lower final  impressions made using custom trays  NV: Bite reg

## 2021-10-13 ENCOUNTER — APPOINTMENT (OUTPATIENT)
Dept: LAB | Facility: CLINIC | Age: 76
End: 2021-10-13
Payer: COMMERCIAL

## 2021-10-13 ENCOUNTER — OFFICE VISIT (OUTPATIENT)
Dept: FAMILY MEDICINE CLINIC | Facility: CLINIC | Age: 76
End: 2021-10-13

## 2021-10-13 VITALS
BODY MASS INDEX: 28.14 KG/M2 | SYSTOLIC BLOOD PRESSURE: 112 MMHG | TEMPERATURE: 97.8 F | WEIGHT: 190 LBS | OXYGEN SATURATION: 96 % | HEIGHT: 69 IN | HEART RATE: 100 BPM | DIASTOLIC BLOOD PRESSURE: 60 MMHG | RESPIRATION RATE: 20 BRPM

## 2021-10-13 DIAGNOSIS — N18.2 CKD (CHRONIC KIDNEY DISEASE) STAGE 2, GFR 60-89 ML/MIN: ICD-10-CM

## 2021-10-13 DIAGNOSIS — N18.31 CKD STAGE G3A/A2, GFR 45-59 AND ALBUMIN CREATININE RATIO 30-299 MG/G (HCC): ICD-10-CM

## 2021-10-13 DIAGNOSIS — E55.9 VITAMIN D DEFICIENCY: ICD-10-CM

## 2021-10-13 DIAGNOSIS — R80.1 PERSISTENT PROTEINURIA: ICD-10-CM

## 2021-10-13 DIAGNOSIS — Z12.11 ENCOUNTER FOR SCREENING COLONOSCOPY: ICD-10-CM

## 2021-10-13 DIAGNOSIS — J44.9 CHRONIC OBSTRUCTIVE PULMONARY DISEASE, UNSPECIFIED COPD TYPE (HCC): ICD-10-CM

## 2021-10-13 DIAGNOSIS — N28.1 CYST OF KIDNEY, ACQUIRED: ICD-10-CM

## 2021-10-13 DIAGNOSIS — Z23 ENCOUNTER FOR IMMUNIZATION: Primary | ICD-10-CM

## 2021-10-13 DIAGNOSIS — I10 ESSENTIAL HYPERTENSION: ICD-10-CM

## 2021-10-13 LAB — 25(OH)D3 SERPL-MCNC: 57.2 NG/ML (ref 30–100)

## 2021-10-13 PROCEDURE — 99213 OFFICE O/P EST LOW 20 MIN: CPT | Performed by: FAMILY MEDICINE

## 2021-10-13 PROCEDURE — 1036F TOBACCO NON-USER: CPT | Performed by: FAMILY MEDICINE

## 2021-10-13 PROCEDURE — 82306 VITAMIN D 25 HYDROXY: CPT

## 2021-10-13 PROCEDURE — 3008F BODY MASS INDEX DOCD: CPT | Performed by: INTERNAL MEDICINE

## 2021-10-13 PROCEDURE — G0008 ADMIN INFLUENZA VIRUS VAC: HCPCS | Performed by: FAMILY MEDICINE

## 2021-10-13 PROCEDURE — 36415 COLL VENOUS BLD VENIPUNCTURE: CPT

## 2021-10-13 PROCEDURE — 3008F BODY MASS INDEX DOCD: CPT | Performed by: FAMILY MEDICINE

## 2021-10-13 PROCEDURE — 90662 IIV NO PRSV INCREASED AG IM: CPT | Performed by: FAMILY MEDICINE

## 2021-10-13 PROCEDURE — 1160F RVW MEDS BY RX/DR IN RCRD: CPT | Performed by: FAMILY MEDICINE

## 2021-10-13 PROCEDURE — 3074F SYST BP LT 130 MM HG: CPT | Performed by: FAMILY MEDICINE

## 2021-10-13 PROCEDURE — 3078F DIAST BP <80 MM HG: CPT | Performed by: FAMILY MEDICINE

## 2021-10-13 RX ORDER — AMLODIPINE BESYLATE 5 MG/1
5 TABLET ORAL DAILY
Qty: 90 TABLET | Refills: 3 | Status: SHIPPED | OUTPATIENT
Start: 2021-10-13 | End: 2022-04-20 | Stop reason: SDUPTHER

## 2021-10-13 RX ORDER — LISINOPRIL 40 MG/1
40 TABLET ORAL EVERY EVENING
Qty: 90 TABLET | Refills: 3 | Status: SHIPPED | OUTPATIENT
Start: 2021-10-13 | End: 2022-04-20 | Stop reason: SDUPTHER

## 2021-11-10 ENCOUNTER — CONSULT (OUTPATIENT)
Dept: SURGERY | Facility: CLINIC | Age: 76
End: 2021-11-10
Payer: COMMERCIAL

## 2021-11-10 ENCOUNTER — PREP FOR PROCEDURE (OUTPATIENT)
Dept: SURGERY | Facility: CLINIC | Age: 76
End: 2021-11-10

## 2021-11-10 VITALS
TEMPERATURE: 97.3 F | BODY MASS INDEX: 28.95 KG/M2 | WEIGHT: 191 LBS | HEIGHT: 68 IN | HEART RATE: 78 BPM | SYSTOLIC BLOOD PRESSURE: 124 MMHG | DIASTOLIC BLOOD PRESSURE: 68 MMHG

## 2021-11-10 DIAGNOSIS — Z12.11 ENCOUNTER FOR SCREENING COLONOSCOPY: Primary | ICD-10-CM

## 2021-11-10 PROCEDURE — 99204 OFFICE O/P NEW MOD 45 MIN: CPT | Performed by: SURGERY

## 2021-11-10 PROCEDURE — 3074F SYST BP LT 130 MM HG: CPT | Performed by: SURGERY

## 2021-11-10 PROCEDURE — 3008F BODY MASS INDEX DOCD: CPT | Performed by: FAMILY MEDICINE

## 2021-11-10 PROCEDURE — 3008F BODY MASS INDEX DOCD: CPT | Performed by: SURGERY

## 2021-11-10 PROCEDURE — 3078F DIAST BP <80 MM HG: CPT | Performed by: SURGERY

## 2021-11-10 RX ORDER — POLYETHYLENE GLYCOL 3350 17 G/17G
238 POWDER, FOR SOLUTION ORAL SEE ADMIN INSTRUCTIONS
Qty: 238 G | Refills: 0 | Status: SHIPPED | OUTPATIENT
Start: 2021-11-10 | End: 2022-04-27 | Stop reason: HOSPADM

## 2022-01-18 PROBLEM — Z00.00 HEALTHCARE MAINTENANCE: Status: ACTIVE | Noted: 2022-01-18

## 2022-01-19 ENCOUNTER — LAB (OUTPATIENT)
Dept: LAB | Facility: CLINIC | Age: 77
End: 2022-01-19
Payer: MEDICARE

## 2022-01-19 ENCOUNTER — OFFICE VISIT (OUTPATIENT)
Dept: FAMILY MEDICINE CLINIC | Facility: CLINIC | Age: 77
End: 2022-01-19

## 2022-01-19 VITALS
TEMPERATURE: 97.6 F | OXYGEN SATURATION: 95 % | WEIGHT: 185 LBS | SYSTOLIC BLOOD PRESSURE: 134 MMHG | DIASTOLIC BLOOD PRESSURE: 60 MMHG | BODY MASS INDEX: 28.04 KG/M2 | HEART RATE: 103 BPM | HEIGHT: 68 IN

## 2022-01-19 DIAGNOSIS — Z11.59 ENCOUNTER FOR HEPATITIS C SCREENING TEST FOR LOW RISK PATIENT: ICD-10-CM

## 2022-01-19 DIAGNOSIS — J44.1 CHRONIC OBSTRUCTIVE PULMONARY DISEASE WITH ACUTE EXACERBATION (HCC): ICD-10-CM

## 2022-01-19 DIAGNOSIS — Z00.00 HEALTHCARE MAINTENANCE: ICD-10-CM

## 2022-01-19 DIAGNOSIS — J44.9 CHRONIC OBSTRUCTIVE PULMONARY DISEASE, UNSPECIFIED COPD TYPE (HCC): Primary | ICD-10-CM

## 2022-01-19 DIAGNOSIS — N18.31 CKD STAGE G3A/A2, GFR 45-59 AND ALBUMIN CREATININE RATIO 30-299 MG/G (HCC): ICD-10-CM

## 2022-01-19 DIAGNOSIS — I10 ESSENTIAL HYPERTENSION: ICD-10-CM

## 2022-01-19 PROCEDURE — 86803 HEPATITIS C AB TEST: CPT

## 2022-01-19 PROCEDURE — 94640 AIRWAY INHALATION TREATMENT: CPT | Performed by: FAMILY MEDICINE

## 2022-01-19 PROCEDURE — 36415 COLL VENOUS BLD VENIPUNCTURE: CPT

## 2022-01-19 PROCEDURE — 99213 OFFICE O/P EST LOW 20 MIN: CPT | Performed by: FAMILY MEDICINE

## 2022-01-19 RX ORDER — AZITHROMYCIN 250 MG/1
TABLET, FILM COATED ORAL
Qty: 6 TABLET | Refills: 0 | Status: SHIPPED | OUTPATIENT
Start: 2022-01-19 | End: 2022-01-24

## 2022-01-19 RX ORDER — IPRATROPIUM BROMIDE AND ALBUTEROL SULFATE 2.5; .5 MG/3ML; MG/3ML
3 SOLUTION RESPIRATORY (INHALATION) ONCE
Status: COMPLETED | OUTPATIENT
Start: 2022-01-19 | End: 2022-01-19

## 2022-01-19 RX ORDER — PREDNISONE 20 MG/1
40 TABLET ORAL DAILY
Qty: 10 TABLET | Refills: 0 | Status: SHIPPED | OUTPATIENT
Start: 2022-01-19 | End: 2022-01-24

## 2022-01-19 RX ORDER — IPRATROPIUM BROMIDE AND ALBUTEROL SULFATE 2.5; .5 MG/3ML; MG/3ML
3 SOLUTION RESPIRATORY (INHALATION)
Status: DISCONTINUED | OUTPATIENT
Start: 2022-01-19 | End: 2022-01-19

## 2022-01-19 RX ADMIN — IPRATROPIUM BROMIDE AND ALBUTEROL SULFATE 3 ML: 2.5; .5 SOLUTION RESPIRATORY (INHALATION) at 14:05

## 2022-01-19 NOTE — ASSESSMENT & PLAN NOTE
· Follows with Nephrology  · Baseline Cr 1 2-1 4  · Renal US 8/2020- bilateral medical renal disease, bilateral renal cysts, left kidney cyst  · Advised to get labs Nephro wanted at last visit   Will go after visit

## 2022-01-19 NOTE — PROGRESS NOTES
[facility]   [address] [facility] [facility] , [facility], [address]  Phone#  [phone]  Fax#  [phone]    ASSESSMENT and PLAN  Chronic obstructive pulmonary disease with acute exacerbation (HCC)  · In mild acute exacerbation as evidenced by mild productive cough with whitish, mild wheezing  No evidence of significant dyspnea at rest  Oxygen 95% on room air   · O2- 95% on room air  · Physical exam with wheezing bilateral upper lobes   · Continue home Bevespi 2 puff BID with Albuterol prn  · S/p 1 Duo-Neb in office; significant improvement of wheezing   · Will start Prednisone 40 mg qd x 5 days  · Will start Azithromycin 500 mg x 1 day, 250 mg x 4 days   · ER precautions given for any worsening shortness of breath, chest pain, vision changes  Essential hypertension  · Current /60, Goal BP <140/90 per JNC 8 guidelines, controlled  · Current Home Medications: Lisinopril 40 mg, Amlodipine 5 mg   · Per nephro: hold ACEI if dehydration due to GI losses due to risk of LILLY secondary to failure to autoregulate  No evidence of dehydration  · Statin currently used: none, will discuss starting on Statin at next visit  , ASCVD Risk- 26 9  · Continue Lisinopril 40 mg, Amlodipine 5 mg   · Discussion about starting Statin which will reduce his ASCVD to about 1/3  Will continue discuss after Lipid Panel       CKD stage G3a/A2, GFR 45-59 and albumin creatinine ratio  mg/g  · Follows with Nephrology  · Baseline Cr 1 2-1 4  · Renal US 8/2020- bilateral medical renal disease, bilateral renal cysts, left kidney cyst  · Advised to get labs Nephro wanted at last visit  Will go after visit     Healthcare maintenance  · Appointment for colonoscopy in March   Directed to pharmacy to  bowel prep and advised to take the night before  · COVID booster patient will schedule in the next couple of weeks   · RTC in 3 months for Medicare Annual Wellness Visit    Diagnoses and all orders for this visit:    Chronic obstructive pulmonary disease, unspecified COPD type ([facility] )  -     Discontinue: ipratropium-albuterol (DUO-NEB) 0 5-2 5 mg/3 mL inhalation solution 3 mL  -     predniSONE 20 mg tablet; Take 2 tablets (40 mg total) by mouth daily for 5 days  -     azithromycin (ZITHROMAX) 250 mg tablet; Take 2 tablets (500 mg total) by mouth every 24 hours for 1 day, THEN 1 tablet (250 mg total) every 24 hours for 4 days  -     ipratropium-albuterol (DUO-NEB) 0 5-2 5 mg/3 mL inhalation solution 3 mL  -     glycopyrrolate-formoterol (BEVESPI AEROSPHERE) 9-4 8 MCG/ACT inhaler; Inhale 2 puffs 2 (two) times a day    Essential hypertension    CKD stage G3a/A2, GFR 45-59 and albumin creatinine ratio  mg/g    Healthcare maintenance    Encounter for hepatitis C screening test for low risk patient  -     Hepatitis C antibody; Future    Chronic obstructive pulmonary disease with acute exacerbation ([place] [id number] )        HISTORY OF PRESENT ILLNESS  Edwin Aparicio  is a 77 y o  male with a significant PMHx of HTN, COPD, CKD Stage II, who presents to the clinic for  management of their chronic medical conditions  Patient's medical conditions are stable unless noted otherwise above  Patient has not had any recent hospitalizations, or medical emergencies since last visit  Patient has no further complaints other than what is mentioned in the ROS  Smoking/Alcohol/Illicit Drug Use: quit smoking 4 years prior     REVIEW OF SYSTEMS  Review of Systems   Constitutional: Negative for chills, fatigue, fever and unexpected weight change  HENT: Negative for congestion, rhinorrhea, sinus pressure and sore throat  Eyes: Negative for visual disturbance  Respiratory: Positive for cough  Negative for chest tightness, shortness of breath and wheezing  Cardiovascular: Negative for chest pain  Gastrointestinal: Negative for abdominal distention, abdominal pain, blood in stool, constipation, diarrhea, nausea and vomiting  Genitourinary: Negative for difficulty urinating, dysuria, frequency, hematuria and urgency  Musculoskeletal: Negative for back pain and neck pain  Skin: Negative for rash  Allergic/Immunologic: Negative  Neurological: Negative for dizziness, weakness, light-headedness, numbness and headaches  Psychiatric/Behavioral: Negative for behavioral problems  PAST MEDICAL HISTORY   Past Medical History:   Diagnosis Date    CKD (chronic kidney disease)     Hypertension     Microalbuminuria      Past Surgical History:   Procedure Laterality Date    APPENDECTOMY      BEDSIDE SPIROMETRY WITH BRONCHODILATOR PRE/POST  4/21/2021    HERNIA REPAIR      STRABISMUS SURGERY       Social History     Socioeconomic History    Marital status: Single     Spouse name: Not on file    Number of children: Not on file    Years of education: Not on file    Highest education level: Not on file   Occupational History    Not on file   Tobacco Use    Smoking status: Former Smoker     Types: Cigarettes     Start date: 12/18/2018    Smokeless tobacco: Never Used   Vaping Use    Vaping Use: Never used   Substance and Sexual Activity    Alcohol use: Yes     Comment: Rarely; once a year    Drug use: Never    Sexual activity: Not on file   Other Topics Concern    Not on file   Social History Narrative    Not on file     Social Determinants of Health     Financial Resource Strain: Low Risk     Difficulty of Paying Living Expenses: Not hard at all   Food Insecurity: No Food Insecurity    Worried About Running Out of Food in the Last Year: Never true    Pepe of Food in the Last Year: Never true   Transportation Needs: No Transportation Needs    Lack of Transportation (Medical): No    Lack of Transportation (Non-Medical):  No   Physical Activity: Not on file   Stress: Not on file   Social Connections: Not on file   Intimate Partner Violence: Not on file   Housing Stability: Not on file     Family History   Problem Relation Age of Onset    No Known Problems Mother     No Known Problems Father        MEDICATIONS    Current Outpatient Medications:     albuterol (PROVENTIL HFA,VENTOLIN HFA) 90 mcg/act inhaler, Inhale 2 puffs every 6 (six) hours as needed for wheezing, Disp: 18 g, Rfl: 0    amLODIPine (NORVASC) 5 mg tablet, Take 1 tablet (5 mg total) by mouth daily, Disp: 90 tablet, Rfl: 3    azithromycin (ZITHROMAX) 250 mg tablet, Take 2 tablets (500 mg total) by mouth every 24 hours for 1 day, THEN 1 tablet (250 mg total) every 24 hours for 4 days  , Disp: 6 tablet, Rfl: 0    bisacodyl (DULCOLAX) 5 mg EC tablet, Take 1 tablet (5 mg total) by mouth see administration instructions Take 2 tablets at 12:00 p m  on day prior to colonoscopy  Take 2 tablets at 4:00 p m  on day prior to colonoscopy, Disp: 4 tablet, Rfl: 0    Blood Pressure KIT, Use daily, Disp: 1 kit, Rfl: 0    Cholecalciferol (Vitamin D) 50 MCG (2000 UT) CAPS, Take by mouth daily, Disp: , Rfl:     ergocalciferol (ERGOCALCIFEROL) 1 25 MG (51294 UT) capsule, Take 1 capsule (50,000 Units total) by mouth once a week (Patient not taking: Reported on 9/13/2021), Disp: 12 capsule, Rfl: 0    glycopyrrolate-formoterol (BEVESPI AEROSPHERE) 9-4 8 MCG/ACT inhaler, Inhale 2 puffs 2 (two) times a day, Disp: 10 7 g, Rfl: 3    lisinopril (ZESTRIL) 40 mg tablet, Take 1 tablet (40 mg total) by mouth every evening, Disp: 90 tablet, Rfl: 3    polyethylene glycol (GLYCOLAX) 17 GM/SCOOP powder, Take 238 g by mouth see administration instructions Mix 238 g with 64 oz of clear liquid  Drink half starting at noon on the day prior to colonoscopy    Drink remaining half 6 hours prior to procedure on day of colonoscopy , Disp: 238 g, Rfl: 0    predniSONE 20 mg tablet, Take 2 tablets (40 mg total) by mouth daily for 5 days, Disp: 10 tablet, Rfl: 0    Current Facility-Administered Medications:     ipratropium-albuterol (DUO-NEB) 0 5-2 5 mg/3 mL inhalation solution 3 mL, 3 mL, Nebulization, Once, Filiberto Sun MD    PHYSICAL EXAM  Vitals:    01/19/22 1258 01/19/22 1404   BP: 134/60    BP Location: Left arm    Patient Position: Sitting    Cuff Size: Standard    Pulse: (!) 137 103   Temp: 97 6 °F (36 4 °C)    TempSrc: Probe    SpO2: 95%    Weight: 83 9 kg (185 lb)    Height: 5' 8" (1 727 m)      Wt Readings from Last 3 Encounters:   01/19/22 83 9 kg (185 lb)   11/10/21 86 6 kg (191 lb)   10/13/21 86 2 kg (190 lb)    , Body mass index is 28 13 kg/m²  Physical Exam  Vitals and nursing note reviewed  Constitutional:       General: He is not in acute distress  Appearance: He is well-developed  He is not toxic-appearing  HENT:      Head: Normocephalic and atraumatic  Eyes:      Extraocular Movements: Extraocular movements intact  Pupils: Pupils are equal, round, and reactive to light  Cardiovascular:      Rate and Rhythm: Normal rate and regular rhythm  Heart sounds: Normal heart sounds  No murmur heard  Pulmonary:      Effort: Pulmonary effort is normal  No respiratory distress  Breath sounds: Wheezing (mild bilateral upper ) present  No rhonchi or rales  Abdominal:      General: Bowel sounds are normal  There is no distension  Palpations: Abdomen is soft  Tenderness: There is no abdominal tenderness  There is no guarding  Musculoskeletal:         General: Normal range of motion  Cervical back: Normal range of motion and neck supple  Skin:     General: Skin is warm and dry  Neurological:      Mental Status: He is alert and oriented to person, place, and time     Psychiatric:         Behavior: Behavior normal        LABS/IMAGING  Hemoglobin A1C   Date Value Ref Range Status   03/04/2019 5 9 4 2 - 6 3 % Final     HDL, Direct   Date Value Ref Range Status   03/04/2019 37 (L) 40 - 59 mg/dL Final     Comment:       HDL Cholesterol:       High    >60 mg/dL       Low     <41 mg/dL  Specimen collection should occur prior to Metamizole administration due to the potential for falsley depressed results  Triglycerides   Date Value Ref Range Status   03/04/2019 102 <150 mg/dL Final     Comment:       Triglyceride:     Normal          <150 mg/dl     Borderline High 150-199 mg/dl     High            200-499 mg/dl        Very High       >499 mg/dl    Specimen collection should occur prior to N-Acetylcysteine or Metamizole administration due to the potential for falsely depressed results        WBC   Date Value Ref Range Status   04/22/2021 5 30 4 50 - 11 00 Thousand/uL Final   04/21/2021 6 40 4 50 - 11 00 Thousand/uL Final   04/20/2021 8 70 4 50 - 11 00 Thousand/uL Final     Hemoglobin   Date Value Ref Range Status   04/22/2021 12 4 (L) 13 5 - 17 5 g/dL Final   04/21/2021 12 3 (L) 13 5 - 17 5 g/dL Final   04/20/2021 13 4 (L) 13 5 - 17 5 g/dL Final     Platelets   Date Value Ref Range Status   04/22/2021 285 150 - 450 Thousands/uL Final   04/21/2021 279 150 - 450 Thousands/uL Final   04/20/2021 311 150 - 450 Thousands/uL Final     Potassium   Date Value Ref Range Status   04/22/2021 4 4 3 6 - 5 0 mmol/L Final   04/21/2021 4 5 3 6 - 5 0 mmol/L Final   04/20/2021 4 2 3 6 - 5 0 mmol/L Final     Chloride   Date Value Ref Range Status   04/22/2021 105 97 - 108 mmol/L Final   04/21/2021 106 97 - 108 mmol/L Final   04/20/2021 106 97 - 108 mmol/L Final     CO2   Date Value Ref Range Status   04/22/2021 24 22 - 30 mmol/L Final   04/21/2021 23 22 - 30 mmol/L Final   04/20/2021 26 22 - 30 mmol/L Final     BUN   Date Value Ref Range Status   04/22/2021 17 5 - 25 mg/dL Final   04/21/2021 22 5 - 25 mg/dL Final   04/20/2021 25 5 - 25 mg/dL Final     Creatinine   Date Value Ref Range Status   04/22/2021 1 39 0 70 - 1 50 mg/dL Final     Comment:     Standardized to IDMS reference method   04/21/2021 1 39 0 70 - 1 50 mg/dL Final     Comment:     Standardized to IDMS reference method   04/20/2021 1 68 (H) 0 70 - 1 50 mg/dL Final     Comment:     Standardized to IDMS reference method     eGFR   Date Value Ref Range Status   04/22/2021 50 (L) >60 ml/min/1 73sq m Final   04/21/2021 50 (L) >60 ml/min/1 73sq m Final   04/20/2021 39 (L) >60 ml/min/1 73sq m Final     Calcium   Date Value Ref Range Status   04/22/2021 9 2 8 4 - 10 2 mg/dL Final   04/21/2021 8 9 8 4 - 10 2 mg/dL Final   04/20/2021 8 5 8 4 - 10 2 mg/dL Final     AST   Date Value Ref Range Status   04/22/2021 26 17 - 59 U/L Final     Comment:     Specimen collection should occur prior to Sulfasalazine administration due to the potential for falsely depressed results  04/20/2021 25 17 - 59 U/L Final     Comment:     Specimen collection should occur prior to Sulfasalazine administration due to the potential for falsely depressed results  03/04/2019 20 17 - 59 U/L Final     Comment:       Specimen collection should occur prior to Sulfasalazine administration due to the potential for falsely depressed results  ALT   Date Value Ref Range Status   04/22/2021 22 <50 U/L Final     Comment:     Specimen collection should occur prior to Sulfasalazine administration due to the potential for falsely depressed results  04/20/2021 31 <50 U/L Final     Comment:     Specimen collection should occur prior to Sulfasalazine administration due to the potential for falsely depressed results  03/04/2019 26 9 - 52 U/L Final     Comment:       Specimen collection should occur prior to Sulfasalazine administration due to the potential for falsely depressed results  Alkaline Phosphatase   Date Value Ref Range Status   04/22/2021 84 43 - 122 U/L Final   04/20/2021 99 43 - 122 U/L Final   03/04/2019 102 43 - 122 U/L Final     Magnesium   Date Value Ref Range Status   04/20/2021 1 9 1 6 - 2 3 mg/dL Final   08/12/2020 1 9 1 6 - 2 3 mg/dL Final     No results found for: TSH    This note has been dictated using Mobi[employer] software  It may contain errors, including improperly dictated words   Please contact physician directly for any questions       Bhavani Cramer MD   PGY-2

## 2022-01-19 NOTE — ASSESSMENT & PLAN NOTE
· Appointment for colonoscopy in March   Directed to pharmacy to  bowel prep and advised to take the night before  · COVID booster patient will schedule in the next couple of weeks   · RTC in 3 months for [facility] Annual Wellness Visit

## 2022-01-19 NOTE — ASSESSMENT & PLAN NOTE
· Current /60, Goal BP <140/90 per JNC 8 guidelines, controlled  · Current Home Medications: Lisinopril 40 mg, Amlodipine 5 mg   · Per nephro: hold ACEI if dehydration due to GI losses due to risk of LILLY secondary to failure to autoregulate  No evidence of dehydration  · Statin currently used: none, will discuss starting on Statin at next visit  , ASCVD Risk- 26 9  · Continue Lisinopril 40 mg, Amlodipine 5 mg   · Discussion about starting Statin which will reduce his ASCVD to about 1/3   Will continue discuss after Lipid Panel

## 2022-01-19 NOTE — PATIENT INSTRUCTIONS
Take Prednisone 40 mg every day for 5 days  Take Azithromycin 500 mg once and the 250 mg every day for 4 more days  For any worsening shortness of breath, chest pain, vision changes go to the Emergency Department

## 2022-01-19 NOTE — ASSESSMENT & PLAN NOTE
· In mild acute exacerbation as evidenced by mild productive cough with whitish, mild wheezing  No evidence of significant dyspnea at rest  Oxygen 95% on room air   · O2- 95% on room air  · Physical exam with wheezing bilateral upper lobes   · Continue home Bevespi 2 puff BID with Albuterol prn  · S/p 1 Duo-Neb in office; significant improvement of wheezing   · Will start Prednisone 40 mg qd x 5 days  · Will start Azithromycin 500 mg x 1 day, 250 mg x 4 days   · ER precautions given for any worsening shortness of breath, chest pain, vision changes

## 2022-01-20 LAB — HCV AB SER QL: NORMAL

## 2022-02-15 ENCOUNTER — OFFICE VISIT (OUTPATIENT)
Dept: DENTISTRY | Facility: CLINIC | Age: 77
End: 2022-02-15

## 2022-02-15 VITALS — TEMPERATURE: 97.5 F | SYSTOLIC BLOOD PRESSURE: 142 MMHG | HEART RATE: 105 BPM | DIASTOLIC BLOOD PRESSURE: 76 MMHG

## 2022-02-15 DIAGNOSIS — Z01.20 ENCOUNTER FOR DENTAL EXAMINATION: Primary | ICD-10-CM

## 2022-02-15 PROCEDURE — WIS5002 BITE RIMS: Performed by: DENTIST

## 2022-02-15 NOTE — PROGRESS NOTES
PPTC for jaw relations with bite rims for C/C  No CC at this visit  Tried in maxillary bite rim - occlusal and buccal wax rim had to be reduced to achieve proper height and contour  Midline marked and occlusion is parallel to interpupillary line  Repeated bite was achieved in pt's natural occlusion and a bite registration was taken  Pt reported comfortable bite, not too overextended or uncomfortable  Maxillary wax rim with casts will be sent to lab to set teeth for wax-try in  Pt selected shade A2 with mirror and confirmed shade  Informed pt the fit of the dentures will not be ideal due to condition of bone and lack of bone support  Pt understood limitations of dentures and that adhesive will have to be used to retain dentures      Dr Anahi Freire     NV: wax try in

## 2022-03-03 ENCOUNTER — TELEPHONE (OUTPATIENT)
Dept: PREADMISSION TESTING | Facility: HOSPITAL | Age: 77
End: 2022-03-03

## 2022-03-31 ENCOUNTER — OFFICE VISIT (OUTPATIENT)
Dept: DENTISTRY | Facility: CLINIC | Age: 77
End: 2022-03-31

## 2022-03-31 DIAGNOSIS — K08.109 EDENTULOUS: Primary | ICD-10-CM

## 2022-03-31 DIAGNOSIS — Z01.20 ENCOUNTER FOR DENTAL EXAMINATION: ICD-10-CM

## 2022-03-31 PROCEDURE — WIS5003 WAX TRY-IN: Performed by: DENTIST

## 2022-04-01 NOTE — PROGRESS NOTES
PPTC for wax-try in for upper/lower complete dentures  No CC at this visit  Tried in upper/lower dentures  Pt very pleased with overall esthetics and look of dentures  Mandibular denture had excellent fit and retention  Had to use Fixodent to retain maxillary denture  Maxillary denture may be overextended in buccal flange area - will reduce at delivery appointment  Took some time, but was eventually able to have pt occlude in centric relation  Informed pt it may take time to get used to the occlusion as he has been edentulous for quite some time and lost VDO  Pt understood  Once again informed pt the fit of the dentures will not be ideal due to condition of bone and lack of bone support  Pt understood limitations of dentures and that adhesive will have to be used to retain dentures      NV: deliver C/C

## 2022-04-20 ENCOUNTER — OFFICE VISIT (OUTPATIENT)
Dept: FAMILY MEDICINE CLINIC | Facility: CLINIC | Age: 77
End: 2022-04-20

## 2022-04-20 VITALS
HEART RATE: 95 BPM | BODY MASS INDEX: 27.83 KG/M2 | RESPIRATION RATE: 20 BRPM | DIASTOLIC BLOOD PRESSURE: 64 MMHG | WEIGHT: 183 LBS | SYSTOLIC BLOOD PRESSURE: 130 MMHG | TEMPERATURE: 97.3 F | OXYGEN SATURATION: 95 %

## 2022-04-20 DIAGNOSIS — J44.9 CHRONIC OBSTRUCTIVE PULMONARY DISEASE, UNSPECIFIED COPD TYPE (HCC): ICD-10-CM

## 2022-04-20 DIAGNOSIS — J45.990 EXERCISE-INDUCED ASTHMA: ICD-10-CM

## 2022-04-20 DIAGNOSIS — N18.2 CKD (CHRONIC KIDNEY DISEASE) STAGE 2, GFR 60-89 ML/MIN: ICD-10-CM

## 2022-04-20 DIAGNOSIS — J44.1 CHRONIC OBSTRUCTIVE PULMONARY DISEASE WITH ACUTE EXACERBATION (HCC): Primary | ICD-10-CM

## 2022-04-20 DIAGNOSIS — I10 ESSENTIAL HYPERTENSION: ICD-10-CM

## 2022-04-20 PROCEDURE — G0439 PPPS, SUBSEQ VISIT: HCPCS | Performed by: FAMILY MEDICINE

## 2022-04-20 RX ORDER — ALBUTEROL SULFATE 90 UG/1
2 AEROSOL, METERED RESPIRATORY (INHALATION) EVERY 6 HOURS PRN
Qty: 18 G | Refills: 0 | Status: SHIPPED | OUTPATIENT
Start: 2022-04-20 | End: 2022-08-01 | Stop reason: SDUPTHER

## 2022-04-20 RX ORDER — LISINOPRIL 40 MG/1
40 TABLET ORAL EVERY EVENING
Qty: 90 TABLET | Refills: 3 | Status: SHIPPED | OUTPATIENT
Start: 2022-04-20

## 2022-04-20 RX ORDER — AMLODIPINE BESYLATE 5 MG/1
5 TABLET ORAL DAILY
Qty: 90 TABLET | Refills: 3 | Status: SHIPPED | OUTPATIENT
Start: 2022-04-20

## 2022-04-20 NOTE — PATIENT INSTRUCTIONS

## 2022-04-22 NOTE — ASSESSMENT & PLAN NOTE
· Current /60, Goal BP <140/90 per JNC 8 guidelines, controlled  · Current Home Medications: Lisinopril 40 mg, Amlodipine 5 mg   · Per nephro: hold ACEI if dehydration due to GI losses due to risk of LILLY secondary to failure to autoregulate   No evidence of dehydration  · Statin currently used: none, will discuss starting on Statin at next visit  , ASCVD Risk- 26 9  · Continue Lisinopril 40 mg, Amlodipine 5 mg

## 2022-04-22 NOTE — ASSESSMENT & PLAN NOTE
· Not in exacerbation   · Previous outpatient exacerbation 1/2022 treated with Prednisone and Azithromycin   · O2- 95% on room air  · Physical exam with wheezing bilateral upper lobes   · Continue home Bevespi 2 puff BID with Albuterol prn

## 2022-04-27 ENCOUNTER — ANESTHESIA (OUTPATIENT)
Dept: GASTROENTEROLOGY | Facility: HOSPITAL | Age: 77
End: 2022-04-27

## 2022-04-27 ENCOUNTER — HOSPITAL ENCOUNTER (OUTPATIENT)
Dept: GASTROENTEROLOGY | Facility: HOSPITAL | Age: 77
Setting detail: OUTPATIENT SURGERY
Discharge: HOME | End: 2022-04-27
Attending: SURGERY | Admitting: SURGERY
Payer: MEDICARE

## 2022-04-27 ENCOUNTER — ANESTHESIA EVENT (OUTPATIENT)
Dept: GASTROENTEROLOGY | Facility: HOSPITAL | Age: 77
End: 2022-04-27

## 2022-04-27 VITALS
TEMPERATURE: 97.1 F | WEIGHT: 180 LBS | HEIGHT: 68 IN | BODY MASS INDEX: 27.28 KG/M2 | OXYGEN SATURATION: 94 % | DIASTOLIC BLOOD PRESSURE: 62 MMHG | RESPIRATION RATE: 16 BRPM | SYSTOLIC BLOOD PRESSURE: 100 MMHG | HEART RATE: 62 BPM

## 2022-04-27 DIAGNOSIS — Z12.11 ENCOUNTER FOR SCREENING COLONOSCOPY: ICD-10-CM

## 2022-04-27 PROCEDURE — 45385 COLONOSCOPY W/LESION REMOVAL: CPT | Performed by: SURGERY

## 2022-04-27 PROCEDURE — 88305 TISSUE EXAM BY PATHOLOGIST: CPT | Performed by: PATHOLOGY

## 2022-04-27 RX ORDER — PROPOFOL 10 MG/ML
INJECTION, EMULSION INTRAVENOUS AS NEEDED
Status: DISCONTINUED | OUTPATIENT
Start: 2022-04-27 | End: 2022-04-27

## 2022-04-27 RX ORDER — LIDOCAINE HYDROCHLORIDE 10 MG/ML
INJECTION, SOLUTION EPIDURAL; INFILTRATION; INTRACAUDAL; PERINEURAL AS NEEDED
Status: DISCONTINUED | OUTPATIENT
Start: 2022-04-27 | End: 2022-04-27

## 2022-04-27 RX ORDER — SODIUM CHLORIDE 9 MG/ML
100 INJECTION, SOLUTION INTRAVENOUS CONTINUOUS
Status: DISCONTINUED | OUTPATIENT
Start: 2022-04-27 | End: 2022-05-01 | Stop reason: HOSPADM

## 2022-04-27 RX ADMIN — PROPOFOL 130 MG: 10 INJECTION, EMULSION INTRAVENOUS at 10:54

## 2022-04-27 RX ADMIN — LIDOCAINE HYDROCHLORIDE 50 MG: 10 INJECTION, SOLUTION EPIDURAL; INFILTRATION; INTRACAUDAL; PERINEURAL at 10:54

## 2022-04-27 RX ADMIN — PROPOFOL 20 MG: 10 INJECTION, EMULSION INTRAVENOUS at 11:01

## 2022-04-27 RX ADMIN — PROPOFOL 30 MG: 10 INJECTION, EMULSION INTRAVENOUS at 10:58

## 2022-04-27 RX ADMIN — PROPOFOL 50 MG: 10 INJECTION, EMULSION INTRAVENOUS at 11:07

## 2022-04-27 RX ADMIN — SODIUM CHLORIDE 100 ML/HR: 0.9 INJECTION, SOLUTION INTRAVENOUS at 09:11

## 2022-04-27 RX ADMIN — PROPOFOL 30 MG: 10 INJECTION, EMULSION INTRAVENOUS at 11:04

## 2022-04-27 RX ADMIN — PROPOFOL 40 MG: 10 INJECTION, EMULSION INTRAVENOUS at 11:00

## 2022-04-27 NOTE — ANESTHESIA POSTPROCEDURE EVALUATION
Post-Op Assessment Note    CV Status:  Stable  Pain Score: 0    Pain management: adequate     Mental Status:  Alert and awake   Hydration Status:  Euvolemic   PONV Controlled:  Controlled   Airway Patency:  Patent      Post Op Vitals Reviewed: Yes      Staff: CRNA         No complications documented      BP   91/55   Temp     Pulse 92   Resp 18   SpO2 92

## 2022-04-27 NOTE — H&P
Assessment/Plan:  Discussed risks and benefits of colonoscopy including low risk of bleeding if polypectomy performed, abdominal discomfort/bloating and very small risk of colon perforation  Explained bowel prep in detail and gave instructions detailing appropriate pre-procedure diet and medication use  Prescription for bowel prep will be sent to the pharmacy  Procedure will be scheduled at earliest convenience       No problem-specific Assessment & Plan notes found for this encounter          Diagnoses and all orders for this visit:     Encounter for screening colonoscopy  -     Ambulatory referral to General Surgery  -     polyethylene glycol (GLYCOLAX) 17 GM/SCOOP powder; Take 238 g by mouth see administration instructions Mix 238 g with 64 oz of clear liquid  Drink half starting at noon on the day prior to colonoscopy  Drink remaining half 6 hours prior to procedure on day of colonoscopy  -     bisacodyl (DULCOLAX) 5 mg EC tablet; Take 1 tablet (5 mg total) by mouth see administration instructions Take 2 tablets at 12:00 p m  on day prior to colonoscopy  Take 2 tablets at 4:00 p m  on day prior to colonoscopy            Subjective:       Patient ID: Eloy Guzman  is a 77 y o  male        He presents to discuss screening colonoscopy  He has had no prior colon cancer screening  He denies any abdominal pain or rectal pain, no blood in his stool       A chart review was performed and previous primary care visit notes were reviewed  All applicable imaging studies were reviewed and images were reviewed personally  All applicable laboratory studies were reviewed personally  Care everywhere review was performed if  available and all pertinent notes were reviewed      The following portions of the patient's history were reviewed and updated as appropriate:   He  has a past medical history of CKD (chronic kidney disease), Hypertension, and Microalbuminuria    He        Patient Active Problem List     Diagnosis Date Noted    Persistent proteinuria 09/13/2021    Vitamin D deficiency 09/02/2020    Cyst of kidney, acquired 05/27/2020    CKD stage G3a/A2, GFR 45-59 and albumin creatinine ratio  mg/g 08/27/2019    Hyperlipidemia 05/07/2019    Tachycardia 02/12/2019    Essential hypertension 09/11/2018    COPD (chronic obstructive pulmonary disease) ([facility] [address] ) 09/11/2018    Osteopenia 03/01/2018    Presbycusis 02/15/2018      He  has a past surgical history that includes Strabismus surgery; Appendectomy; Hernia repair; and Bedside spirometry with bronchodilator Pre/Post (4/21/2021)  His family history includes No Known Problems in his father and mother  He  reports that he has quit smoking  His smoking use included cigarettes  He started smoking about 2 years ago  He has never used smokeless tobacco  He reports current alcohol use  He reports that he does not use drugs  Current Outpatient Medications   Medication Sig Dispense Refill    albuterol (PROVENTIL HFA,VENTOLIN HFA) 90 mcg/act inhaler Inhale 2 puffs every 6 (six) hours as needed for wheezing 18 g 0    amLODIPine (NORVASC) 5 mg tablet Take 1 tablet (5 mg total) by mouth daily 90 tablet 3    Blood Pressure KIT Use daily 1 kit 0    Cholecalciferol (Vitamin D) 50 MCG (2000 UT) CAPS Take by mouth daily        glycopyrrolate-formoterol (BEVESPI AEROSPHERE) 9-4 8 MCG/ACT inhaler Inhale 2 puffs 2 (two) times a day 10 7 g 3    lisinopril (ZESTRIL) 40 mg tablet Take 1 tablet (40 mg total) by mouth every evening 90 tablet 3    bisacodyl (DULCOLAX) 5 mg EC tablet Take 1 tablet (5 mg total) by mouth see administration instructions Take 2 tablets at 12:00 p m  on day prior to colonoscopy    Take 2 tablets at 4:00 p m  on day prior to colonoscopy 4 tablet 0    ergocalciferol (ERGOCALCIFEROL) 1 25 MG (61335 UT) capsule Take 1 capsule (50,000 Units total) by mouth once a week (Patient not taking: Reported on 9/13/2021) 12 capsule 0    polyethylene glycol (GLYCOLAX) 17 GM/SCOOP powder Take 238 g by mouth see administration instructions Mix 238 g with 64 oz of clear liquid  Drink half starting at noon on the day prior to colonoscopy  Drink remaining half 6 hours prior to procedure on day of colonoscopy  238 g 0      No current facility-administered medications for this visit       He has No Known Allergies        Review of Systems   Gastrointestinal: Negative for abdominal distention, abdominal pain, anal bleeding, blood in stool, constipation and rectal pain  All other systems reviewed and are negative          Objective:        /68 (BP Location: Left arm, Patient Position: Sitting, Cuff Size: Standard)   Pulse 78   Temp (!) 97 3 °F (36 3 °C) (Tympanic)   Ht 5' 8" (1 727 m)   Wt 86 6 kg (191 lb)   BMI 29 04 kg/m²             Physical Exam  Vitals reviewed  Constitutional:       General: He is not in acute distress  Appearance: Normal appearance  He is obese  HENT:      Head: Normocephalic and atraumatic  Eyes:      Extraocular Movements: Extraocular movements intact  Conjunctiva/sclera: Conjunctivae normal       Pupils: Pupils are equal, round, and reactive to light  Cardiovascular:      Rate and Rhythm: Normal rate and regular rhythm  Heart sounds: Normal heart sounds  Pulmonary:      Effort: Pulmonary effort is normal  No respiratory distress  Breath sounds: Normal breath sounds  Abdominal:      General: Abdomen is flat  There is no distension  Palpations: Abdomen is soft  Tenderness: There is no abdominal tenderness  Musculoskeletal:         General: No swelling or tenderness  Normal range of motion  Cervical back: Normal range of motion and neck supple  Skin:     General: Skin is warm and dry  Neurological:      General: No focal deficit present  Mental Status: He is alert and oriented to person, place, and time

## 2022-04-27 NOTE — ANESTHESIA PREPROCEDURE EVALUATION
EF61%, mild AS    Procedure:  COLONOSCOPY  anes wnl  Relevant Problems   CARDIO   (+) Essential hypertension   (+) Hyperlipidemia      /RENAL   (+) CKD stage G3a/A2, GFR 45-59 and albumin creatinine ratio  mg/g   (+) Cyst of kidney, acquired      PULMONARY   (+) Chronic obstructive pulmonary disease with acute exacerbation (HCC)        Physical Exam    Airway    Mallampati score: III  TM Distance: >3 FB  Neck ROM: limited     Dental       Cardiovascular  Rate: normal,     Pulmonary  Wheezes (baseline per pt),     Other Findings        Anesthesia Plan  ASA Score- 3     Anesthesia Type- IV sedation with anesthesia with ASA Monitors  Additional Monitors:   Airway Plan:     Comment: Per patient, appropriately NPO, denies active CP/SOB/wheezing/symptoms related to heartburn/nausea/vomiting  Plan Factors-Exercise tolerance (METS): >4 METS  Chart reviewed  Patient summary reviewed  Patient is a current smoker  Induction- intravenous  Postoperative Plan-     Informed Consent- Anesthetic plan and risks discussed with patient  I personally reviewed this patient with the CRNA  Discussed and agreed on the Anesthesia Plan with the CRNA  Cheyenne Maradiaga

## 2022-04-27 NOTE — DISCHARGE INSTRUCTIONS
Colonoscopy   WHAT YOU NEED TO KNOW:   A colonoscopy is a procedure to examine the inside of your colon (intestine) with a scope  Polyps or tissue growths may have been removed during your colonoscopy  It is normal to feel bloated and to have some abdominal discomfort  You should be passing gas  If you have hemorrhoids or you had polyps removed, you may have a small amount of bleeding  DISCHARGE INSTRUCTIONS:   Seek care immediately if:   · You have a large amount of bright red blood in your bowel movements  · Your abdomen is hard and firm and you have severe pain  · You have sudden trouble breathing  Call your doctor if:   · You develop a rash or hives  · You have a fever within 24 hours of your procedure  · You have nausea and vomiting  · You feel anesthesia effects greater than 24 hours  · You have not had a bowel movement for 3 days after your procedure  · You have questions or concerns about your condition or care  After your colonoscopy:   · Do not lift, strain, or run  until your healthcare provider says it is okay  · Rest as much as possible  You have been given medicine to relax you  Do not  drive or make important decisions for at least 24 hours  Return to your normal activity as directed  · Relieve gas and discomfort from bloating  by lying on your left side with a heating pad on your abdomen  You may need to take short walks to help the gas move out  Eat small meals until bloating is relieved  If you had polyps removed: For 7 days after your procedure:  · Do not  take aspirin  · Do not  go on long car rides  Help prevent constipation:   · Eat a variety of healthy foods  Healthy foods include fruit, vegetables, whole-grain breads, low-fat dairy products, beans, lean meat, and fish  Ask if you need to be on a special diet  Your healthcare provider may recommend that you eat high-fiber foods such as cooked beans   Fiber helps you have regular bowel movements  · Drink liquids as directed  Adults should drink between 9 and 13 eight-ounce cups of liquid every day  Ask what amount is best for you  For most people, good liquids to drink are water, juice, and milk  · Exercise as directed  Talk to your healthcare provider about the best exercise plan for you  Exercise can help prevent constipation, decrease your blood pressure and improve your health  Follow up with your doctor as directed:  Write down your questions so you remember to ask them during your visits  © Copyright [employer] 2022 Information is for End User's use only and may not be sold, redistributed or otherwise used for commercial purposes  All illustrations and images included in CareNotes® are the copyrighted property of A D A M , Inc  or [facility] Mathew Luevano   The above information is an  only  It is not intended as medical advice for individual conditions or treatments  Talk to your doctor, nurse or pharmacist before following any medical regimen to see if it is safe and effective for you

## 2022-05-20 ENCOUNTER — TELEPHONE (OUTPATIENT)
Dept: SURGERY | Facility: CLINIC | Age: 77
End: 2022-05-20

## 2022-06-20 ENCOUNTER — TELEPHONE (OUTPATIENT)
Dept: NEPHROLOGY | Facility: CLINIC | Age: 77
End: 2022-06-20

## 2022-06-21 ENCOUNTER — OFFICE VISIT (OUTPATIENT)
Dept: DENTISTRY | Facility: CLINIC | Age: 77
End: 2022-06-21

## 2022-06-21 ENCOUNTER — LAB (OUTPATIENT)
Dept: LAB | Facility: CLINIC | Age: 77
End: 2022-06-21
Payer: MEDICARE

## 2022-06-21 VITALS — TEMPERATURE: 97.5 F | HEART RATE: 109 BPM | DIASTOLIC BLOOD PRESSURE: 79 MMHG | SYSTOLIC BLOOD PRESSURE: 146 MMHG

## 2022-06-21 DIAGNOSIS — I10 ESSENTIAL HYPERTENSION: ICD-10-CM

## 2022-06-21 DIAGNOSIS — Z01.20 ENCOUNTER FOR DENTAL EXAMINATION: Primary | ICD-10-CM

## 2022-06-21 LAB
CHOLEST SERPL-MCNC: 176 MG/DL
HDLC SERPL-MCNC: 47 MG/DL
LDLC SERPL CALC-MCNC: 95 MG/DL (ref 0–100)
TRIGL SERPL-MCNC: 169 MG/DL

## 2022-06-21 PROCEDURE — D5110 COMPLETE DENTURE - MAXILLARY: HCPCS | Performed by: DENTIST

## 2022-06-21 PROCEDURE — D5120 COMPLETE DENTURE - MANDIBULAR: HCPCS | Performed by: DENTIST

## 2022-06-21 PROCEDURE — 36415 COLL VENOUS BLD VENIPUNCTURE: CPT

## 2022-06-21 PROCEDURE — 80061 LIPID PANEL: CPT

## 2022-06-21 NOTE — PROGRESS NOTES
PPTC for complete denture delivery  No CC at this visit  Tried in upper/lower dentures  Upper denture adjusted in the anterior maxillary flange area due to overextension  Pt reports comfortable fit and feel of dentures  Occlusion checked and verified for centric occlusion  Informed pt Fixodent will be needed for optimal denture retention - pt understood  Recommend f/u for adjustments as needed      NV: denture adjustments as needed

## 2022-06-22 NOTE — RESULT ENCOUNTER NOTE
Much improved Cholesterol, LDL, HDL   Continue current management and will follow up at next appointment

## 2022-08-01 ENCOUNTER — OFFICE VISIT (OUTPATIENT)
Dept: FAMILY MEDICINE CLINIC | Facility: CLINIC | Age: 77
End: 2022-08-01

## 2022-08-01 VITALS
RESPIRATION RATE: 18 BRPM | OXYGEN SATURATION: 94 % | HEIGHT: 68 IN | BODY MASS INDEX: 29.18 KG/M2 | DIASTOLIC BLOOD PRESSURE: 60 MMHG | TEMPERATURE: 98.2 F | WEIGHT: 192.5 LBS | SYSTOLIC BLOOD PRESSURE: 150 MMHG | HEART RATE: 114 BPM

## 2022-08-01 DIAGNOSIS — I10 ESSENTIAL HYPERTENSION: Primary | ICD-10-CM

## 2022-08-01 DIAGNOSIS — E78.5 HYPERLIPIDEMIA, UNSPECIFIED HYPERLIPIDEMIA TYPE: ICD-10-CM

## 2022-08-01 DIAGNOSIS — H91.10 PRESBYCUSIS, UNSPECIFIED LATERALITY: ICD-10-CM

## 2022-08-01 DIAGNOSIS — J45.990 EXERCISE-INDUCED ASTHMA: ICD-10-CM

## 2022-08-01 DIAGNOSIS — J44.9 CHRONIC OBSTRUCTIVE PULMONARY DISEASE, UNSPECIFIED COPD TYPE (HCC): ICD-10-CM

## 2022-08-01 DIAGNOSIS — Z00.00 HEALTHCARE MAINTENANCE: ICD-10-CM

## 2022-08-01 DIAGNOSIS — Z12.2 ENCOUNTER FOR SCREENING FOR LUNG CANCER: ICD-10-CM

## 2022-08-01 PROCEDURE — 99213 OFFICE O/P EST LOW 20 MIN: CPT | Performed by: FAMILY MEDICINE

## 2022-08-01 RX ORDER — ALBUTEROL SULFATE 90 UG/1
2 AEROSOL, METERED RESPIRATORY (INHALATION) EVERY 6 HOURS PRN
Qty: 18 G | Refills: 0 | Status: SHIPPED | OUTPATIENT
Start: 2022-08-01 | End: 2022-10-21 | Stop reason: SDUPTHER

## 2022-08-01 NOTE — ASSESSMENT & PLAN NOTE
· Most recent Lipid Panel 6/2022: mildly elevated triglycerides  All other values improved  · Discussed starting a moderate intensity statin   Feels like he is in a good place and wants to hold off for now  · Continue good diet and exercise

## 2022-08-01 NOTE — PROGRESS NOTES
[facility]   [address]  [place], [address]  Phone#  [phone]  Fax#  [phone]    ASSESSMENT and PLAN  Essential hypertension  · Current /72, repeat 150/60 Goal BP <150/90 per JNC 8 guidelines, controlled on repeat  States he took medications this morning   · Current Home Medications: Lisinopril 40 mg, Amlodipine 5 mg   · Per nephro: hold ACEI if dehydration due to GI losses due to risk of LILLY secondary to failure to autoregulate  No evidence of dehydration  · Statin currently used: none, does not wish to start at this time  , ASCVD Risk- 43 9  · Continue Lisinopril 40 mg, Amlodipine 5 mg   · RTC in 1 month for blood pressure check  Stated he will obtain a blood pressure cuff and bring it in in 1 month to show how to use it     Chronic obstructive pulmonary disease with acute exacerbation ([facility] [address] )  · Not in exacerbation   · Previous outpatient exacerbation 1/2022 treated with Prednisone and Azithromycin   · O2- 94% on room air  · Physical exam much improved without any wheezes rhonchi or rales  · Continue home Bevespi 2 puff BID with Albuterol prn       Healthcare maintenance  · Colonoscopy performed in April  Repeat in 5 years per result   · Repeat Lung CT ordered   · COVID vaccine UTD-discussing 4th booster    Presbycusis  · Referral to Audiology for evaluation as it has seemed to get worse    Hyperlipidemia  · Most recent Lipid Panel 6/2022: mildly elevated triglycerides  All other values improved  · Discussed starting a moderate intensity statin  Feels like he is in a good place and wants to hold off for now  · Continue good diet and exercise     Diagnoses and all orders for this visit:    Essential hypertension    Healthcare maintenance    Encounter for screening for lung cancer  -     CT lung screening program; Future    Exercise-induced asthma  -     albuterol (PROVENTIL HFA,VENTOLIN HFA) 90 mcg/act inhaler;  Inhale 2 puffs every 6 (six) hours as needed for wheezing    Presbycusis, unspecified laterality  -     Ambulatory Referral to Audiology; Future    [facility]ronic [facility] disease, unspecified [facility] )  -     glycopyrrolate-formoterol (BEVESPI AEROSPHERE) 9-4 8 MCG/ACT inhaler; Inhale 2 puffs 2 (two) times a day    Hyperlipidemia, unspecified hyperlipidemia type        HISTORY OF PRESENT ILLNESS  Hodan Henderson  is a 77 y o  male with a significant PMHx of COPD, HTN, History of tobacco abuse, CKD, Presbycusis who presents to the clinic for  management of their chronic medical conditions  Patient's medical conditions are stable unless noted otherwise above  Patient has not had any recent hospitalizations, or medical emergencies since last visit  Patient has no further complaints other than what is mentioned in the ROS  Smoking/Alcohol/Illicit Drug Use: quit smoking 2-3 years ago    REVIEW OF SYSTEMS  Review of Systems   Constitutional: Negative for chills, fatigue, fever and unexpected weight change  HENT: Positive for hearing loss  Negative for congestion, rhinorrhea, sinus pressure and sore throat  Eyes: Negative for visual disturbance  Respiratory: Negative for cough, chest tightness, shortness of breath and wheezing  Cardiovascular: Negative for chest pain  Gastrointestinal: Negative for abdominal distention, abdominal pain, blood in stool, constipation, diarrhea, nausea and vomiting  Genitourinary: Negative for difficulty urinating, dysuria, frequency, hematuria and urgency  Musculoskeletal: Negative for back pain and neck pain  Skin: Negative for rash  Allergic/Immunologic: Negative  Neurological: Negative for dizziness, weakness, light-headedness, numbness and headaches  Psychiatric/Behavioral: Negative for behavioral problems         PAST MEDICAL HISTORY   Past Medical History:   Diagnosis Date    Asthma     CKD (chronic kidney disease)     Hypertension     Microalbuminuria      Past Surgical History: Procedure Laterality Date    APPENDECTOMY      BEDSIDE SPIROMETRY WITH BRONCHODILATOR PRE/POST  4/21/2021    HERNIA REPAIR      STRABISMUS SURGERY       Social History     Socioeconomic History    Marital status: Single     Spouse name: Not on file    Number of children: Not on file    Years of education: Not on file    Highest education level: Not on file   Occupational History    Not on file   Tobacco Use    Smoking status: Former Smoker     Types: Cigarettes     Start date: 12/18/2018    Smokeless tobacco: Never Used   Vaping Use    Vaping Use: Never used   Substance and Sexual Activity    Alcohol use: Yes     Comment: Rarely; once a year    Drug use: Never    Sexual activity: Not on file   Other Topics Concern    Not on file   Social History Narrative    Not on file     Social Determinants of Health     Financial Resource Strain: Low Risk     Difficulty of Paying Living Expenses: Not hard at all   Food Insecurity: No Food Insecurity    Worried About Running Out of Food in the Last Year: Never true    Pepe of Food in the Last Year: Never true   Transportation Needs: No Transportation Needs    Lack of Transportation (Medical): No    Lack of Transportation (Non-Medical):  No   Physical Activity: Not on file   Stress: Not on file   Social Connections: Not on file   Intimate Partner Violence: Not on file   Housing Stability: Not on file     Family History   Problem Relation Age of Onset    No Known Problems Mother     No Known Problems Father        MEDICATIONS    Current Outpatient Medications:     albuterol (PROVENTIL HFA,VENTOLIN HFA) 90 mcg/act inhaler, Inhale 2 puffs every 6 (six) hours as needed for wheezing, Disp: 18 g, Rfl: 0    glycopyrrolate-formoterol (BEVESPI AEROSPHERE) 9-4 8 MCG/ACT inhaler, Inhale 2 puffs 2 (two) times a day, Disp: 10 7 g, Rfl: 3    amLODIPine (NORVASC) 5 mg tablet, Take 1 tablet (5 mg total) by mouth daily, Disp: 90 tablet, Rfl: 3    Blood Pressure KIT, Use daily, Disp: 1 kit, Rfl: 0    Cholecalciferol (Vitamin D) 50 MCG (2000 UT) CAPS, Take by mouth daily, Disp: , Rfl:     ergocalciferol (ERGOCALCIFEROL) 1 25 MG (24407 UT) capsule, Take 1 capsule (50,000 Units total) by mouth once a week (Patient not taking: Reported on 9/13/2021), Disp: 12 capsule, Rfl: 0    lisinopril (ZESTRIL) 40 mg tablet, Take 1 tablet (40 mg total) by mouth every evening, Disp: 90 tablet, Rfl: 3    PHYSICAL EXAM  Vitals:    08/01/22 1036 08/01/22 1056   BP: (!) 174/72 150/60   BP Location: Left arm Left arm   Patient Position: Sitting Sitting   Cuff Size: Standard Standard   Pulse: (!) 114    Resp: 18    Temp: 98 2 °F (36 8 °C)    TempSrc: Temporal    SpO2: 94%    Weight: 87 3 kg (192 lb 8 oz)    Height: 5' 8" (1 727 m)      Wt Readings from Last 3 Encounters:   08/01/22 87 3 kg (192 lb 8 oz)   04/27/22 81 6 kg (180 lb)   04/20/22 83 kg (183 lb)    , Body mass index is 29 27 kg/m²  Physical Exam  Vitals and nursing note reviewed  Constitutional:       General: He is not in acute distress  Appearance: He is well-developed  He is not toxic-appearing  HENT:      Head: Normocephalic and atraumatic  Eyes:      Extraocular Movements: Extraocular movements intact  Pupils: Pupils are equal, round, and reactive to light  Cardiovascular:      Rate and Rhythm: Normal rate and regular rhythm  Heart sounds: Normal heart sounds  No murmur heard  Pulmonary:      Effort: Pulmonary effort is normal  No respiratory distress  Breath sounds: Normal breath sounds  No decreased breath sounds, wheezing, rhonchi or rales  Abdominal:      General: Bowel sounds are normal  There is no distension  Palpations: Abdomen is soft  Tenderness: There is no abdominal tenderness  There is no guarding  Musculoskeletal:         General: Normal range of motion  Cervical back: Normal range of motion and neck supple  Skin:     General: Skin is warm and dry     Neurological: Mental Status: He is alert and oriented to person, place, and time  Psychiatric:         Behavior: Behavior normal          LABS/IMAGING  Hemoglobin A1C   Date Value Ref Range Status   03/04/2019 5 9 4 2 - 6 3 % Final     HDL, Direct   Date Value Ref Range Status   06/21/2022 47 >=40 mg/dL Final     Comment:     Specimen collection should occur prior to Metamizole administration due to the potential for falsley depressed results  Triglycerides   Date Value Ref Range Status   06/21/2022 169 (H) See Comment mg/dL Final     Comment:     Triglyceride:     0-9Y            <75mg/dL     10Y-17Y         <90 mg/dL       >=18Y     Normal          <150 mg/dL     Borderline High 150-199 mg/dL     High            200-499 mg/dL        Very High       >499 mg/dL    Specimen collection should occur prior to N-Acetylcysteine or Metamizole administration due to the potential for falsely depressed results        WBC   Date Value Ref Range Status   04/22/2021 5 30 4 50 - 11 00 Thousand/uL Final   04/21/2021 6 40 4 50 - 11 00 Thousand/uL Final   04/20/2021 8 70 4 50 - 11 00 Thousand/uL Final     Hemoglobin   Date Value Ref Range Status   04/22/2021 12 4 (L) 13 5 - 17 5 g/dL Final   04/21/2021 12 3 (L) 13 5 - 17 5 g/dL Final   04/20/2021 13 4 (L) 13 5 - 17 5 g/dL Final     Platelets   Date Value Ref Range Status   04/22/2021 285 150 - 450 Thousands/uL Final   04/21/2021 279 150 - 450 Thousands/uL Final   04/20/2021 311 150 - 450 Thousands/uL Final     Potassium   Date Value Ref Range Status   04/22/2021 4 4 3 6 - 5 0 mmol/L Final   04/21/2021 4 5 3 6 - 5 0 mmol/L Final   04/20/2021 4 2 3 6 - 5 0 mmol/L Final     Chloride   Date Value Ref Range Status   04/22/2021 105 97 - 108 mmol/L Final   04/21/2021 106 97 - 108 mmol/L Final   04/20/2021 106 97 - 108 mmol/L Final     CO2   Date Value Ref Range Status   04/22/2021 24 22 - 30 mmol/L Final   04/21/2021 23 22 - 30 mmol/L Final   04/20/2021 26 22 - 30 mmol/L Final     BUN   Date Value Ref Range Status   04/22/2021 17 5 - 25 mg/dL Final   04/21/2021 22 5 - 25 mg/dL Final   04/20/2021 25 5 - 25 mg/dL Final     Creatinine   Date Value Ref Range Status   04/22/2021 1 39 0 70 - 1 50 mg/dL Final     Comment:     Standardized to IDMS reference method   04/21/2021 1 39 0 70 - 1 50 mg/dL Final     Comment:     Standardized to IDMS reference method   04/20/2021 1 68 (H) 0 70 - 1 50 mg/dL Final     Comment:     Standardized to IDMS reference method     eGFR   Date Value Ref Range Status   04/22/2021 50 (L) >60 ml/min/1 73sq m Final   04/21/2021 50 (L) >60 ml/min/1 73sq m Final   04/20/2021 39 (L) >60 ml/min/1 73sq m Final     Calcium   Date Value Ref Range Status   04/22/2021 9 2 8 4 - 10 2 mg/dL Final   04/21/2021 8 9 8 4 - 10 2 mg/dL Final   04/20/2021 8 5 8 4 - 10 2 mg/dL Final     AST   Date Value Ref Range Status   04/22/2021 26 17 - 59 U/L Final     Comment:     Specimen collection should occur prior to Sulfasalazine administration due to the potential for falsely depressed results  04/20/2021 25 17 - 59 U/L Final     Comment:     Specimen collection should occur prior to Sulfasalazine administration due to the potential for falsely depressed results  03/04/2019 20 17 - 59 U/L Final     Comment:       Specimen collection should occur prior to Sulfasalazine administration due to the potential for falsely depressed results  ALT   Date Value Ref Range Status   04/22/2021 22 <50 U/L Final     Comment:     Specimen collection should occur prior to Sulfasalazine administration due to the potential for falsely depressed results  04/20/2021 31 <50 U/L Final     Comment:     Specimen collection should occur prior to Sulfasalazine administration due to the potential for falsely depressed results  03/04/2019 26 9 - 52 U/L Final     Comment:       Specimen collection should occur prior to Sulfasalazine administration due to the potential for falsely depressed results        Alkaline Phosphatase Date Value Ref Range Status   04/22/2021 84 43 - 122 U/L Final   04/20/2021 99 43 - 122 U/L Final   03/04/2019 102 43 - 122 U/L Final     Magnesium   Date Value Ref Range Status   04/20/2021 1 9 1 6 - 2 3 mg/dL Final   08/12/2020 1 9 1 6 - 2 3 mg/dL Final     No results found for: TSH    This note has been dictated using [employer] software  It may contain errors, including improperly dictated words  Please contact physician directly for any questions       Fela Connors MD   PGY-3

## 2022-08-01 NOTE — ASSESSMENT & PLAN NOTE
· Colonoscopy performed in April   Repeat in 5 years per result   · Repeat Lung CT ordered   · COVID vaccine UTD-discussing 4th booster

## 2022-08-01 NOTE — ASSESSMENT & PLAN NOTE
· Current /72, repeat 150/60 Goal BP <150/90 per JNC 8 guidelines, controlled on repeat  States he took medications this morning   · Current Home Medications: Lisinopril 40 mg, Amlodipine 5 mg   · Per nephro: hold ACEI if dehydration due to GI losses due to risk of LILLY secondary to failure to autoregulate  No evidence of dehydration  · Statin currently used: none, does not wish to start at this time  , ASCVD Risk- 43 9  · Continue Lisinopril 40 mg, Amlodipine 5 mg   · RTC in 1 month for blood pressure check   Stated he will obtain a blood pressure cuff and bring it in in 1 month to show how to use it

## 2022-08-01 NOTE — ASSESSMENT & PLAN NOTE
· Not in exacerbation   · Previous outpatient exacerbation 1/2022 treated with Prednisone and Azithromycin   · O2- 94% on room air  · Physical exam much improved without any wheezes rhonchi or rales  · Continue home Bevespi 2 puff BID with Albuterol prn

## 2022-08-24 ENCOUNTER — TELEPHONE (OUTPATIENT)
Dept: OTHER | Facility: OTHER | Age: 77
End: 2022-08-24

## 2022-08-24 NOTE — TELEPHONE ENCOUNTER
Patient called to confirm date and time of next appointment   Patient was informed he has an appointment on 8/31/22 at 10:20am

## 2022-08-25 ENCOUNTER — RA CDI HCC (OUTPATIENT)
Dept: OTHER | Facility: HOSPITAL | Age: 77
End: 2022-08-25

## 2022-08-25 NOTE — PROGRESS NOTES
[address]  coding opportunities       Chart reviewed, no opportunity found: CHART REVIEWED, NO OPPORTUNITY FOUND        Patients Insurance     Medicare Insurance: Medicare

## 2022-09-01 ENCOUNTER — HOSPITAL ENCOUNTER (OUTPATIENT)
Dept: CT IMAGING | Facility: HOSPITAL | Age: 77
End: 2022-09-01
Payer: MEDICARE

## 2022-09-01 DIAGNOSIS — Z12.2 ENCOUNTER FOR SCREENING FOR LUNG CANCER: ICD-10-CM

## 2022-09-01 PROCEDURE — 71271 CT THORAX LUNG CANCER SCR C-: CPT

## 2022-09-06 NOTE — RESULT ENCOUNTER NOTE
Please call the patient and inform him that he has no nodules present on his most recent lung CT scan  We will repeat a lung CT in 1 year, per guidelines  Thank you!

## 2022-09-14 ENCOUNTER — OFFICE VISIT (OUTPATIENT)
Dept: FAMILY MEDICINE CLINIC | Facility: CLINIC | Age: 77
End: 2022-09-14

## 2022-09-14 VITALS
DIASTOLIC BLOOD PRESSURE: 60 MMHG | BODY MASS INDEX: 28.46 KG/M2 | WEIGHT: 187.8 LBS | OXYGEN SATURATION: 92 % | HEIGHT: 68 IN | TEMPERATURE: 98.2 F | SYSTOLIC BLOOD PRESSURE: 138 MMHG | HEART RATE: 115 BPM | RESPIRATION RATE: 18 BRPM

## 2022-09-14 DIAGNOSIS — Z23 ENCOUNTER FOR IMMUNIZATION: ICD-10-CM

## 2022-09-14 DIAGNOSIS — J44.1 CHRONIC OBSTRUCTIVE PULMONARY DISEASE WITH ACUTE EXACERBATION (HCC): Primary | ICD-10-CM

## 2022-09-14 DIAGNOSIS — I10 ESSENTIAL HYPERTENSION: ICD-10-CM

## 2022-09-14 DIAGNOSIS — Z00.00 HEALTHCARE MAINTENANCE: ICD-10-CM

## 2022-09-14 DIAGNOSIS — H91.10 PRESBYCUSIS, UNSPECIFIED LATERALITY: ICD-10-CM

## 2022-09-14 PROCEDURE — 90662 IIV NO PRSV INCREASED AG IM: CPT | Performed by: FAMILY MEDICINE

## 2022-09-14 PROCEDURE — G0008 ADMIN INFLUENZA VIRUS VAC: HCPCS | Performed by: FAMILY MEDICINE

## 2022-09-14 PROCEDURE — 99213 OFFICE O/P EST LOW 20 MIN: CPT | Performed by: FAMILY MEDICINE

## 2022-09-14 NOTE — ASSESSMENT & PLAN NOTE
· Current /80, repeat 150/60 Goal BP <150/90 per JNC 8 guidelines, controlled on repeat  · Current Home Medications: Lisinopril 40 mg, Amlodipine 5 mg   · Per nephro: hold ACEI if dehydration due to GI losses due to risk of LILLY secondary to failure to autoregulate   No evidence of dehydration  · Statin currently used: none, does not wish to start at this time  , ASCVD Risk- 43 9  · Continue Lisinopril 40 mg, Amlodipine 5 mg   · Will follow up with Insurance company to try and obtain Blood Pressure cuff

## 2022-09-14 NOTE — ASSESSMENT & PLAN NOTE
· Colonoscopy performed in April   Repeat in 5 years per result   · Repeat Lung CT performed and normal, repeat in 1 year  · COVID vaccine UTD-discussing 4th booster  · Flu vaccine administered today

## 2022-09-14 NOTE — ASSESSMENT & PLAN NOTE
· Referral to Audiology pending  · States he has appointment with [facility] audiology on 9/20 for evaluation

## 2022-09-14 NOTE — ASSESSMENT & PLAN NOTE
· Not in exacerbation   · Previous outpatient exacerbation 1/2022 treated with Prednisone and Azithromycin   · O2- 92% on room air, saturations declining over the last coupleof visits  · Physical exam much improved without any wheezes rhonchi or rales  · Continue home Bevespi 2 puff BID with Albuterol prn   · Will refer to Pulmonology in the setting of saturations   Asymptomatic but would like in person evaluation to maximize current medication regiment   · ER precautions given for any worsening shortness of breath, chest pain vision changes, coughs, fevers

## 2022-09-14 NOTE — PROGRESS NOTES
[facility]   [address]  [place], [address]  Phone#  [phone]  Fax#  [phone]    ASSESSMENT and PLAN  Chronic obstructive pulmonary disease with acute exacerbation (HCC)  · Not in exacerbation   · Previous outpatient exacerbation 1/2022 treated with Prednisone and Azithromycin   · O2- 92% on room air, saturations declining over the last coupleof visits  · Physical exam much improved without any wheezes rhonchi or rales  · Continue home Bevespi 2 puff BID with Albuterol prn   · Will refer to Pulmonology in the setting of saturations  Asymptomatic but would like in person evaluation to maximize current medication regiment   · ER precautions given for any worsening shortness of breath, chest pain vision changes, coughs, fevers      Essential hypertension  · Current /80, repeat 150/60 Goal BP <150/90 per JNC 8 guidelines, controlled on repeat  · Current Home Medications: Lisinopril 40 mg, Amlodipine 5 mg   · Per nephro: hold ACEI if dehydration due to GI losses due to risk of LILLY secondary to failure to autoregulate  No evidence of dehydration  · Statin currently used: none, does not wish to start at this time  , ASCVD Risk- 43 9  · Continue Lisinopril 40 mg, Amlodipine 5 mg   · Will follow up with Insurance company to try and obtain Blood Pressure cuff    Presbycusis  · Referral to Audiology pending  · States he has appointment with [facility] audiology on 9/20 for evaluation    Healthcare maintenance  · Colonoscopy performed in April  Repeat in 5 years per result   · Repeat Lung CT performed and normal, repeat in 1 year  · COVID vaccine UTD-discussing 4th booster  · Flu vaccine administered today    Diagnoses and all orders for this visit:    Chronic obstructive pulmonary disease with acute exacerbation ([facility] [address] )  -     Ambulatory Referral to Pulmonology;  Future    Encounter for immunization  -     influenza vaccine, high-dose, PF 0 7 mL (FLUZONE HIGH-DOSE)    Essential hypertension    Presbycusis, unspecified laterality    Healthcare maintenance        HISTORY OF PRESENT ILLNESS  Pedro Reed  is a 77 y o  male with a significant PMHx of HTN, COPD, History of tobaccoabuse, CKD, Presbycusis who presents to the clinic for  management of their chronic medical conditions  Patient's medical conditions are stable unless noted otherwise above  Patient has not had any recent hospitalizations, or medical emergencies since last visit  Patient has no further complaints other than what is mentioned in the ROS  Smoking/Alcohol/Illicit Drug Use: quit smoking 2-3 years ago    REVIEW OF SYSTEMS  Review of Systems   Constitutional: Negative for chills, fatigue, fever and unexpected weight change  HENT: Positive for hearing loss  Negative for congestion, rhinorrhea, sinus pressure and sore throat  Eyes: Negative for visual disturbance  Respiratory: Negative for cough, chest tightness, shortness of breath and wheezing  Cardiovascular: Negative for chest pain  Gastrointestinal: Negative for abdominal distention, abdominal pain, blood in stool, constipation, diarrhea, nausea and vomiting  Genitourinary: Negative for difficulty urinating, dysuria, frequency, hematuria and urgency  Musculoskeletal: Negative for back pain and neck pain  Skin: Negative for rash  Allergic/Immunologic: Negative  Neurological: Negative for dizziness, weakness, light-headedness, numbness and headaches  Psychiatric/Behavioral: Negative for behavioral problems         PAST MEDICAL HISTORY   Past Medical History:   Diagnosis Date    Asthma     CKD (chronic kidney disease)     Hypertension     Microalbuminuria      Past Surgical History:   Procedure Laterality Date    APPENDECTOMY      BEDSIDE SPIROMETRY WITH BRONCHODILATOR PRE/POST  4/21/2021    HERNIA REPAIR      STRABISMUS SURGERY       Social History     Socioeconomic History    Marital status: Single Spouse name: Not on file    Number of children: Not on file    Years of education: Not on file    Highest education level: Not on file   Occupational History    Not on file   Tobacco Use    Smoking status: Former Smoker     Types: Cigarettes     Start date: 12/18/2018    Smokeless tobacco: Never Used   Vaping Use    Vaping Use: Never used   Substance and Sexual Activity    Alcohol use: Yes     Comment: Rarely; once a year    Drug use: Never    Sexual activity: Not on file   Other Topics Concern    Not on file   Social History Narrative    Not on file     Social Determinants of Health     Financial Resource Strain: Low Risk     Difficulty of Paying Living Expenses: Not hard at all   Food Insecurity: No Food Insecurity    Worried About [address] [employer] in the Last Year: Never true    [address] [employer] in the Last Year: Never true   Transportation Needs: No Transportation Needs    Lack of Transportation (Medical): No    Lack of Transportation (Non-Medical):  No   Physical Activity: Not on file   Stress: Not on file   Social Connections: Not on file   Intimate Partner Violence: Not on file   Housing Stability: Not on file     Family History   Problem Relation Age of Onset    No Known Problems Mother     No Known Problems Father        MEDICATIONS    Current Outpatient Medications:     albuterol (PROVENTIL HFA,VENTOLIN HFA) 90 mcg/act inhaler, Inhale 2 puffs every 6 (six) hours as needed for wheezing, Disp: 18 g, Rfl: 0    amLODIPine (NORVASC) 5 mg tablet, Take 1 tablet (5 mg total) by mouth daily, Disp: 90 tablet, Rfl: 3    Blood Pressure KIT, Use daily, Disp: 1 kit, Rfl: 0    Cholecalciferol (Vitamin D) 50 MCG (2000 UT) CAPS, Take by mouth daily, Disp: , Rfl:     ergocalciferol (ERGOCALCIFEROL) 1 25 MG (34767 UT) capsule, Take 1 capsule (50,000 Units total) by mouth once a week (Patient not taking: Reported on 9/13/2021), Disp: 12 capsule, Rfl: 0    glycopyrrolate-formoterol (BEVESPI AEROSPHERE) 9-4 8 MCG/ACT inhaler, Inhale 2 puffs 2 (two) times a day, Disp: 10 7 g, Rfl: 3    lisinopril (ZESTRIL) 40 mg tablet, Take 1 tablet (40 mg total) by mouth every evening, Disp: 90 tablet, Rfl: 3    PHYSICAL EXAM  Vitals:    09/14/22 1036   BP: 138/60   BP Location: Left arm   Patient Position: Sitting   Cuff Size: Standard   Pulse: (!) 115   Resp: 18   Temp: 98 2 °F (36 8 °C)   TempSrc: Temporal   SpO2: 92%   Weight: 85 2 kg (187 lb 12 8 oz)   Height: 5' 8" (1 727 m)     Wt Readings from Last 3 Encounters:   09/14/22 85 2 kg (187 lb 12 8 oz)   08/01/22 87 3 kg (192 lb 8 oz)   04/27/22 81 6 kg (180 lb)    , Body mass index is 28 55 kg/m²  Physical Exam  Vitals and nursing note reviewed  Constitutional:       General: He is not in acute distress  Appearance: He is well-developed  He is not toxic-appearing  HENT:      Head: Normocephalic and atraumatic  Eyes:      Extraocular Movements: Extraocular movements intact  Pupils: Pupils are equal, round, and reactive to light  Cardiovascular:      Rate and Rhythm: Regular rhythm  Tachycardia present  Heart sounds: Normal heart sounds  No murmur heard  Pulmonary:      Effort: Pulmonary effort is normal  No respiratory distress  Breath sounds: Normal breath sounds  No decreased breath sounds, wheezing, rhonchi or rales  Abdominal:      General: Bowel sounds are normal  There is no distension  Palpations: Abdomen is soft  Tenderness: There is no abdominal tenderness  There is no guarding  Musculoskeletal:         General: Normal range of motion  Cervical back: Normal range of motion and neck supple  Skin:     General: Skin is warm and dry  Neurological:      Mental Status: He is alert and oriented to person, place, and time     Psychiatric:         Behavior: Behavior normal          LABS/IMAGING  Hemoglobin A1C   Date Value Ref Range Status   03/04/2019 5 9 4 2 - 6 3 % Final     HDL, Direct   Date Value Ref Range Status   06/21/2022 47 >=40 mg/dL Final     Comment:     Specimen collection should occur prior to Metamizole administration due to the potential for falsley depressed results  Triglycerides   Date Value Ref Range Status   06/21/2022 169 (H) See Comment mg/dL Final     Comment:     Triglyceride:     0-9Y            <75mg/dL     10Y-17Y         <90 mg/dL       >=18Y     Normal          <150 mg/dL     Borderline High 150-199 mg/dL     High            200-499 mg/dL        Very High       >499 mg/dL    Specimen collection should occur prior to N-Acetylcysteine or Metamizole administration due to the potential for falsely depressed results        WBC   Date Value Ref Range Status   04/22/2021 5 30 4 50 - 11 00 Thousand/uL Final   04/21/2021 6 40 4 50 - 11 00 Thousand/uL Final   04/20/2021 8 70 4 50 - 11 00 Thousand/uL Final     Hemoglobin   Date Value Ref Range Status   04/22/2021 12 4 (L) 13 5 - 17 5 g/dL Final   04/21/2021 12 3 (L) 13 5 - 17 5 g/dL Final   04/20/2021 13 4 (L) 13 5 - 17 5 g/dL Final     Platelets   Date Value Ref Range Status   04/22/2021 285 150 - 450 Thousands/uL Final   04/21/2021 279 150 - 450 Thousands/uL Final   04/20/2021 311 150 - 450 Thousands/uL Final     Potassium   Date Value Ref Range Status   04/22/2021 4 4 3 6 - 5 0 mmol/L Final   04/21/2021 4 5 3 6 - 5 0 mmol/L Final   04/20/2021 4 2 3 6 - 5 0 mmol/L Final     Chloride   Date Value Ref Range Status   04/22/2021 105 97 - 108 mmol/L Final   04/21/2021 106 97 - 108 mmol/L Final   04/20/2021 106 97 - 108 mmol/L Final     CO2   Date Value Ref Range Status   04/22/2021 24 22 - 30 mmol/L Final   04/21/2021 23 22 - 30 mmol/L Final   04/20/2021 26 22 - 30 mmol/L Final     BUN   Date Value Ref Range Status   04/22/2021 17 5 - 25 mg/dL Final   04/21/2021 22 5 - 25 mg/dL Final   04/20/2021 25 5 - 25 mg/dL Final     Creatinine   Date Value Ref Range Status   04/22/2021 1 39 0 70 - 1 50 mg/dL Final     Comment:     Standardized to IDMS reference method   04/21/2021 1 39 0 70 - 1 50 mg/dL Final     Comment:     Standardized to IDMS reference method   04/20/2021 1 68 (H) 0 70 - 1 50 mg/dL Final     Comment:     Standardized to IDMS reference method     eGFR   Date Value Ref Range Status   04/22/2021 50 (L) >60 ml/min/1 73sq m Final   04/21/2021 50 (L) >60 ml/min/1 73sq m Final   04/20/2021 39 (L) >60 ml/min/1 73sq m Final     Calcium   Date Value Ref Range Status   04/22/2021 9 2 8 4 - 10 2 mg/dL Final   04/21/2021 8 9 8 4 - 10 2 mg/dL Final   04/20/2021 8 5 8 4 - 10 2 mg/dL Final     AST   Date Value Ref Range Status   04/22/2021 26 17 - 59 U/L Final     Comment:     Specimen collection should occur prior to Sulfasalazine administration due to the potential for falsely depressed results  04/20/2021 25 17 - 59 U/L Final     Comment:     Specimen collection should occur prior to Sulfasalazine administration due to the potential for falsely depressed results  03/04/2019 20 17 - 59 U/L Final     Comment:       Specimen collection should occur prior to Sulfasalazine administration due to the potential for falsely depressed results  ALT   Date Value Ref Range Status   04/22/2021 22 <50 U/L Final     Comment:     Specimen collection should occur prior to Sulfasalazine administration due to the potential for falsely depressed results  04/20/2021 31 <50 U/L Final     Comment:     Specimen collection should occur prior to Sulfasalazine administration due to the potential for falsely depressed results  03/04/2019 26 9 - 52 U/L Final     Comment:       Specimen collection should occur prior to Sulfasalazine administration due to the potential for falsely depressed results        Alkaline Phosphatase   Date Value Ref Range Status   04/22/2021 84 43 - 122 U/L Final   04/20/2021 99 43 - 122 U/L Final   03/04/2019 102 43 - 122 U/L Final     Magnesium   Date Value Ref Range Status   04/20/2021 1 9 1 6 - 2 3 mg/dL Final   08/12/2020 1 9 1 6 - 2 3 mg/dL Final No results found for: TSH    This note has been dictated using [employer] software  It may contain errors, including improperly dictated words  Please contact physician directly for any questions       Wilian Lawton MD   PGY-3

## 2022-09-15 ENCOUNTER — TELEPHONE (OUTPATIENT)
Dept: NEPHROLOGY | Facility: CLINIC | Age: 77
End: 2022-09-15

## 2022-09-15 NOTE — TELEPHONE ENCOUNTER
Received a call from patient  Patient stated he has a missed call from us  I stated I do not see anything in the chart  Please advise

## 2022-09-20 ENCOUNTER — TELEPHONE (OUTPATIENT)
Dept: NEPHROLOGY | Facility: CLINIC | Age: 77
End: 2022-09-20

## 2022-09-29 ENCOUNTER — CONSULT (OUTPATIENT)
Dept: PULMONOLOGY | Facility: CLINIC | Age: 77
End: 2022-09-29
Payer: MEDICARE

## 2022-09-29 VITALS
WEIGHT: 193 LBS | HEIGHT: 68 IN | OXYGEN SATURATION: 95 % | SYSTOLIC BLOOD PRESSURE: 132 MMHG | BODY MASS INDEX: 29.25 KG/M2 | RESPIRATION RATE: 18 BRPM | TEMPERATURE: 96.9 F | DIASTOLIC BLOOD PRESSURE: 62 MMHG | HEART RATE: 106 BPM

## 2022-09-29 DIAGNOSIS — J44.1 CHRONIC OBSTRUCTIVE PULMONARY DISEASE WITH ACUTE EXACERBATION (HCC): ICD-10-CM

## 2022-09-29 PROCEDURE — 99203 OFFICE O/P NEW LOW 30 MIN: CPT | Performed by: INTERNAL MEDICINE

## 2022-09-29 NOTE — PROGRESS NOTES
Pulmonary Consultation   Maritza Garsia  77 y o  male MRN: [id number]  9/29/2022      Assessment:    1  Chronic obstructive pulmonary disease with acute exacerbation (HCC)  - likely due to longstanding smoking history  Spirometry a few years ago showed moderate obstructive airflow defect  He recently had a lung cancer screening CT without any nodules that require further workup  Currently on albuterol rescue inhaler and Bevespi which are both helping him out significantly  Plan:  - for now keep the same regimen of inhalers with Bevespi and albuterol rescue inhaler  - check complete PFTs to reassess severity of COPD  -     Ambulatory Referral to Pulmonology  -     Complete PFT with post bronchodilator; Future        Plan:    Diagnoses and all orders for this visit:    [facility]  -     Ambulatory Referral to Pulmonology  -     Complete PFT with post bronchodilator; Future        History of Present Illness   HPI:  Maritza Garsia  is a 77 y o  male who presents for evaluation of shortness of breath on exertion  He says this primarily happens when he is walking up hills  He has a very rare cough but when he does it is productive  He has a longstanding smoking history but cannot recall for how many years or how much he used to smoke  He also has a long history of drinking alcohol in the past   Denies any drug use  Denies any family history of any lung diseases and reports that he no longer has any family  He lives by himself  He currently has an albuterol rescue inhaler and Bevespi which he says helps him out  Review of Systems   Constitutional: Negative for chills and fever  HENT: Negative for congestion, postnasal drip and rhinorrhea  Eyes: Negative for itching  Respiratory: Positive for shortness of breath  Negative for cough, wheezing and stridor  Cardiovascular: Negative for chest pain, palpitations and leg swelling  Gastrointestinal: Negative for abdominal distention, abdominal pain, nausea and vomiting  Genitourinary: Negative for dysuria and flank pain  Musculoskeletal: Negative for arthralgias and myalgias  Skin: Negative for color change  Neurological: Negative for dizziness, light-headedness and headaches  Psychiatric/Behavioral: Negative  Historical Information   Past Medical History:   Diagnosis Date    Asthma     CKD (chronic kidney disease)     Hypertension     Microalbuminuria      Past Surgical History:   Procedure Laterality Date    APPENDECTOMY      BEDSIDE SPIROMETRY WITH BRONCHODILATOR PRE/POST  4/21/2021    HERNIA REPAIR      STRABISMUS SURGERY       Family History   Problem Relation Age of Onset    No Known Problems Mother     No Known Problems Father        Occupational History: retired  Used to work at [employer] for 34 years       Social History:  Longstanding history of smoking, longstanding history of alcohol abuse but denies any drug use    Meds/Allergies     Current Outpatient Medications:     albuterol (PROVENTIL HFA,VENTOLIN HFA) 90 mcg/act inhaler, Inhale 2 puffs every 6 (six) hours as needed for wheezing, Disp: 18 g, Rfl: 0    amLODIPine (NORVASC) 5 mg tablet, Take 1 tablet (5 mg total) by mouth daily, Disp: 90 tablet, Rfl: 3    Blood Pressure KIT, Use daily, Disp: 1 kit, Rfl: 0    Cholecalciferol (Vitamin D) 50 MCG (2000 UT) CAPS, Take by mouth daily, Disp: , Rfl:     glycopyrrolate-formoterol (BEVESPI AEROSPHERE) 9-4 8 MCG/ACT inhaler, Inhale 2 puffs 2 (two) times a day, Disp: 10 7 g, Rfl: 3    lisinopril (ZESTRIL) 40 mg tablet, Take 1 tablet (40 mg total) by mouth every evening, Disp: 90 tablet, Rfl: 3    ergocalciferol (ERGOCALCIFEROL) 1 25 MG (80744 UT) capsule, Take 1 capsule (50,000 Units total) by mouth once a week, Disp: 12 capsule, Rfl: 0  No Known Allergies    Vitals: Blood pressure 132/62, pulse (!) 106, temperature (!) 96 9 °F (36 1 °C), temperature source Tympanic, resp  rate 18, height 5' 8" (1 727 m), weight 87 5 kg (193 lb), SpO2 95 %  , Body mass index is 29 35 kg/m²  Oxygen Therapy  SpO2: 95 %  Oxygen Therapy: None (Room air)    Physical Exam  Physical Exam  Constitutional:       General: He is not in acute distress  Appearance: He is not diaphoretic  HENT:      Head: Normocephalic and atraumatic  Nose: Nose normal       Mouth/Throat:      Pharynx: No oropharyngeal exudate  Eyes:      General: No scleral icterus  Conjunctiva/sclera: Conjunctivae normal       Pupils: Pupils are equal, round, and reactive to light  Neck:      Thyroid: No thyromegaly  Vascular: No JVD  Trachea: No tracheal deviation  Cardiovascular:      Rate and Rhythm: Normal rate and regular rhythm  Heart sounds: Normal heart sounds  No murmur heard  No friction rub  No gallop  Pulmonary:      Effort: Pulmonary effort is normal  No respiratory distress  Breath sounds: No stridor  No wheezing or rales  Comments: Mild biapical wheezing  Abdominal:      General: Bowel sounds are normal  There is no distension  Palpations: Abdomen is soft  Tenderness: There is no abdominal tenderness  There is no guarding or rebound  Musculoskeletal:         General: No deformity  Normal range of motion  Cervical back: Normal range of motion and neck supple  Lymphadenopathy:      Cervical: No cervical adenopathy  Skin:     General: Skin is warm  Findings: No erythema or rash  Neurological:      Mental Status: He is alert and oriented to person, place, and time  Cranial Nerves: No cranial nerve deficit  Sensory: No sensory deficit  Labs: I have personally reviewed pertinent lab results    Lab Results   Component Value Date    WBC 5 30 04/22/2021    HGB 12 4 (L) 04/22/2021    HCT 38 2 (L) 04/22/2021    MCV 86 04/22/2021     04/22/2021     Lab Results   Component Value Date    CALCIUM 9 2 04/22/2021    K 4 4 04/22/2021    CO2 24 04/22/2021     04/22/2021    BUN 17 04/22/2021    CREATININE 1 39 04/22/2021     No results found for: IGE  Lab Results   Component Value Date    ALT 22 04/22/2021    AST 26 04/22/2021    ALKPHOS 84 04/22/2021         Imaging and other studies: I have personally reviewed pertinent reports  and I have personally reviewed pertinent films in PACS  I reviewed the CT of the chest from 09/01/2022 which shows no lung nodules or abnormalities in the lung parenchyma  Chronic calcified granuloma    Pulmonary function testing:  Spirometry from 03/04/2019 with moderate obstructive airflow defect    EKG, Pathology, and Other Studies: I have personally reviewed pertinent reports     and I have personally reviewed pertinent films in PACS    Pasha Mann MD  Pulmonary and Critical Care   [facility]

## 2022-10-03 ENCOUNTER — TELEPHONE (OUTPATIENT)
Dept: NEPHROLOGY | Facility: CLINIC | Age: 77
End: 2022-10-03

## 2022-10-03 NOTE — TELEPHONE ENCOUNTER
Called patient and left a voicemail to please have blood work done before the follow up appointment

## 2022-10-05 ENCOUNTER — TELEPHONE (OUTPATIENT)
Dept: NEPHROLOGY | Facility: CLINIC | Age: 77
End: 2022-10-05

## 2022-10-05 NOTE — TELEPHONE ENCOUNTER
Called patient and left voicemail asking to please have blood work done before the follow up appointment

## 2022-10-07 ENCOUNTER — APPOINTMENT (OUTPATIENT)
Dept: LAB | Facility: HOSPITAL | Age: 77
End: 2022-10-07
Attending: INTERNAL MEDICINE
Payer: MEDICARE

## 2022-10-07 LAB
25(OH)D3 SERPL-MCNC: 40.6 NG/ML (ref 30–100)
ALBUMIN SERPL BCP-MCNC: 4.2 G/DL (ref 3.5–5)
ANION GAP SERPL CALCULATED.3IONS-SCNC: 9 MMOL/L (ref 5–14)
BUN SERPL-MCNC: 24 MG/DL (ref 5–25)
CALCIUM SERPL-MCNC: 9.1 MG/DL (ref 8.4–10.2)
CHLORIDE SERPL-SCNC: 108 MMOL/L (ref 96–108)
CO2 SERPL-SCNC: 23 MMOL/L (ref 21–32)
CREAT SERPL-MCNC: 1.3 MG/DL (ref 0.7–1.5)
CREAT UR-MCNC: 150 MG/DL
GFR SERPL CREATININE-BSD FRML MDRD: 53 ML/MIN/1.73SQ M
GLUCOSE SERPL-MCNC: 120 MG/DL (ref 70–99)
PHOSPHATE SERPL-MCNC: 1.8 MG/DL (ref 2.5–4.8)
POTASSIUM SERPL-SCNC: 4.7 MMOL/L (ref 3.5–5.3)
PROT UR-MCNC: 13 MG/DL
PROT/CREAT UR: 0.09 MG/G{CREAT} (ref 0–0.1)
PTH-INTACT SERPL-MCNC: 69.1 PG/ML (ref 18.4–80.1)
SODIUM SERPL-SCNC: 140 MMOL/L (ref 135–147)

## 2022-10-10 ENCOUNTER — TELEPHONE (OUTPATIENT)
Dept: NEPHROLOGY | Facility: CLINIC | Age: 77
End: 2022-10-10

## 2022-10-10 ENCOUNTER — HOSPITAL ENCOUNTER (OUTPATIENT)
Dept: PULMONOLOGY | Facility: HOSPITAL | Age: 77
Discharge: HOME | End: 2022-10-10
Attending: INTERNAL MEDICINE
Payer: MEDICARE

## 2022-10-10 DIAGNOSIS — J44.1 CHRONIC OBSTRUCTIVE PULMONARY DISEASE WITH ACUTE EXACERBATION (HCC): ICD-10-CM

## 2022-10-10 PROCEDURE — 94760 N-INVAS EAR/PLS OXIMETRY 1: CPT

## 2022-10-10 PROCEDURE — 94729 DIFFUSING CAPACITY: CPT | Performed by: INTERNAL MEDICINE

## 2022-10-10 PROCEDURE — 94060 EVALUATION OF WHEEZING: CPT

## 2022-10-10 PROCEDURE — 94060 EVALUATION OF WHEEZING: CPT | Performed by: INTERNAL MEDICINE

## 2022-10-10 PROCEDURE — 94726 PLETHYSMOGRAPHY LUNG VOLUMES: CPT

## 2022-10-10 PROCEDURE — 94726 PLETHYSMOGRAPHY LUNG VOLUMES: CPT | Performed by: INTERNAL MEDICINE

## 2022-10-10 PROCEDURE — 94729 DIFFUSING CAPACITY: CPT

## 2022-10-10 RX ORDER — ALBUTEROL SULFATE 2.5 MG/3ML
2.5 SOLUTION RESPIRATORY (INHALATION) ONCE
Status: COMPLETED | OUTPATIENT
Start: 2022-10-10 | End: 2022-10-10

## 2022-10-10 RX ADMIN — ALBUTEROL SULFATE 2.5 MG: 2.5 SOLUTION RESPIRATORY (INHALATION) at 15:31

## 2022-10-10 NOTE — TELEPHONE ENCOUNTER
----- Message from Lourdes Barnes [address] sent at 10/10/2022  3:37 PM EDT -----  Please let patient know lab values reviewed and stable  No change in current treatment    Will discuss fully with next office visit

## 2022-10-10 NOTE — TELEPHONE ENCOUNTER
Called patient and left a voicemail stating the following:    Please let patient know lab values reviewed and stable  No change in current treatment  Will discuss fully with next office visit

## 2022-10-12 PROBLEM — Z00.00 HEALTHCARE MAINTENANCE: Status: RESOLVED | Noted: 2022-01-18 | Resolved: 2022-10-12

## 2022-10-13 ENCOUNTER — TELEPHONE (OUTPATIENT)
Dept: NEPHROLOGY | Facility: CLINIC | Age: 77
End: 2022-10-13

## 2022-10-13 NOTE — TELEPHONE ENCOUNTER
Appointment Confirmation   Person confirmed appointment with  If not patient, name of the person lm    Date and time of appointment 10/14  11:00    Patient acknowledged and will be at appointment? no    Did you advise the patient that they will need a urine sample if they are a new patient?  N/A    Did you advise the patient to bring their current medications for verification? (including any OTC) Yes    Additional Information

## 2022-10-14 ENCOUNTER — OFFICE VISIT (OUTPATIENT)
Dept: NEPHROLOGY | Facility: CLINIC | Age: 77
End: 2022-10-14
Payer: MEDICARE

## 2022-10-14 VITALS
BODY MASS INDEX: 29.7 KG/M2 | HEIGHT: 68 IN | DIASTOLIC BLOOD PRESSURE: 64 MMHG | SYSTOLIC BLOOD PRESSURE: 144 MMHG | WEIGHT: 196 LBS

## 2022-10-14 DIAGNOSIS — E78.5 HYPERLIPIDEMIA, UNSPECIFIED HYPERLIPIDEMIA TYPE: ICD-10-CM

## 2022-10-14 DIAGNOSIS — N28.1 CYST OF KIDNEY, ACQUIRED: ICD-10-CM

## 2022-10-14 DIAGNOSIS — E55.9 VITAMIN D DEFICIENCY: ICD-10-CM

## 2022-10-14 DIAGNOSIS — R80.1 PERSISTENT PROTEINURIA: ICD-10-CM

## 2022-10-14 DIAGNOSIS — N18.31 CKD STAGE G3A/A2, GFR 45-59 AND ALBUMIN CREATININE RATIO 30-299 MG/G (HCC): Primary | ICD-10-CM

## 2022-10-14 DIAGNOSIS — I10 ESSENTIAL HYPERTENSION: ICD-10-CM

## 2022-10-14 PROCEDURE — 99214 OFFICE O/P EST MOD 30 MIN: CPT | Performed by: INTERNAL MEDICINE

## 2022-10-14 RX ORDER — FUROSEMIDE 20 MG/1
20 TABLET ORAL DAILY
Qty: 90 TABLET | Refills: 3 | Status: SHIPPED | OUTPATIENT
Start: 2022-10-14

## 2022-10-14 NOTE — PATIENT INSTRUCTIONS
- start lasix 20mg once a day  - get blood work in 1 month   - get blood work 6 months    - Please call me in 10 days after having your blood work done to review the results if you do not hear back from me or my office, as I may have not received the results  - please remember to perform blood work prior to the next visit  - Please call if the blood pressure top number is greater than 150 or less than 110 consistently  - Please call if you are gaining more than 2lbs in 2 days for adjustment of water pills   ~ Please AVOID the following pain medications  LIST OF NSAIDS (NONSTEROIDAL ANTI-INFLAMMATORY DRUGS) AND CHRISTIANSON-2 INHIBITORS    DIFLUNISAL (DOLOBID)  IBUPROFEN (MOTRIN, ADVIL)  FLURBIPROFEN (ANSAID)  KETOPROFEN (ORUDIS, ORUVAIL)  FENOPROFEN (NALFON)  NABUMETONE (RELAFEN)  PIROXICAM (FELDENE)  NAPROXEN (ALEVE, NAPROSYN, NAPRELAN, ANAPROX)  DICLOFENAC (VOLTAREN, CATAFLAM)  INDOMETHACIN (INDOCIN)  SULINDAC (CLINORIL)  TOLMETIN (TOLETIN)  ETODOLAC (LODINE)  MELOXICAM (MOBIC)  KETOROLAC (TORADOL)  OXAPROZIN (DAYPRO)  CELECOXIB (CELEBREX)    Phosphorus diet  Follow a moderate phosphorus diet      Avoid these higher phosphorus foods: Choose these lower phosphorus foods:   Milk, pudding or yogurt (from animals and from many soy varieties) Rice milk (unfortified), nondairy creamer (if it doesn't have terms in the ingredients list that contain the letters "phos")   Hard cheeses, ricotta or cottage cheese, fat-free cream cheese Regular and low-fat cream cheese   Ice cream or frozen yogurt Sherbet or frozen fruit pops   Soups made with higher phosphorus ingredients (milk, dried peas, beans, lentils) Soups made with lower phosphorus ingredients (broth- or water-based with other lower phosphorus ingredients)   Whole grains, including whole-grain breads, crackers, cereal, rice and pasta Refined grains, including white bread, crackers, cereals, rice and pasta   Quick breads, biscuits, cornbread, muffins, pancakes or waffles Homemade refined (white) dinner rolls, bagels or English muffins   Dried peas (split, black-eyed), beans (black, garbanzo, lima, kidney, navy, garcia) or lentils Green peas (canned, frozen), green beans or wax beans   Organ meats, walleye, pollock or sardines Lean beef, pork, lamb, poultry or other fish   Nuts and seeds Popcorn   Peanut butter and other nut butters Jam, jelly or honey   Chocolate, including chocolate drinks Carob (chocolate-flavored) candy, hard candy or gumdrops   Radha and pepper-type sodas, flavored mascorro, bottled teas (if a term in the ingredients list contains the letters "phos") Lemon-lime soda, ginger ale or root beer, plain water   Follow a moderate potassium diet  Things to do to reduce your blood pressure include working with all your physician to do the following:  ~ stop smoking if you smoke  ~ increase cardiovascular exercise like walking and swimming    ~ modify your diet to decrease fat and salt intake  ~ reduce your weight if you are overweight or obese   ~ increase the consumption of fruits, vegetables and whole grains  ~ decrease alcohol consumption if you consume alcohol    ~ try to minimize stress in your life with lifestyle modifications  ~ be compliant with your anti-hypertensive medications  ~ adjust your medications to help improve your vascular stiffness and decrease risks for heart attacks and strokes

## 2022-10-14 NOTE — PROGRESS NOTES
Nephrology Follow up Consultation  Bert Simon  77 y o  male MRN: [id number]            BACKGROUND:  Bert Simon  is a 77 y  o male who was referred by Pura Rodriguez MD for evaluation of Follow-up and Chronic Kidney Disease    ASSESSMENT / PLAN:   77 y o   male with pmh of multiple co-morbidities including  hypertension  (x 5yrs), COPD, hyperlipidemia and CKD stage 2/3 presents to the office for routine follow-up       1  CKD stage 2/3AA2:  - Patient has a baseline creatinine of 1 2-1 4 mg/dL  Most recent labs show a Creatinine of 1 30  mg/dL on 10/7/22  Renal function remains stable at baseline  Blood work in 1 month than 6 months   - likely has underlying CKD secondary to age-related nephron loss plus hypertensive nephrosclerosis  - renal ultrasound from 09/11/2019 showed right kidney 10 cm, 2 x 2 cm cyst in the right kidney and a septated cyst in the left kidney  Left kidney size not documented, no hydronephrosis  -renal ultrasound from 08/12/2020 showed bilateral medical renal disease, bilateral renal cysts, left kidney cyst partially septated may repeat ultrasound in 1 year  -  renal ultrasound from 06/24/2021 showing bilateral kidneys approximately 10 cm, stable right renal cysts, prostatomegaly  - Proteinuria -  protein creatinine ratio of 90 mg as of October 2022 stable improved  - Acid base and lytes stable   - appears to be minimally hypervolemic start on Lasix 20 mg p o  q day get blood work in 1 month  - Recommend to avoid use of NSAIDs, nephrotoxins  Caution advised with regards to exposure to IV contrast dye    - Discussed with the patient in depth his renal status, including the possible etiologies for CKD  - Advised the patient that when his GFR is close to 20mL/min then will start discussing about RRT(renal replacement therapy) options such as renal transplant, peritoneal dialysis and hemodialysis     - Informed the patient about the various options for Renal Replacement therapy  - Discussed with the patient how we need to work together to delay the progression of CKD with optimal BP control based on their age and co-morbidities and trying to reduce proteinuria by the use of anti-proteinuric agents       2  Hypertension:  - Patient is on lisinopril 40 mg po QHS, norvasc 5mg po q24    - start Lasix 20 mg p o  q day get blood work in 1 month  - Goal BP of <  140/90 based on age and comorbidities  - Instructed to follow low sodium (2gm)diet   - Advised to hold ACEI/ARBs if patient suffers from dehydration due to gastrointestinal losses due to risk of LILLY secondary to failure to autoregulate      3  Hemoglobin:  - Goal Hb of 10-12 g/dL  - Most recent labs suggestive of  12 4 g per dL  - no role for IV iron at this time     4  CKD-MBD(Mineral Bone Disease)/vitamin-D deficiency:  - Based on patients CKD stage following is the goal of therapy  - Maintain calcium phosphorus product of < 55   - Stage 3 CKD - Goal Ca 8 5-10 mg/dL , goal Phos 2 7-4 6 mg/dL  , goal iPTH 30-70 pg/mL  - Patient is currently at goal   -  continue vitamin-D 2000 units p o  Q day   - most recent vitamin-D level of  40 6 and intact PTH of 69 1 as of  October 2022  - check intact PTH and vitamin-D, prior to next visit and after today's visit     5  Lipids:  - goal LDL less than 70  - Management as per PCP     6  Hearing loss:  - management as per Primary team     7  COPD:  - Management as per primary team  - on inhalers  - follow-up with Pulmonary last seen 09/29/2022 notes reviewed      8  Nutrition:  - Encouraged patient to follow a renal diet comprising of moderate potassium, low phosphorus and protein restriction to 0 8gm/kg  - Will check serum albumin with next blood work       9  Followup:  - Patient is to follow-up in 12 months, with lab work to be performed in 1 month then again in 6 months and then again in a few days prior to the visit    Advised patient to call me in 10 days to review the results if they do not hear back from me, as I may have not received the results  Gabby Trejo Chery Lucero, 10/14/2022, 11:18 AM             SUBJECTIVE: 77 y o  male presents to the office for routine follow-up  Feels well has no complaints no recent hospitalization establish care with Pulmonary for his COPD  Has an upcoming follow-up appointment with testing  Not checking blood pressures at home no NSAID use thankful for the care information that he has gotten today  Review of Systems   Constitutional: Negative for chills, fatigue and fever  HENT: Negative for congestion and postnasal drip  Respiratory: Negative for cough, shortness of breath and wheezing  Cardiovascular: Negative for leg swelling  Gastrointestinal: Negative for abdominal pain, constipation, diarrhea, nausea and vomiting  Genitourinary: Negative for difficulty urinating, dysuria and hematuria  Musculoskeletal: Negative for back pain  Neurological: Negative for dizziness, light-headedness and headaches  Psychiatric/Behavioral: Negative for agitation and confusion  All other systems reviewed and are negative  PAST MEDICAL HISTORY:  Past Medical History:   Diagnosis Date   • Asthma    • CKD (chronic kidney disease)    • Hypertension    • Microalbuminuria        PROBLEM LIST    Patient Active Problem List   Diagnosis   • Presbycusis   • Osteopenia   • Essential hypertension   • Chronic obstructive pulmonary disease with acute exacerbation (HCC)   • Tachycardia   • Hyperlipidemia   • CKD stage G3a/A2, GFR 45-59 and albumin creatinine ratio  mg/g   • Cyst of kidney, acquired   • Vitamin D deficiency   • Persistent proteinuria       PAST SURGICAL HISTORY:  Past Surgical History:   Procedure Laterality Date   • APPENDECTOMY     • BEDSIDE SPIROMETRY WITH BRONCHODILATOR PRE/POST  4/21/2021   • HERNIA REPAIR     • STRABISMUS SURGERY         SOCIAL HISTORY :   reports that he quit smoking about 5 years ago   His smoking use included cigarettes  He started smoking about 58 years ago  He has a 26 50 pack-year smoking history  He has never used smokeless tobacco  He reports current alcohol use  He reports that he does not use drugs  FAMILY HISTORY:  Family History   Problem Relation Age of Onset   • No Known Problems Mother    • No Known Problems Father        ALLERGIES:  No Known Allergies        PHYSICAL EXAM:  Vitals:    10/14/22 1102 10/14/22 1111   BP: 160/70 144/64   BP Location: Left arm    Patient Position: Sitting    Cuff Size: Adult    Weight: 88 9 kg (196 lb)    Height: 5' 8" (1 727 m)      Body mass index is 29 8 kg/m²  Physical Exam  Vitals reviewed  Constitutional:       General: He is not in acute distress  Appearance: Normal appearance  He is normal weight  He is not ill-appearing, toxic-appearing or diaphoretic  HENT:      Head: Normocephalic and atraumatic  Mouth/Throat:      Mouth: Mucous membranes are moist       Pharynx: Oropharynx is clear  No oropharyngeal exudate  Eyes:      General: No scleral icterus  Conjunctiva/sclera: Conjunctivae normal    Cardiovascular:      Rate and Rhythm: Normal rate  Heart sounds: Normal heart sounds  No friction rub  Pulmonary:      Effort: Pulmonary effort is normal  No respiratory distress  Breath sounds: Normal breath sounds  No stridor  No wheezing  Abdominal:      General: There is no distension  Palpations: Abdomen is soft  There is no mass  Tenderness: There is no abdominal tenderness  There is no right CVA tenderness or left CVA tenderness  Musculoskeletal:         General: Swelling present  Cervical back: Normal range of motion and neck supple  No rigidity  Comments: Trace edema bilateral extremities   Skin:     General: Skin is warm  Coloration: Skin is not jaundiced  Neurological:      General: No focal deficit present  Mental Status: He is alert and oriented to person, place, and time   Mental status is at baseline  Psychiatric:         Mood and Affect: Mood normal          Behavior: Behavior normal          LABORATORY DATA:     Results from last 6 Months   Lab Units 10/07/22  1204   POTASSIUM mmol/L 4 7   CHLORIDE mmol/L 108   CO2 mmol/L 23   BUN mg/dL 24   CREATININE mg/dL 1 30   CALCIUM mg/dL 9 1   PHOSPHORUS mg/dL 1 8*        rest all reviewed    RADIOLOGY:  No orders to display     Rest all reviewed        MEDICATIONS:    Current Outpatient Medications:   •  albuterol (PROVENTIL HFA,VENTOLIN HFA) 90 mcg/act inhaler, Inhale 2 puffs every 6 (six) hours as needed for wheezing, Disp: 18 g, Rfl: 0  •  amLODIPine (NORVASC) 5 mg tablet, Take 1 tablet (5 mg total) by mouth daily, Disp: 90 tablet, Rfl: 3  •  Blood Pressure KIT, Use daily, Disp: 1 kit, Rfl: 0  •  Cholecalciferol (Vitamin D) 50 MCG (2000 UT) CAPS, Take by mouth daily, Disp: , Rfl:   •  furosemide (LASIX) 20 mg tablet, Take 1 tablet (20 mg total) by mouth daily, Disp: 90 tablet, Rfl: 3  •  glycopyrrolate-formoterol (BEVESPI AEROSPHERE) 9-4 8 MCG/ACT inhaler, Inhale 2 puffs 2 (two) times a day, Disp: 10 7 g, Rfl: 3  •  lisinopril (ZESTRIL) 40 mg tablet, Take 1 tablet (40 mg total) by mouth every evening, Disp: 90 tablet, Rfl: 3          Portions of the record may have been created with voice recognition software  Occasional wrong word or "sound a like" substitutions may have occurred due to the inherent limitations of voice recognition software  Read the chart carefully and recognize, using context, where substitutions have occurred  If you have any questions, please contact the dictating provider

## 2022-10-21 DIAGNOSIS — J45.990 EXERCISE-INDUCED ASTHMA: ICD-10-CM

## 2022-10-21 RX ORDER — ALBUTEROL SULFATE 90 UG/1
2 AEROSOL, METERED RESPIRATORY (INHALATION) EVERY 6 HOURS PRN
Qty: 18 G | Refills: 0 | Status: SHIPPED | OUTPATIENT
Start: 2022-10-21

## 2022-10-21 NOTE — TELEPHONE ENCOUNTER
Patient came in asking for refills for albuterol (PROVENTIL HFA,VENTOLIN HFA) 90 mcg/act inhaler  He is almost out  Please advise

## 2022-11-04 ENCOUNTER — APPOINTMENT (OUTPATIENT)
Dept: LAB | Facility: HOSPITAL | Age: 77
End: 2022-11-04
Attending: INTERNAL MEDICINE

## 2022-11-04 DIAGNOSIS — R80.1 PERSISTENT PROTEINURIA: ICD-10-CM

## 2022-11-04 DIAGNOSIS — I10 ESSENTIAL HYPERTENSION: ICD-10-CM

## 2022-11-04 DIAGNOSIS — E55.9 VITAMIN D DEFICIENCY: ICD-10-CM

## 2022-11-04 DIAGNOSIS — N18.31 CKD STAGE G3A/A2, GFR 45-59 AND ALBUMIN CREATININE RATIO 30-299 MG/G (HCC): ICD-10-CM

## 2022-11-04 DIAGNOSIS — E78.5 HYPERLIPIDEMIA, UNSPECIFIED HYPERLIPIDEMIA TYPE: ICD-10-CM

## 2022-11-04 DIAGNOSIS — N28.1 CYST OF KIDNEY, ACQUIRED: ICD-10-CM

## 2022-11-04 LAB
ANION GAP SERPL CALCULATED.3IONS-SCNC: 9 MMOL/L (ref 5–14)
BUN SERPL-MCNC: 36 MG/DL (ref 5–25)
CALCIUM SERPL-MCNC: 9.6 MG/DL (ref 8.4–10.2)
CHLORIDE SERPL-SCNC: 101 MMOL/L (ref 96–108)
CO2 SERPL-SCNC: 28 MMOL/L (ref 21–32)
CREAT SERPL-MCNC: 1.73 MG/DL (ref 0.7–1.5)
GFR SERPL CREATININE-BSD FRML MDRD: 37 ML/MIN/1.73SQ M
GLUCOSE P FAST SERPL-MCNC: 117 MG/DL (ref 70–99)
PHOSPHATE SERPL-MCNC: 2.6 MG/DL (ref 2.5–4.8)
POTASSIUM SERPL-SCNC: 5 MMOL/L (ref 3.5–5.3)
SODIUM SERPL-SCNC: 138 MMOL/L (ref 135–147)

## 2022-11-07 ENCOUNTER — TELEPHONE (OUTPATIENT)
Dept: NEPHROLOGY | Facility: CLINIC | Age: 77
End: 2022-11-07

## 2022-11-07 DIAGNOSIS — N28.1 CYST OF KIDNEY, ACQUIRED: ICD-10-CM

## 2022-11-07 DIAGNOSIS — R80.1 PERSISTENT PROTEINURIA: ICD-10-CM

## 2022-11-07 DIAGNOSIS — N18.31 CKD STAGE G3A/A2, GFR 45-59 AND ALBUMIN CREATININE RATIO 30-299 MG/G (HCC): Primary | ICD-10-CM

## 2022-11-07 DIAGNOSIS — E55.9 VITAMIN D DEFICIENCY: ICD-10-CM

## 2022-11-07 DIAGNOSIS — E78.5 HYPERLIPIDEMIA, UNSPECIFIED HYPERLIPIDEMIA TYPE: ICD-10-CM

## 2022-11-07 DIAGNOSIS — I10 ESSENTIAL HYPERTENSION: ICD-10-CM

## 2022-11-07 NOTE — TELEPHONE ENCOUNTER
I spoke to Chapito Lang 0217, he will decrease lasix to 20 mg every other day now  I will mail out lab slip to be done in 1 month

## 2022-11-07 NOTE — TELEPHONE ENCOUNTER
----- Message from 1 Flynn Dumas MD sent at 11/7/2022 10:27 AM EST -----  Please have patient decrease his Lasix to every other day and have him repeat BMP in 1 month thank you please place that order thanks

## 2022-11-07 NOTE — RESULT ENCOUNTER NOTE
Please have patient decrease his Lasix to every other day and have him repeat BMP in 1 month thank you please place that order thanks

## 2022-11-30 ENCOUNTER — OFFICE VISIT (OUTPATIENT)
Dept: PULMONOLOGY | Facility: CLINIC | Age: 77
End: 2022-11-30

## 2022-11-30 VITALS
DIASTOLIC BLOOD PRESSURE: 58 MMHG | SYSTOLIC BLOOD PRESSURE: 142 MMHG | RESPIRATION RATE: 18 BRPM | WEIGHT: 194.5 LBS | BODY MASS INDEX: 29.48 KG/M2 | TEMPERATURE: 97.4 F | OXYGEN SATURATION: 94 % | HEIGHT: 68 IN | HEART RATE: 83 BPM

## 2022-11-30 DIAGNOSIS — J44.9 CHRONIC OBSTRUCTIVE PULMONARY DISEASE, UNSPECIFIED COPD TYPE (HCC): Primary | ICD-10-CM

## 2022-11-30 DIAGNOSIS — J45.990 EXERCISE-INDUCED ASTHMA: ICD-10-CM

## 2022-11-30 RX ORDER — ALBUTEROL SULFATE 90 UG/1
2 AEROSOL, METERED RESPIRATORY (INHALATION) EVERY 6 HOURS PRN
Qty: 18 G | Refills: 12 | Status: SHIPPED | OUTPATIENT
Start: 2022-11-30

## 2022-11-30 NOTE — PROGRESS NOTES
Pulmonary Follow Up Note   Parviz Pinedo  69 y o  male MRN: [id number]  11/30/2022      Assessment:    1  Chronic obstructive pulmonary disease, unspecified COPD type ([facility] )  - I reviewed his PFT which shows significant obstruction however the DLCO is normal  This would make COPD less likely and phenotypically this would be more in line with asthma  His reversbility is only 6% however the FVC improves by 190ml which is near the cut off for significant based on ATS  Since he is reporting rarely needing to use the rescue inhaler since beginning brezetri, will continue the same regimen  Overall he is doing well  F/up in 1 year  Will send refills to his pharmacy  Plan:    Diagnoses and all orders for this visit:    Chronic obstructive pulmonary disease, unspecified COPD type ([facility] )      Return in about 1 year (around 11/30/2023)  History of Present Illness   HPI:  Parviz Pinedo  is a 69 y o  male who presents for his follow up of his shortness of breath  Since his last appointment he reports that his symptoms have significantly improves since beginning the brezetri  He rarely requires using his rescue inhaler  Denies any recent sick contacts, fevers, chills  No acute complaints today  Review of Systems   Constitutional: Negative for chills and fever  HENT: Negative for congestion, postnasal drip and rhinorrhea  Eyes: Negative for itching  Respiratory: Negative for cough, shortness of breath, wheezing and stridor  Cardiovascular: Negative for chest pain, palpitations and leg swelling  Gastrointestinal: Negative for abdominal distention, abdominal pain, nausea and vomiting  Genitourinary: Negative for dysuria and flank pain  Musculoskeletal: Negative for arthralgias and myalgias  Skin: Negative for color change  Neurological: Negative for dizziness, light-headedness and headaches  Psychiatric/Behavioral: Negative          Historical Information   Past Medical History:   Diagnosis Date   • Asthma    • CKD (chronic kidney disease)    • Hypertension    • Microalbuminuria      Past Surgical History:   Procedure Laterality Date   • APPENDECTOMY     • BEDSIDE SPIROMETRY WITH BRONCHODILATOR PRE/POST  4/21/2021   • HERNIA REPAIR     • STRABISMUS SURGERY       Family History   Problem Relation Age of Onset   • No Known Problems Mother    • No Known Problems Father          Meds/Allergies     Current Outpatient Medications:   •  albuterol (PROVENTIL HFA,VENTOLIN HFA) 90 mcg/act inhaler, Inhale 2 puffs every 6 (six) hours as needed for wheezing, Disp: 18 g, Rfl: 0  •  amLODIPine (NORVASC) 5 mg tablet, Take 1 tablet (5 mg total) by mouth daily, Disp: 90 tablet, Rfl: 3  •  Blood Pressure KIT, Use daily, Disp: 1 kit, Rfl: 0  •  Cholecalciferol (Vitamin D) 50 MCG (2000 UT) CAPS, Take by mouth daily, Disp: , Rfl:   •  furosemide (LASIX) 20 mg tablet, Take 1 tablet (20 mg total) by mouth daily, Disp: 90 tablet, Rfl: 3  •  glycopyrrolate-formoterol (BEVESPI AEROSPHERE) 9-4 8 MCG/ACT inhaler, Inhale 2 puffs 2 (two) times a day, Disp: 10 7 g, Rfl: 3  •  lisinopril (ZESTRIL) 40 mg tablet, Take 1 tablet (40 mg total) by mouth every evening, Disp: 90 tablet, Rfl: 3  No Known Allergies    Vitals: Blood pressure 142/58, pulse 83, temperature (!) 97 4 °F (36 3 °C), temperature source Tympanic, resp  rate 18, height 5' 8" (1 727 m), weight 88 2 kg (194 lb 8 oz), SpO2 94 %  Body mass index is 29 57 kg/m²  Oxygen Therapy  SpO2: 94 %  Oxygen Therapy: None (Room air)      Physical Exam  Physical Exam  Constitutional:       General: He is not in acute distress  Appearance: He is not diaphoretic  HENT:      Head: Normocephalic and atraumatic  Nose: Nose normal       Mouth/Throat:      Mouth: Oropharynx is clear and moist       Pharynx: No oropharyngeal exudate  Eyes:      General: No scleral icterus       Extraocular Movements: EOM normal       Conjunctiva/sclera: Conjunctivae normal       Pupils: Pupils are equal, round, and reactive to light  Neck:      Thyroid: No thyromegaly  Vascular: No JVD  Trachea: No tracheal deviation  Cardiovascular:      Rate and Rhythm: Normal rate and regular rhythm  Pulses: Intact distal pulses  Heart sounds: Normal heart sounds  No murmur heard  No friction rub  No gallop  Pulmonary:      Effort: Pulmonary effort is normal  No respiratory distress  Breath sounds: Normal breath sounds  No stridor  No wheezing or rales  Abdominal:      General: Bowel sounds are normal  There is no distension  Palpations: Abdomen is soft  Tenderness: There is no abdominal tenderness  There is no guarding or rebound  Musculoskeletal:         General: No deformity or edema  Normal range of motion  Cervical back: Normal range of motion and neck supple  Lymphadenopathy:      Cervical: No cervical adenopathy  Skin:     General: Skin is warm  Findings: No erythema or rash  Neurological:      Mental Status: He is alert and oriented to person, place, and time  Cranial Nerves: No cranial nerve deficit  Sensory: No sensory deficit  Psychiatric:         Mood and Affect: Mood and affect normal          Labs: I have personally reviewed pertinent lab results  Lab Results   Component Value Date    WBC 5 30 04/22/2021    HGB 12 4 (L) 04/22/2021    HCT 38 2 (L) 04/22/2021    MCV 86 04/22/2021     04/22/2021     Lab Results   Component Value Date    CALCIUM 9 6 11/04/2022    K 5 0 11/04/2022    CO2 28 11/04/2022     11/04/2022    BUN 36 (H) 11/04/2022    CREATININE 1 73 (H) 11/04/2022     No results found for: IGE  Lab Results   Component Value Date    ALT 22 04/22/2021    AST 26 04/22/2021    ALKPHOS 84 04/22/2021         Imaging and other studies: I have personally reviewed pertinent reports  Pulmonary function testing: 10/10/2022  Severe obstructive airflow defect with air trapping   Mild improvement with bronchodilators but does not meet ATS standard for reversibility  Normal diffusion capacity    EKG, Pathology, and Other Studies: I have personally reviewed pertinent reports        Isaak Lazo MD  Pulmonary and Critical Care   [facility]

## 2022-12-02 ENCOUNTER — APPOINTMENT (OUTPATIENT)
Dept: LAB | Facility: HOSPITAL | Age: 77
End: 2022-12-02
Attending: INTERNAL MEDICINE

## 2022-12-02 DIAGNOSIS — I10 ESSENTIAL HYPERTENSION: ICD-10-CM

## 2022-12-02 DIAGNOSIS — N28.1 CYST OF KIDNEY, ACQUIRED: ICD-10-CM

## 2022-12-02 DIAGNOSIS — R80.1 PERSISTENT PROTEINURIA: ICD-10-CM

## 2022-12-02 DIAGNOSIS — E55.9 VITAMIN D DEFICIENCY: ICD-10-CM

## 2022-12-02 DIAGNOSIS — N18.31 CKD STAGE G3A/A2, GFR 45-59 AND ALBUMIN CREATININE RATIO 30-299 MG/G (HCC): ICD-10-CM

## 2022-12-02 DIAGNOSIS — E78.5 HYPERLIPIDEMIA, UNSPECIFIED HYPERLIPIDEMIA TYPE: ICD-10-CM

## 2022-12-02 LAB
ANION GAP SERPL CALCULATED.3IONS-SCNC: 10 MMOL/L (ref 5–14)
BUN SERPL-MCNC: 32 MG/DL (ref 5–25)
CALCIUM SERPL-MCNC: 9.4 MG/DL (ref 8.4–10.2)
CHLORIDE SERPL-SCNC: 102 MMOL/L (ref 96–108)
CO2 SERPL-SCNC: 26 MMOL/L (ref 21–32)
CREAT SERPL-MCNC: 1.71 MG/DL (ref 0.7–1.5)
GFR SERPL CREATININE-BSD FRML MDRD: 38 ML/MIN/1.73SQ M
GLUCOSE P FAST SERPL-MCNC: 104 MG/DL (ref 70–99)
POTASSIUM SERPL-SCNC: 4.6 MMOL/L (ref 3.5–5.3)
PTH-INTACT SERPL-MCNC: 88.3 PG/ML (ref 18.4–80.1)
SODIUM SERPL-SCNC: 138 MMOL/L (ref 135–147)

## 2022-12-05 ENCOUNTER — TELEPHONE (OUTPATIENT)
Dept: NEPHROLOGY | Facility: CLINIC | Age: 77
End: 2022-12-05

## 2022-12-05 NOTE — TELEPHONE ENCOUNTER
----- Message from Yue López MD sent at 12/5/2022  1:16 PM EST -----  Please let the patient know that most recent lab work in terms of renal parameters are stable  The parathyroid hormone level slightly elevated we will monitor at next visit if it continues to be elevated then we may need to adjust his vitamin-D supplementation  Will discuss further at the upcoming visit, let me know if they have any questions or concerns      Thanks

## 2022-12-05 NOTE — RESULT ENCOUNTER NOTE
Please let the patient know that most recent lab work in terms of renal parameters are stable  The parathyroid hormone level slightly elevated we will monitor at next visit if it continues to be elevated then we may need to adjust his vitamin-D supplementation  Will discuss further at the upcoming visit, let me know if they have any questions or concerns      Thanks

## 2022-12-06 NOTE — TELEPHONE ENCOUNTER
I  spoke to Chapito Lang 1003 he is aware renal function is stable no changes are to be made at this time  Pth will continue to be monitored and if persistent vitamin d will be adjusted

## 2022-12-13 ENCOUNTER — TELEPHONE (OUTPATIENT)
Dept: NEPHROLOGY | Facility: CLINIC | Age: 77
End: 2022-12-13

## 2023-01-04 ENCOUNTER — PATIENT OUTREACH (OUTPATIENT)
Dept: OTHER | Facility: OTHER | Age: 78
End: 2023-01-04

## 2023-01-04 NOTE — PROGRESS NOTES
Client stopped in to see [address] (PN) during outreach hours at [employer]  Client familiar to PN  Client requested assistance in co-pay for bill from [facility] for $200 for pulmonary function test  Statement dated October 27, 2022, PN called Julian Ochoa financial counselor for assistance  Message left with client's contact info     Client shared he has had infection in left eye  States going to [facility] yesterday for an injection  Client is scheduled for another injection on Feb 14 at 1:45 PM   Client states has had about 17 injections so far  Doesn't know how he acquired infection  States eye is improving  Client reminded of PCP appointment Monday Jan 9 at 1:00PM      Client shared he continues to live in [place]  Offers no other complaints  Client pleasant in this encounter  Emotional support given

## 2023-01-08 NOTE — ASSESSMENT & PLAN NOTE
· Current /58, Goal BP <150/90 per [facility] 8 guidelines, controlled on repeat  · Current Home Medications: Lisinopril 40 mg, Amlodipine 5 mg   · Per nephro: hold ACEI if dehydration due to GI losses due to risk of LILLY secondary to failure to autoregulate   No evidence of dehydration  · Statin currently used: none, does not wish to start at this time  , ASCVD Risk- 43 9  · Continue Lisinopril 40 mg, Amlodipine 5 mg

## 2023-01-08 NOTE — PROGRESS NOTES
[facility]   [address] [facility] [address], [address] [facility]  Phone#  [phone]  Fax#  [phone]    ASSESSMENT and PLAN  Chronic obstructive pulmonary disease with acute exacerbation ([facility] 75 )  · Presenting with worsening shortness of breath, productive cough  Categorize as a mild COPD exacerbation  · Last exacerbation 1/2022 treated with Prednisone and Azithromycin   · O2- 96% on room air  · Saw pulmonology after last visit who recommended to continue current management  There is questions on his most recent pulmonary function test if you does have true COPD versus asthma at this time  Will follow-up with him in 1 year  · Physical exam much improved without any wheezes rhonchi or rales  · Continue home Bevespi 2 puff BID with Albuterol prn  · Start Prednisone 40 mg x 5 days  · Do not feel severity is significant and can hold off on Azithromycin at this time         Essential hypertension  · Current /58, Goal BP <150/90 per J[facility] 8 guidelines, controlled on repeat  · Current Home Medications: Lisinopril 40 mg, Amlodipine 5 mg   · Per nephro: hold ACEI if dehydration due to GI losses due to risk of LILLY secondary to failure to autoregulate  No evidence of dehydration  · Statin currently used: none, does not wish to start at this time  , ASCVD Risk- 43 9  · Continue Lisinopril 40 mg, Amlodipine 5 mg     Healthcare maintenance  · Colonoscopy performed in April  Repeat in 5 years per result 2027 and  · Repeat Lung CT performed and normal, repeat in 1 year (Fall 2023)  · COVID vaccine UTD-discussing 4th booster will return in 2 weeks for COVID booster  · Flu vaccine  up-to-date     Diagnoses and all orders for this visit:    Chronic obstructive pulmonary disease with acute exacerbation ([facility] 75 )  -     ipratropium-albuterol (DUO-NEB) 0 5-2 5 mg/3 mL inhalation solution 3 mL  -     predniSONE 20 mg tablet;  Take 2 tablets (40 mg total) by mouth daily for 5 days    Essential hypertension    Healthcare maintenance      HISTORY OF PRESENT ILLNESS  Dalila Mcmahon  is a 69 y o  male with a significant PMHx of HTN, COPD, History of tobaccoabuse, CKD, Presbycusis who presents to the clinic for  management of their chronic medical conditions  Patient presents with a two day history of worsening shortness of breath and productive cough  This is different from his baseline  He denies any fevers, chills, chest pain, headaches, nausea, vomiting  Patient has not had any recent hospitalizations, or medical emergencies since last visit  Patient has no further complaints other than what is mentioned in the ROS  Smoking/Alcohol/Illicit Drug Use: quit smoking 2-3 years ago    REVIEW OF SYSTEMS  Review of Systems   Constitutional: Negative for chills, fatigue, fever and unexpected weight change  HENT: Positive for hearing loss  Negative for congestion, rhinorrhea, sinus pressure and sore throat  Eyes: Negative for visual disturbance  Respiratory: Positive for cough and shortness of breath  Negative for chest tightness and wheezing  Cardiovascular: Negative for chest pain  Gastrointestinal: Negative for abdominal distention, abdominal pain, blood in stool, constipation, diarrhea, nausea and vomiting  Genitourinary: Negative for difficulty urinating, dysuria, frequency, hematuria and urgency  Musculoskeletal: Negative for back pain and neck pain  Skin: Negative for rash  Allergic/Immunologic: Negative  Neurological: Negative for dizziness, weakness, light-headedness, numbness and headaches  Psychiatric/Behavioral: Negative for behavioral problems       PAST MEDICAL HISTORY   Past Medical History:   Diagnosis Date   • Asthma    • CKD (chronic kidney disease)    • Hypertension    • Microalbuminuria      Past Surgical History:   Procedure Laterality Date   • APPENDECTOMY     • BEDSIDE SPIROMETRY WITH BRONCHODILATOR PRE/POST  4/21/2021   • HERNIA REPAIR     • STRABISMUS SURGERY       Social History     Socioeconomic History   • Marital status: Single     Spouse name: Not on file   • Number of children: Not on file   • Years of education: Not on file   • Highest education level: Not on file   Occupational History   • Not on file   Tobacco Use   • Smoking status: Former     Packs/day: 0 50     Years: 53 00     Pack years: 26 50     Types: Cigarettes     Start date:      Quit date:      Years since quittin 0     Passive exposure: Past   • Smokeless tobacco: Never   Vaping Use   • Vaping Use: Never used   Substance and Sexual Activity   • Alcohol use: Yes     Comment: Rarely; once a year   • Drug use: Never   • Sexual activity: Not on file   Other Topics Concern   • Not on file   Social History Narrative   • Not on file     Social Determinants of Health     Financial Resource Strain: Low Risk    • Difficulty of Paying Living Expenses: Not hard at all   Food Insecurity: No Food Insecurity   • Worried About Running Out of Food in the Last Year: Never true   • Ran Out of Food in the Last Year: Never true   Transportation Needs: No Transportation Needs   • Lack of Transportation (Medical): No   • Lack of Transportation (Non-Medical):  No   Physical Activity: Not on file   Stress: Not on file   Social Connections: Not on file   Intimate Partner Violence: Not on file   Housing Stability: Not on file     Family History   Problem Relation Age of Onset   • No Known Problems Mother    • No Known Problems Father      MEDICATIONS    Current Outpatient Medications:   •  predniSONE 20 mg tablet, Take 2 tablets (40 mg total) by mouth daily for 5 days, Disp: 10 tablet, Rfl: 0  •  albuterol (PROVENTIL HFA,VENTOLIN HFA) 90 mcg/act inhaler, Inhale 2 puffs every 6 (six) hours as needed for wheezing, Disp: 18 g, Rfl: 12  •  amLODIPine (NORVASC) 5 mg tablet, Take 1 tablet (5 mg total) by mouth daily, Disp: 90 tablet, Rfl: 3  •  Cholecalciferol (Vitamin D) 50 MCG ( UT) CAPS, Take by mouth daily, Disp: , Rfl:   •  furosemide (LASIX) 20 mg tablet, Take 1 tablet (20 mg total) by mouth daily, Disp: 90 tablet, Rfl: 3  •  glycopyrrolate-formoterol (BEVESPI AEROSPHERE) 9-4 8 MCG/ACT inhaler, Inhale 2 puffs 2 (two) times a day, Disp: 10 7 g, Rfl: 12  •  lisinopril (ZESTRIL) 40 mg tablet, Take 1 tablet (40 mg total) by mouth every evening, Disp: 90 tablet, Rfl: 3  No current facility-administered medications for this visit  PHYSICAL EXAM  Vitals:    01/09/23 1250 01/09/23 1340   BP: 124/58    BP Location: Left arm    Patient Position: Sitting    Cuff Size: Standard    Pulse: (!) 127 99   Resp: 16    Temp: 97 6 °F (36 4 °C)    TempSrc: Temporal    SpO2: 96%    Weight: 87 5 kg (193 lb)    Height: 5' 8" (1 727 m)      Wt Readings from Last 3 Encounters:   01/09/23 87 5 kg (193 lb)   11/30/22 88 2 kg (194 lb 8 oz)   10/14/22 88 9 kg (196 lb)    , Body mass index is 29 35 kg/m²  Physical Exam  Vitals and nursing note reviewed  Constitutional:       General: He is not in acute distress  Appearance: He is well-developed  He is not toxic-appearing  HENT:      Head: Normocephalic and atraumatic  Eyes:      Extraocular Movements: Extraocular movements intact  Pupils: Pupils are equal, round, and reactive to light  Cardiovascular:      Rate and Rhythm: Regular rhythm  Tachycardia present  Heart sounds: Normal heart sounds  No murmur heard  Pulmonary:      Effort: Pulmonary effort is normal  No respiratory distress  Breath sounds: Rhonchi present  No decreased breath sounds, wheezing or rales  Comments: Diminished air entry diffusely   Abdominal:      General: Bowel sounds are normal  There is no distension  Palpations: Abdomen is soft  Tenderness: There is no abdominal tenderness  There is no guarding  Musculoskeletal:         General: Normal range of motion  Cervical back: Normal range of motion and neck supple  Skin:     General: Skin is warm and dry  Neurological:      Mental Status: He is alert and oriented to person, place, and time  Psychiatric:         Behavior: Behavior normal          LABS/IMAGING  Hemoglobin A1C   Date Value Ref Range Status   03/04/2019 5 9 4 2 - 6 3 % Final     HDL, Direct   Date Value Ref Range Status   06/21/2022 47 >=40 mg/dL Final     Comment:     Specimen collection should occur prior to Metamizole administration due to the potential for falsley depressed results  Triglycerides   Date Value Ref Range Status   06/21/2022 169 (H) See Comment mg/dL Final     Comment:     Triglyceride:     0-9Y            <75mg/dL     10Y-17Y         <90 mg/dL       >=18Y     Normal          <150 mg/dL     Borderline High 150-199 mg/dL     High            200-499 mg/dL        Very High       >499 mg/dL    Specimen collection should occur prior to N-Acetylcysteine or Metamizole administration due to the potential for falsely depressed results        WBC   Date Value Ref Range Status   04/22/2021 5 30 4 50 - 11 00 Thousand/uL Final   04/21/2021 6 40 4 50 - 11 00 Thousand/uL Final   04/20/2021 8 70 4 50 - 11 00 Thousand/uL Final     Hemoglobin   Date Value Ref Range Status   04/22/2021 12 4 (L) 13 5 - 17 5 g/dL Final   04/21/2021 12 3 (L) 13 5 - 17 5 g/dL Final   04/20/2021 13 4 (L) 13 5 - 17 5 g/dL Final     Platelets   Date Value Ref Range Status   04/22/2021 285 150 - 450 Thousands/uL Final   04/21/2021 279 150 - 450 Thousands/uL Final   04/20/2021 311 150 - 450 Thousands/uL Final     Potassium   Date Value Ref Range Status   12/02/2022 4 6 3 5 - 5 3 mmol/L Final   11/04/2022 5 0 3 5 - 5 3 mmol/L Final   10/07/2022 4 7 3 5 - 5 3 mmol/L Final     Chloride   Date Value Ref Range Status   12/02/2022 102 96 - 108 mmol/L Final   11/04/2022 101 96 - 108 mmol/L Final   10/07/2022 108 96 - 108 mmol/L Final     CO2   Date Value Ref Range Status   12/02/2022 26 21 - 32 mmol/L Final   11/04/2022 28 21 - 32 mmol/L Final   10/07/2022 23 21 - 32 mmol/L Final BUN   Date Value Ref Range Status   12/02/2022 32 (H) 5 - 25 mg/dL Final   11/04/2022 36 (H) 5 - 25 mg/dL Final   10/07/2022 24 5 - 25 mg/dL Final     Creatinine   Date Value Ref Range Status   12/02/2022 1 71 (H) 0 70 - 1 50 mg/dL Final     Comment:     Standardized to IDMS reference method   11/04/2022 1 73 (H) 0 70 - 1 50 mg/dL Final     Comment:     Standardized to IDMS reference method   10/07/2022 1 30 0 70 - 1 50 mg/dL Final     Comment:     Standardized to IDMS reference method     eGFR   Date Value Ref Range Status   12/02/2022 38 ml/min/1 73sq m Final   11/04/2022 37 ml/min/1 73sq m Final   10/07/2022 53 ml/min/1 73sq m Final     Calcium   Date Value Ref Range Status   12/02/2022 9 4 8 4 - 10 2 mg/dL Final   11/04/2022 9 6 8 4 - 10 2 mg/dL Final   10/07/2022 9 1 8 4 - 10 2 mg/dL Final     AST   Date Value Ref Range Status   04/22/2021 26 17 - 59 U/L Final     Comment:     Specimen collection should occur prior to Sulfasalazine administration due to the potential for falsely depressed results  04/20/2021 25 17 - 59 U/L Final     Comment:     Specimen collection should occur prior to Sulfasalazine administration due to the potential for falsely depressed results  03/04/2019 20 17 - 59 U/L Final     Comment:       Specimen collection should occur prior to Sulfasalazine administration due to the potential for falsely depressed results  ALT   Date Value Ref Range Status   04/22/2021 22 <50 U/L Final     Comment:     Specimen collection should occur prior to Sulfasalazine administration due to the potential for falsely depressed results  04/20/2021 31 <50 U/L Final     Comment:     Specimen collection should occur prior to Sulfasalazine administration due to the potential for falsely depressed results  03/04/2019 26 9 - 52 U/L Final     Comment:       Specimen collection should occur prior to Sulfasalazine administration due to the potential for falsely depressed results        Alkaline Phosphatase   Date Value Ref Range Status   04/22/2021 84 43 - 122 U/L Final   04/20/2021 99 43 - 122 U/L Final   03/04/2019 102 43 - 122 U/L Final     Magnesium   Date Value Ref Range Status   04/20/2021 1 9 1 6 - 2 3 mg/dL Final   08/12/2020 1 9 1 6 - 2 3 mg/dL Final     No results found for: TSH    This note has been dictated using Wrike software  It may contain errors, including improperly dictated words  Please contact physician directly for any questions       Gill Hunter   PGY-3

## 2023-01-08 NOTE — ASSESSMENT & PLAN NOTE
· Colonoscopy performed in April   Repeat in 5 years per result 2027 and  · Repeat Lung CT performed and normal, repeat in 1 year (Fall 2023)  · COVID vaccine UTD-discussing 4th booster will return in 2 weeks for COVID booster  · Flu vaccine  up-to-date

## 2023-01-08 NOTE — ASSESSMENT & PLAN NOTE
· Presenting with worsening shortness of breath, productive cough  Categorize as a mild COPD exacerbation  · Last exacerbation 1/2022 treated with Prednisone and Azithromycin   · O2- 96% on room air  · Saw pulmonology after last visit who recommended to continue current management  There is questions on his most recent pulmonary function test if you does have true COPD versus asthma at this time    Will follow-up with him in 1 year  · Physical exam much improved without any wheezes rhonchi or rales  · Continue home Bevespi 2 puff BID with Albuterol prn  · Start Prednisone 40 mg x 5 days  · Do not feel severity is significant and can hold off on Azithromycin at this time

## 2023-01-09 ENCOUNTER — OFFICE VISIT (OUTPATIENT)
Dept: FAMILY MEDICINE CLINIC | Facility: CLINIC | Age: 78
End: 2023-01-09

## 2023-01-09 VITALS
RESPIRATION RATE: 16 BRPM | OXYGEN SATURATION: 96 % | DIASTOLIC BLOOD PRESSURE: 58 MMHG | TEMPERATURE: 97.6 F | HEART RATE: 99 BPM | HEIGHT: 68 IN | BODY MASS INDEX: 29.25 KG/M2 | SYSTOLIC BLOOD PRESSURE: 124 MMHG | WEIGHT: 193 LBS

## 2023-01-09 DIAGNOSIS — J44.1 CHRONIC OBSTRUCTIVE PULMONARY DISEASE WITH ACUTE EXACERBATION (HCC): Primary | ICD-10-CM

## 2023-01-09 DIAGNOSIS — I10 ESSENTIAL HYPERTENSION: ICD-10-CM

## 2023-01-09 DIAGNOSIS — Z00.00 HEALTHCARE MAINTENANCE: ICD-10-CM

## 2023-01-09 RX ORDER — IPRATROPIUM BROMIDE AND ALBUTEROL SULFATE 2.5; .5 MG/3ML; MG/3ML
3 SOLUTION RESPIRATORY (INHALATION) ONCE
Status: COMPLETED | OUTPATIENT
Start: 2023-01-09 | End: 2023-01-09

## 2023-01-09 RX ORDER — PREDNISONE 20 MG/1
40 TABLET ORAL DAILY
Qty: 10 TABLET | Refills: 0 | Status: SHIPPED | OUTPATIENT
Start: 2023-01-09 | End: 2023-01-14

## 2023-01-09 RX ADMIN — IPRATROPIUM BROMIDE AND ALBUTEROL SULFATE 3 ML: 2.5; .5 SOLUTION RESPIRATORY (INHALATION) at 13:20

## 2023-01-18 ENCOUNTER — PATIENT OUTREACH (OUTPATIENT)
Dept: OTHER | Facility: OTHER | Age: 78
End: 2023-01-18

## 2023-01-18 NOTE — PROGRESS NOTES
Wednesday January 18, 2023    Client requested to speak with [facility] ROZINA Nurse (PN) during outreach hours at [facility] ([facility])  Client inquired about the "Silver Sneakers" program  Client has Benjaman Lynn   Medicare [address]  PN texted Juni Molina Medicare specialist for assistance  Left message requesting call back  Client given number of Silver sneakers ([phone])  Client verbalized he will call and will update PN  States he is aware one of the locations is the [facility]  He reports that would be close to him since he resides in [place]  He shared he received a call a couple of weeks ago from the office of  his Nephrologist, Dr Ella Thomas with the instruction of reducing the "water pill' from every day to every OTHER day  States he has been taking every other day but he plans to call  to confirm instructions  Client pleasant in this encounter  Emotional support given

## 2023-01-19 ENCOUNTER — TELEPHONE (OUTPATIENT)
Dept: NEPHROLOGY | Facility: CLINIC | Age: 78
End: 2023-01-19

## 2023-01-19 NOTE — TELEPHONE ENCOUNTER
Received phone call from patient stating that in November, his Lasix was decreased to one tablet every other day  Had repeat labs in December and was told everything is stable  Patient just wanted to clarify if he is supposed to continue every other day still?

## 2023-01-23 ENCOUNTER — CLINICAL SUPPORT (OUTPATIENT)
Dept: FAMILY MEDICINE CLINIC | Facility: CLINIC | Age: 78
End: 2023-01-23

## 2023-01-23 DIAGNOSIS — Z23 ENCOUNTER FOR IMMUNIZATION: Primary | ICD-10-CM

## 2023-02-14 ENCOUNTER — PATIENT OUTREACH (OUTPATIENT)
Dept: OTHER | Facility: OTHER | Age: 78
End: 2023-02-14

## 2023-02-14 NOTE — PROGRESS NOTES
2/14/23: Client reported that he has joined [employer] and intends to go once it is warm  The [facility] is less than 2 miles from his home  He also is taking Lasix every other day as per his PCP  He stated he will start [employer] which is supplemental food

## 2023-03-07 ENCOUNTER — RA CDI HCC (OUTPATIENT)
Dept: OTHER | Facility: HOSPITAL | Age: 78
End: 2023-03-07

## 2023-03-09 PROBLEM — Z00.00 HEALTHCARE MAINTENANCE: Status: RESOLVED | Noted: 2022-01-18 | Resolved: 2023-03-09

## 2023-03-13 ENCOUNTER — OFFICE VISIT (OUTPATIENT)
Dept: FAMILY MEDICINE CLINIC | Facility: CLINIC | Age: 78
End: 2023-03-13

## 2023-03-13 ENCOUNTER — LAB (OUTPATIENT)
Dept: LAB | Facility: HOSPITAL | Age: 78
End: 2023-03-13

## 2023-03-13 ENCOUNTER — HOSPITAL ENCOUNTER (OUTPATIENT)
Dept: RADIOLOGY | Facility: HOSPITAL | Age: 78
Discharge: HOME | End: 2023-03-13

## 2023-03-13 VITALS
HEIGHT: 68 IN | WEIGHT: 194 LBS | TEMPERATURE: 97.6 F | DIASTOLIC BLOOD PRESSURE: 60 MMHG | RESPIRATION RATE: 18 BRPM | HEART RATE: 112 BPM | BODY MASS INDEX: 29.4 KG/M2 | SYSTOLIC BLOOD PRESSURE: 138 MMHG | OXYGEN SATURATION: 95 %

## 2023-03-13 DIAGNOSIS — R00.0 TACHYCARDIA: ICD-10-CM

## 2023-03-13 DIAGNOSIS — N18.31 CKD STAGE G3A/A2, GFR 45-59 AND ALBUMIN CREATININE RATIO 30-299 MG/G (HCC): ICD-10-CM

## 2023-03-13 DIAGNOSIS — R60.9 PERIPHERAL EDEMA: ICD-10-CM

## 2023-03-13 DIAGNOSIS — I10 ESSENTIAL HYPERTENSION: ICD-10-CM

## 2023-03-13 DIAGNOSIS — I10 ESSENTIAL HYPERTENSION: Primary | ICD-10-CM

## 2023-03-13 DIAGNOSIS — J44.1 CHRONIC OBSTRUCTIVE PULMONARY DISEASE WITH ACUTE EXACERBATION (HCC): ICD-10-CM

## 2023-03-13 PROBLEM — R60.0 PERIPHERAL EDEMA: Status: ACTIVE | Noted: 2023-03-13

## 2023-03-13 LAB
ANION GAP SERPL CALCULATED.3IONS-SCNC: 9 MMOL/L (ref 5–14)
BUN SERPL-MCNC: 23 MG/DL (ref 5–25)
CALCIUM SERPL-MCNC: 9.7 MG/DL (ref 8.4–10.2)
CHLORIDE SERPL-SCNC: 104 MMOL/L (ref 96–108)
CO2 SERPL-SCNC: 26 MMOL/L (ref 21–32)
CREAT SERPL-MCNC: 1.51 MG/DL (ref 0.7–1.5)
GFR SERPL CREATININE-BSD FRML MDRD: 44 ML/MIN/1.73SQ M
GLUCOSE P FAST SERPL-MCNC: 99 MG/DL (ref 70–99)
NT-PROBNP SERPL-MCNC: 74.1 PG/ML (ref 0–299)
POTASSIUM SERPL-SCNC: 4.9 MMOL/L (ref 3.5–5.3)
SODIUM SERPL-SCNC: 139 MMOL/L (ref 135–147)

## 2023-03-13 NOTE — ASSESSMENT & PLAN NOTE
· Follows with Nephrology  · Baseline Cr 1 2-1 4  · Last BMP showed a Cr of 1 71  · Renal US 8/2020- bilateral medical renal disease, bilateral renal cysts, left kidney cyst  · Will repeat BMP in the setting of worsening leg swelling

## 2023-03-13 NOTE — PROGRESS NOTES
[facility]   [address], [address]  Phone#  [phone]  Fax#  [phone]    ASSESSMENT and PLAN  Chronic obstructive pulmonary disease with acute exacerbation ([address] )  · Last exacerbation 1/2023 treated with Prednisone and Azithromycin   · O2- 95% on room air  · Saw pulmonology after last visit who recommended to continue current management  FEV/FVC ratio 45%  There is questions on his most recent pulmonary function test if you does have true COPD versus asthma at this time  Will follow-up with him in 1 year  · Always short of breath on exertion when walking long distances   · Physical exam much  without any wheezes rhonchi or rales  · Continue home Bevespi 2 puff BID with Albuterol prn  · Would recommend f/u with Pulmonology         Essential hypertension  · Current /60, Goal BP <150/90 per JNC 8 guidelines, controlled on repeat  · Current Home Medications: Lisinopril 40 mg, Amlodipine 5 mg   · Per nephro: hold ACEI if dehydration due to GI losses due to risk of LILLY secondary to failure to autoregulate  No evidence of dehydration  · Statin currently used: none, does not wish to start at this time  , ASCVD Risk- 43 9  · Continue Lisinopril 40 mg, Amlodipine 5 mg   · If no improvement in leg swelling/no abnormalities on labs or imaging will consider trialing off of  Amlodipine 5 mg     CKD stage G3a/A2, GFR 45-59 and albumin creatinine ratio  mg/g  · Follows with Nephrology  · Baseline Cr 1 2-1 4  · Last BMP showed a Cr of 1 71  · Renal US 8/2020- bilateral medical renal disease, bilateral renal cysts, left kidney cyst  · Will repeat BMP in the setting of worsening leg swelling      Peripheral edema  · 1+ pitting edema on exam  SOB on exertion but has been his baseline for years  · Last Echo 4/2019: Normal left ventricular systolic function, EF 27%  Normal left ventricular chamber size  Borderline concentric left ventricular hypertrophy   Normal left ventricular wall motion without regional wall motion abnormalities  Normal left ventricular diastolic function  Normal left atrial pressures  · Wt today is 194: 10 pound weight gain in the last year  · Continue Lasix every other day  If renal function stable will change to every day and check weight in 2 weeks  · Check BNP, BMP  · Check CXR  · Repeat Echo  · ER precautions given  · RTC in 2 weeks     Tachycardia  · Persistent for years   · HR today 125->112  Asymptomatic  · Last EKG was 2021  · Will repeat once initial workup is done     Diagnoses and all orders for this visit:    Essential hypertension  -     Basic metabolic panel; Future  -     XR chest pa & lateral; Future    Chronic obstructive pulmonary disease with acute exacerbation (HCC)    Tachycardia    CKD stage G3a/A2, GFR 45-59 and albumin creatinine ratio  mg/g    Peripheral edema  -     Basic metabolic panel; Future  -     XR chest pa & lateral; Future  -     NT-BNP PRO; Future  -     Echo complete w/ contrast if indicated; Future      HISTORY OF PRESENT ILLNESS  Kenna Mendez  is a 69 y o  male with a significant PMHx of HTN, COPD, History of tobaccoabuse, CKD, Presbycusis who presents to the clinic for  management of their chronic medical conditions  He denies any fevers, chills, chest pain, headaches, nausea, vomiting  Patient has not had any recent hospitalizations, or medical emergencies since last visit  Patient has no further complaints other than what is mentioned in the ROS  Smoking/Alcohol/Illicit Drug Use: quit smoking 2-3 years ago    REVIEW OF SYSTEMS  Review of Systems   Constitutional: Negative for chills, fatigue, fever and unexpected weight change  HENT: Positive for hearing loss  Negative for congestion, rhinorrhea, sinus pressure and sore throat  Eyes: Negative for visual disturbance  Respiratory: Negative for cough, chest tightness, shortness of breath and wheezing      Cardiovascular: Negative for chest pain  Gastrointestinal: Negative for abdominal distention, abdominal pain, blood in stool, constipation, diarrhea, nausea and vomiting  Genitourinary: Negative for difficulty urinating, dysuria, frequency, hematuria and urgency  Musculoskeletal: Negative for back pain and neck pain  Skin: Negative for rash  Allergic/Immunologic: Negative  Neurological: Negative for dizziness, weakness, light-headedness, numbness and headaches  Psychiatric/Behavioral: Negative for behavioral problems       PAST MEDICAL HISTORY   Past Medical History:   Diagnosis Date   • Asthma    • CKD (chronic kidney disease)    • Hypertension    • Microalbuminuria      Past Surgical History:   Procedure Laterality Date   • APPENDECTOMY     • BEDSIDE SPIROMETRY WITH BRONCHODILATOR PRE/POST  2021   • HERNIA REPAIR     • STRABISMUS SURGERY       Social History     Socioeconomic History   • Marital status: Single     Spouse name: Not on file   • Number of children: Not on file   • Years of education: Not on file   • Highest education level: Not on file   Occupational History   • Not on file   Tobacco Use   • Smoking status: Former     Packs/day: 0 50     Years: 53 00     Pack years: 26 50     Types: Cigarettes     Start date:      Quit date:      Years since quittin 1     Passive exposure: Past   • Smokeless tobacco: Never   Vaping Use   • Vaping Use: Never used   Substance and Sexual Activity   • Alcohol use: Yes     Comment: Rarely; once a year   • Drug use: Never   • Sexual activity: Not on file   Other Topics Concern   • Not on file   Social History Narrative   • Not on file     Social Determinants of Health     Financial Resource Strain: Not on file   Food Insecurity: Not on file   Transportation Needs: Not on file   Physical Activity: Not on file   Stress: Not on file   Social Connections: Not on file   Intimate Partner Violence: Not on file   Housing Stability: Not on file     Family History   Problem Relation Age of Onset   • No Known Problems Mother    • No Known Problems Father      MEDICATIONS    Current Outpatient Medications:   •  albuterol (PROVENTIL HFA,VENTOLIN HFA) 90 mcg/act inhaler, Inhale 2 puffs every 6 (six) hours as needed for wheezing, Disp: 18 g, Rfl: 12  •  amLODIPine (NORVASC) 5 mg tablet, Take 1 tablet (5 mg total) by mouth daily, Disp: 90 tablet, Rfl: 3  •  Cholecalciferol (Vitamin D) 50 MCG (2000 UT) CAPS, Take by mouth daily, Disp: , Rfl:   •  furosemide (LASIX) 20 mg tablet, Take 1 tablet (20 mg total) by mouth daily, Disp: 90 tablet, Rfl: 3  •  glycopyrrolate-formoterol (BEVESPI AEROSPHERE) 9-4 8 MCG/ACT inhaler, Inhale 2 puffs 2 (two) times a day, Disp: 10 7 g, Rfl: 12  •  lisinopril (ZESTRIL) 40 mg tablet, Take 1 tablet (40 mg total) by mouth every evening, Disp: 90 tablet, Rfl: 3    PHYSICAL EXAM  Vitals:    03/13/23 1430 03/13/23 1526   BP: 138/60    BP Location: Left arm    Patient Position: Sitting    Cuff Size: Standard    Pulse: (!) 125 (!) 112   Resp: 18    Temp: 97 6 °F (36 4 °C)    TempSrc: Temporal    SpO2: 95%    Weight: 88 kg (194 lb)    Height: 5' 8" (1 727 m)      Wt Readings from Last 3 Encounters:   03/13/23 88 kg (194 lb)   01/09/23 87 5 kg (193 lb)   11/30/22 88 2 kg (194 lb 8 oz)    , Body mass index is 29 5 kg/m²  Physical Exam  Vitals and nursing note reviewed  Constitutional:       General: He is not in acute distress  Appearance: He is well-developed  He is not toxic-appearing  HENT:      Head: Normocephalic and atraumatic  Eyes:      Extraocular Movements: Extraocular movements intact  Pupils: Pupils are equal, round, and reactive to light  Cardiovascular:      Rate and Rhythm: Regular rhythm  Tachycardia present  Heart sounds: Normal heart sounds  No murmur heard  Pulmonary:      Effort: Pulmonary effort is normal  No respiratory distress  Breath sounds: No decreased breath sounds, wheezing, rhonchi or rales        Comments: Diminished air entry diffusely   Abdominal:      General: Bowel sounds are normal  There is no distension  Palpations: Abdomen is soft  Tenderness: There is no abdominal tenderness  There is no guarding  Musculoskeletal:         General: Normal range of motion  Cervical back: Normal range of motion and neck supple  Right lower leg: Edema (1+) present  Left lower leg: Edema (1+) present  Skin:     General: Skin is warm and dry  Neurological:      Mental Status: He is alert and oriented to person, place, and time  Psychiatric:         Behavior: Behavior normal          LABS/IMAGING  Hemoglobin A1C   Date Value Ref Range Status   03/04/2019 5 9 4 2 - 6 3 % Final     HDL, Direct   Date Value Ref Range Status   06/21/2022 47 >=40 mg/dL Final     Comment:     Specimen collection should occur prior to Metamizole administration due to the potential for falsley depressed results  Triglycerides   Date Value Ref Range Status   06/21/2022 169 (H) See Comment mg/dL Final     Comment:     Triglyceride:     0-9Y            <75mg/dL     10Y-17Y         <90 mg/dL       >=18Y     Normal          <150 mg/dL     Borderline High 150-199 mg/dL     High            200-499 mg/dL        Very High       >499 mg/dL    Specimen collection should occur prior to N-Acetylcysteine or Metamizole administration due to the potential for falsely depressed results        WBC   Date Value Ref Range Status   04/22/2021 5 30 4 50 - 11 00 Thousand/uL Final   04/21/2021 6 40 4 50 - 11 00 Thousand/uL Final   04/20/2021 8 70 4 50 - 11 00 Thousand/uL Final     Hemoglobin   Date Value Ref Range Status   04/22/2021 12 4 (L) 13 5 - 17 5 g/dL Final   04/21/2021 12 3 (L) 13 5 - 17 5 g/dL Final   04/20/2021 13 4 (L) 13 5 - 17 5 g/dL Final     Platelets   Date Value Ref Range Status   04/22/2021 285 150 - 450 Thousands/uL Final   04/21/2021 279 150 - 450 Thousands/uL Final   04/20/2021 311 150 - 450 Thousands/uL Final     Potassium Date Value Ref Range Status   12/02/2022 4 6 3 5 - 5 3 mmol/L Final   11/04/2022 5 0 3 5 - 5 3 mmol/L Final   10/07/2022 4 7 3 5 - 5 3 mmol/L Final     Chloride   Date Value Ref Range Status   12/02/2022 102 96 - 108 mmol/L Final   11/04/2022 101 96 - 108 mmol/L Final   10/07/2022 108 96 - 108 mmol/L Final     CO2   Date Value Ref Range Status   12/02/2022 26 21 - 32 mmol/L Final   11/04/2022 28 21 - 32 mmol/L Final   10/07/2022 23 21 - 32 mmol/L Final     BUN   Date Value Ref Range Status   12/02/2022 32 (H) 5 - 25 mg/dL Final   11/04/2022 36 (H) 5 - 25 mg/dL Final   10/07/2022 24 5 - 25 mg/dL Final     Creatinine   Date Value Ref Range Status   12/02/2022 1 71 (H) 0 70 - 1 50 mg/dL Final     Comment:     Standardized to IDMS reference method   11/04/2022 1 73 (H) 0 70 - 1 50 mg/dL Final     Comment:     Standardized to IDMS reference method   10/07/2022 1 30 0 70 - 1 50 mg/dL Final     Comment:     Standardized to IDMS reference method     eGFR   Date Value Ref Range Status   12/02/2022 38 ml/min/1 73sq m Final   11/04/2022 37 ml/min/1 73sq m Final   10/07/2022 53 ml/min/1 73sq m Final     Calcium   Date Value Ref Range Status   12/02/2022 9 4 8 4 - 10 2 mg/dL Final   11/04/2022 9 6 8 4 - 10 2 mg/dL Final   10/07/2022 9 1 8 4 - 10 2 mg/dL Final     AST   Date Value Ref Range Status   04/22/2021 26 17 - 59 U/L Final     Comment:     Specimen collection should occur prior to Sulfasalazine administration due to the potential for falsely depressed results  04/20/2021 25 17 - 59 U/L Final     Comment:     Specimen collection should occur prior to Sulfasalazine administration due to the potential for falsely depressed results  03/04/2019 20 17 - 59 U/L Final     Comment:       Specimen collection should occur prior to Sulfasalazine administration due to the potential for falsely depressed results        ALT   Date Value Ref Range Status   04/22/2021 22 <50 U/L Final     Comment:     Specimen collection should occur prior to Sulfasalazine administration due to the potential for falsely depressed results  04/20/2021 31 <50 U/L Final     Comment:     Specimen collection should occur prior to Sulfasalazine administration due to the potential for falsely depressed results  03/04/2019 26 9 - 52 U/L Final     Comment:       Specimen collection should occur prior to Sulfasalazine administration due to the potential for falsely depressed results  Alkaline Phosphatase   Date Value Ref Range Status   04/22/2021 84 43 - 122 U/L Final   04/20/2021 99 43 - 122 U/L Final   03/04/2019 102 43 - 122 U/L Final     Magnesium   Date Value Ref Range Status   04/20/2021 1 9 1 6 - 2 3 mg/dL Final   08/12/2020 1 9 1 6 - 2 3 mg/dL Final     No results found for: TSH    This note has been dictated using VOYAA software  It may contain errors, including improperly dictated words  Please contact physician directly for any questions       Mardeen Boeck   PGY-3

## 2023-03-13 NOTE — ASSESSMENT & PLAN NOTE
· Last exacerbation 1/2023 treated with Prednisone and Azithromycin   · O2- 95% on room air  · Saw pulmonology after last visit who recommended to continue current management  FEV/FVC ratio 45%  There is questions on his most recent pulmonary function test if you does have true COPD versus asthma at this time    Will follow-up with him in 1 year  · Always short of breath on exertion when walking long distances   · Physical exam much  without any wheezes rhonchi or rales  · Continue home Bevespi 2 puff BID with Albuterol prn  · Would recommend f/u with Pulmonology

## 2023-03-13 NOTE — ASSESSMENT & PLAN NOTE
· Current /60, Goal BP <150/90 per JNC 8 guidelines, controlled on repeat  · Current Home Medications: Lisinopril 40 mg, Amlodipine 5 mg   · Per nephro: hold ACEI if dehydration due to GI losses due to risk of LILLY secondary to failure to autoregulate   No evidence of dehydration  · Statin currently used: none, does not wish to start at this time  , ASCVD Risk- 43 9  · Continue Lisinopril 40 mg, Amlodipine 5 mg   · If no improvement in leg swelling/no abnormalities on labs or imaging will consider trialing off of  Amlodipine 5 mg

## 2023-03-13 NOTE — ASSESSMENT & PLAN NOTE
· Persistent for years   · HR today 125->112   Asymptomatic  · Last EKG was 2021  · Will repeat once initial workup is done

## 2023-03-13 NOTE — ASSESSMENT & PLAN NOTE
· 1+ pitting edema on exam  SOB on exertion but has been his baseline for years  · Last Echo 4/2019: Normal left ventricular systolic function, EF 74%  Normal left ventricular chamber size  Borderline concentric left ventricular hypertrophy  Normal left ventricular wall motion without regional wall motion abnormalities  Normal left ventricular diastolic function  Normal left atrial pressures  · Wt today is 194: 10 pound weight gain in the last year  · Continue Lasix every other day   If renal function stable will change to every day and check weight in 2 weeks  · Check BNP, BMP  · Check CXR  · Repeat Echo  · ER precautions given  · RTC in 2 weeks

## 2023-03-14 NOTE — RESULT ENCOUNTER NOTE
Discussed with Rose Toledo  Normal BNP  Cr around baseline  Will go ahead with Lasix daily for the next 10-14 days  Will see in office and check weight  Will go to daily in the setting of peripheral edema, dyspnea on exertion and a 10 pound weight gain in a year

## 2023-03-27 ENCOUNTER — OFFICE VISIT (OUTPATIENT)
Dept: FAMILY MEDICINE CLINIC | Facility: CLINIC | Age: 78
End: 2023-03-27

## 2023-03-27 VITALS
TEMPERATURE: 98 F | DIASTOLIC BLOOD PRESSURE: 60 MMHG | BODY MASS INDEX: 29.02 KG/M2 | SYSTOLIC BLOOD PRESSURE: 136 MMHG | WEIGHT: 191.5 LBS | OXYGEN SATURATION: 95 % | HEART RATE: 93 BPM | HEIGHT: 68 IN | RESPIRATION RATE: 18 BRPM

## 2023-03-27 DIAGNOSIS — I10 ESSENTIAL HYPERTENSION: ICD-10-CM

## 2023-03-27 DIAGNOSIS — R60.9 PERIPHERAL EDEMA: Primary | ICD-10-CM

## 2023-03-27 PROBLEM — Z12.11 ENCOUNTER FOR SCREENING COLONOSCOPY: Status: ACTIVE | Noted: 2023-03-27

## 2023-03-27 PROBLEM — Z12.2 SCREENING FOR LUNG CANCER: Status: ACTIVE | Noted: 2023-03-27

## 2023-03-27 NOTE — ASSESSMENT & PLAN NOTE
· Current /60, Goal BP <150/90 per JNC 8 guidelines, controlled on repeat  · Current Home Medications: Lisinopril 40 mg, Amlodipine 5 mg   · Per nephro: hold ACEI if dehydration due to GI losses due to risk of LILLY secondary to failure to autoregulate  No evidence of dehydration  · Statin currently used: none, does not wish to start at this time  , ASCVD Risk- 32 3  · Continue Lisinopril 40 mg, Amlodipine 5 mg   · If no improvement in leg swelling/no abnormalities on labs or imaging will consider trialing off of  Amlodipine 5 mg  BP seems to be controlled 
· Improved trace pitting edema on exam seems to improved today from 1+  L > R   · Last Echo 4/2019: Normal left ventricular systolic function, EF 68%  Normal left ventricular chamber size  Borderline concentric left ventricular hypertrophy  Normal left ventricular wall motion without regional wall motion abnormalities  Normal left ventricular diastolic function  Normal left atrial pressures    · CXR: No acute cardiopulmonary process  · BNP normal  · Wt today is 191:3 pound weight loss since last visit to now (previous 10 pound weight gain)  · Continue Lasix 20 mg qd  · Check BMP in 2 weeks to assess kidney function  · Repeat Echo which is scheduled for May  · ER precautions given  · RTC in 4 weeks
Statement Selected

## 2023-03-27 NOTE — PROGRESS NOTES
Vandana Staples [facility] [place]   [address]  [place], [address]  Phone#  [phone]  Fax#  [phone]    ASSESSMENT and PLAN  Peripheral edema  · Improved trace pitting edema on exam seems to improved today from 1+  L > R   · Last Echo 4/2019: Normal left ventricular systolic function, EF 87%  Normal left ventricular chamber size  Borderline concentric left ventricular hypertrophy  Normal left ventricular wall motion without regional wall motion abnormalities  Normal left ventricular diastolic function  Normal left atrial pressures  · CXR: No acute cardiopulmonary process  · BNP normal  · Wt today is 191:3 pound weight loss since last visit to now (previous 10 pound weight gain)  · Continue Lasix 20 mg qd  · Check BMP in 2 weeks to assess kidney function  · Repeat Echo which is scheduled for May  · ER precautions given  · RTC in 4 weeks     Essential hypertension  · Current /60, Goal BP <150/90 per JNC 8 guidelines, controlled on repeat  · Current Home Medications: Lisinopril 40 mg, Amlodipine 5 mg   · Per nephro: hold ACEI if dehydration due to GI losses due to risk of LILLY secondary to failure to autoregulate  No evidence of dehydration  · Statin currently used: none, does not wish to start at this time  , ASCVD Risk- 32 3  · Continue Lisinopril 40 mg, Amlodipine 5 mg   · If no improvement in leg swelling/no abnormalities on labs or imaging will consider trialing off of  Amlodipine 5 mg  BP seems to be controlled  Diagnoses and all orders for this visit:    Peripheral edema  -     Basic metabolic panel; Future  -     Magnesium; Future    Essential hypertension      HISTORY OF PRESENT ILLNESS  Natasha Leone  is a 69 y o  male with a significant PMHx of HTN, COPD, History of tobaccoabuse, CKD, Presbycusis who presents to the clinic for  management of their chronic medical conditions  He denies any fevers, chills, chest pain, headaches, nausea, vomiting   Patient has not had any recent hospitalizations, or medical emergencies since last visit  Patient has no further complaints other than what is mentioned in the ROS  Smoking/Alcohol/Illicit Drug Use: quit smoking 2-3 years ago    REVIEW OF SYSTEMS  Review of Systems   Constitutional: Negative for chills, fatigue, fever and unexpected weight change  HENT: Positive for hearing loss  Negative for congestion, rhinorrhea, sinus pressure and sore throat  Eyes: Negative for visual disturbance  Respiratory: Negative for cough, chest tightness, shortness of breath and wheezing  Cardiovascular: Negative for chest pain  Gastrointestinal: Negative for abdominal distention, abdominal pain, blood in stool, constipation, diarrhea, nausea and vomiting  Genitourinary: Negative for difficulty urinating, dysuria, frequency, hematuria and urgency  Musculoskeletal: Negative for back pain and neck pain  Skin: Negative for rash  Allergic/Immunologic: Negative  Neurological: Negative for dizziness, weakness, light-headedness, numbness and headaches  Psychiatric/Behavioral: Negative for behavioral problems       PAST MEDICAL HISTORY   Past Medical History:   Diagnosis Date   • Asthma    • CKD (chronic kidney disease)    • Hypertension    • Microalbuminuria      Past Surgical History:   Procedure Laterality Date   • APPENDECTOMY     • BEDSIDE SPIROMETRY WITH BRONCHODILATOR PRE/POST  2021   • HERNIA REPAIR     • STRABISMUS SURGERY       Social History     Socioeconomic History   • Marital status: Single     Spouse name: Not on file   • Number of children: Not on file   • Years of education: Not on file   • Highest education level: Not on file   Occupational History   • Not on file   Tobacco Use   • Smoking status: Former     Packs/day: 0 50     Years: 53 00     Pack years: 26 50     Types: Cigarettes     Start date:      Quit date:      Years since quittin 2     Passive exposure: Past   • Smokeless tobacco: "Never   Vaping Use   • Vaping Use: Never used   Substance and Sexual Activity   • Alcohol use: Yes     Comment: Rarely; once a year   • Drug use: Never   • Sexual activity: Not on file   Other Topics Concern   • Not on file   Social History Narrative   • Not on file     Social Determinants of Health     Financial Resource Strain: Not on file   Food Insecurity: Not on file   Transportation Needs: Not on file   Physical Activity: Not on file   Stress: Not on file   Social Connections: Not on file   Intimate Partner Violence: Not on file   Housing Stability: Not on file     Family History   Problem Relation Age of Onset   • No Known Problems Mother    • No Known Problems Father      MEDICATIONS    Current Outpatient Medications:   •  albuterol (PROVENTIL HFA,VENTOLIN HFA) 90 mcg/act inhaler, Inhale 2 puffs every 6 (six) hours as needed for wheezing, Disp: 18 g, Rfl: 12  •  amLODIPine (NORVASC) 5 mg tablet, Take 1 tablet (5 mg total) by mouth daily, Disp: 90 tablet, Rfl: 3  •  Cholecalciferol (Vitamin D) 50 MCG (2000 UT) CAPS, Take by mouth daily, Disp: , Rfl:   •  furosemide (LASIX) 20 mg tablet, Take 1 tablet (20 mg total) by mouth daily, Disp: 90 tablet, Rfl: 3  •  glycopyrrolate-formoterol (BEVESPI AEROSPHERE) 9-4 8 MCG/ACT inhaler, Inhale 2 puffs 2 (two) times a day, Disp: 10 7 g, Rfl: 12  •  lisinopril (ZESTRIL) 40 mg tablet, Take 1 tablet (40 mg total) by mouth every evening, Disp: 90 tablet, Rfl: 3    PHYSICAL EXAM  Vitals:    03/27/23 1259   BP: 136/60   BP Location: Left arm   Patient Position: Sitting   Cuff Size: Standard   Pulse: 93   Resp: 18   Temp: 98 °F (36 7 °C)   TempSrc: Temporal   SpO2: 95%   Weight: 86 9 kg (191 lb 8 oz)   Height: 5' 8\" (1 727 m)     Wt Readings from Last 3 Encounters:   03/27/23 86 9 kg (191 lb 8 oz)   03/13/23 88 kg (194 lb)   01/09/23 87 5 kg (193 lb)    , Body mass index is 29 12 kg/m²  Physical Exam  Vitals and nursing note reviewed     Constitutional:       General: He is not " in acute distress  Appearance: He is well-developed  He is not toxic-appearing  HENT:      Head: Normocephalic and atraumatic  Eyes:      Extraocular Movements: Extraocular movements intact  Pupils: Pupils are equal, round, and reactive to light  Cardiovascular:      Rate and Rhythm: Regular rhythm  Tachycardia present  Heart sounds: Normal heart sounds  No murmur heard  Pulmonary:      Effort: Pulmonary effort is normal  No respiratory distress  Breath sounds: No decreased breath sounds, wheezing, rhonchi or rales  Comments: Diminished air entry diffusely   Abdominal:      General: Bowel sounds are normal  There is no distension  Palpations: Abdomen is soft  Tenderness: There is no abdominal tenderness  There is no guarding  Musculoskeletal:         General: Normal range of motion  Cervical back: Normal range of motion and neck supple  Right lower leg: Edema (trace to 1+) present  Left lower leg: Edema (trace) present  Skin:     General: Skin is warm and dry  Neurological:      Mental Status: He is alert and oriented to person, place, and time  Psychiatric:         Behavior: Behavior normal          LABS/IMAGING  Hemoglobin A1C   Date Value Ref Range Status   03/04/2019 5 9 4 2 - 6 3 % Final     HDL, Direct   Date Value Ref Range Status   06/21/2022 47 >=40 mg/dL Final     Comment:     Specimen collection should occur prior to Metamizole administration due to the potential for falsley depressed results       Triglycerides   Date Value Ref Range Status   06/21/2022 169 (H) See Comment mg/dL Final     Comment:     Triglyceride:     0-9Y            <75mg/dL     10Y-17Y         <90 mg/dL       >=18Y     Normal          <150 mg/dL     Borderline High 150-199 mg/dL     High            200-499 mg/dL        Very High       >499 mg/dL    Specimen collection should occur prior to N-Acetylcysteine or Metamizole administration due to the potential for falsely depressed results        WBC   Date Value Ref Range Status   04/22/2021 5 30 4 50 - 11 00 Thousand/uL Final   04/21/2021 6 40 4 50 - 11 00 Thousand/uL Final   04/20/2021 8 70 4 50 - 11 00 Thousand/uL Final     Hemoglobin   Date Value Ref Range Status   04/22/2021 12 4 (L) 13 5 - 17 5 g/dL Final   04/21/2021 12 3 (L) 13 5 - 17 5 g/dL Final   04/20/2021 13 4 (L) 13 5 - 17 5 g/dL Final     Platelets   Date Value Ref Range Status   04/22/2021 285 150 - 450 Thousands/uL Final   04/21/2021 279 150 - 450 Thousands/uL Final   04/20/2021 311 150 - 450 Thousands/uL Final     Potassium   Date Value Ref Range Status   03/13/2023 4 9 3 5 - 5 3 mmol/L Final   12/02/2022 4 6 3 5 - 5 3 mmol/L Final   11/04/2022 5 0 3 5 - 5 3 mmol/L Final     Chloride   Date Value Ref Range Status   03/13/2023 104 96 - 108 mmol/L Final   12/02/2022 102 96 - 108 mmol/L Final   11/04/2022 101 96 - 108 mmol/L Final     CO2   Date Value Ref Range Status   03/13/2023 26 21 - 32 mmol/L Final   12/02/2022 26 21 - 32 mmol/L Final   11/04/2022 28 21 - 32 mmol/L Final     BUN   Date Value Ref Range Status   03/13/2023 23 5 - 25 mg/dL Final   12/02/2022 32 (H) 5 - 25 mg/dL Final   11/04/2022 36 (H) 5 - 25 mg/dL Final     Creatinine   Date Value Ref Range Status   03/13/2023 1 51 (H) 0 70 - 1 50 mg/dL Final     Comment:     Standardized to IDMS reference method   12/02/2022 1 71 (H) 0 70 - 1 50 mg/dL Final     Comment:     Standardized to IDMS reference method   11/04/2022 1 73 (H) 0 70 - 1 50 mg/dL Final     Comment:     Standardized to IDMS reference method     eGFR   Date Value Ref Range Status   03/13/2023 44 ml/min/1 73sq m Final   12/02/2022 38 ml/min/1 73sq m Final   11/04/2022 37 ml/min/1 73sq m Final     Calcium   Date Value Ref Range Status   03/13/2023 9 7 8 4 - 10 2 mg/dL Final   12/02/2022 9 4 8 4 - 10 2 mg/dL Final   11/04/2022 9 6 8 4 - 10 2 mg/dL Final     AST   Date Value Ref Range Status   04/22/2021 26 17 - 59 U/L Final     Comment: Specimen collection should occur prior to Sulfasalazine administration due to the potential for falsely depressed results  04/20/2021 25 17 - 59 U/L Final     Comment:     Specimen collection should occur prior to Sulfasalazine administration due to the potential for falsely depressed results  03/04/2019 20 17 - 59 U/L Final     Comment:       Specimen collection should occur prior to Sulfasalazine administration due to the potential for falsely depressed results  ALT   Date Value Ref Range Status   04/22/2021 22 <50 U/L Final     Comment:     Specimen collection should occur prior to Sulfasalazine administration due to the potential for falsely depressed results  04/20/2021 31 <50 U/L Final     Comment:     Specimen collection should occur prior to Sulfasalazine administration due to the potential for falsely depressed results  03/04/2019 26 9 - 52 U/L Final     Comment:       Specimen collection should occur prior to Sulfasalazine administration due to the potential for falsely depressed results  Alkaline Phosphatase   Date Value Ref Range Status   04/22/2021 84 43 - 122 U/L Final   04/20/2021 99 43 - 122 U/L Final   03/04/2019 102 43 - 122 U/L Final     Magnesium   Date Value Ref Range Status   04/20/2021 1 9 1 6 - 2 3 mg/dL Final   08/12/2020 1 9 1 6 - 2 3 mg/dL Final     No results found for: TSH    This note has been dictated using Codacy software  It may contain errors, including improperly dictated words  Please contact physician directly for any questions       Alexsander Mora   PGY-3

## 2023-03-31 ENCOUNTER — DOCUMENTATION (OUTPATIENT)
Dept: NEPHROLOGY | Facility: CLINIC | Age: 78
End: 2023-03-31

## 2023-04-06 ENCOUNTER — TELEPHONE (OUTPATIENT)
Dept: NEPHROLOGY | Facility: CLINIC | Age: 78
End: 2023-04-06

## 2023-04-06 DIAGNOSIS — E78.5 HYPERLIPIDEMIA, UNSPECIFIED HYPERLIPIDEMIA TYPE: ICD-10-CM

## 2023-04-06 DIAGNOSIS — N18.31 CKD STAGE G3A/A2, GFR 45-59 AND ALBUMIN CREATININE RATIO 30-299 MG/G (HCC): Primary | ICD-10-CM

## 2023-04-06 DIAGNOSIS — R80.1 PERSISTENT PROTEINURIA: ICD-10-CM

## 2023-04-06 DIAGNOSIS — E55.9 VITAMIN D DEFICIENCY: ICD-10-CM

## 2023-04-06 NOTE — TELEPHONE ENCOUNTER
----- Message from JACINDA MOHR MD sent at 4/5/2023  3:27 PM EDT -----  Can you please reach out to the patient and see if we can arrange for a follow-up appointment with nephrology in the next 3 months with me or any of the other nurse practitioners    Please have him repeat renal function panel intact PTH vitamin D prior to the visit

## 2023-04-23 NOTE — PROGRESS NOTES
Kathleen Barboza [facility]   [address], [address]  Phone#  [phone]  Fax#  [phone]    ASSESSMENT and PLAN  Peripheral edema  · Improved trace pitting edema on exam seems to improved today from 1+  L=R   · Last Echo 4/2019: Normal left ventricular systolic function, EF 83%  Normal left ventricular chamber size  Borderline concentric left ventricular hypertrophy  Normal left ventricular wall motion without regional wall motion abnormalities  Normal left ventricular diastolic function  Normal left atrial pressures  · CXR: No acute cardiopulmonary process  · BNP normal  · Wt today is 190 4 pound weight loss since last visit to now (previous 10 pound weight gain)  · Will change Lasix 20 mg back to every other day  · Advised to please obtain blood work by Nephrology   · Repeat Echo which is scheduled for May    Essential hypertension  · Current /60, Goal BP <150/90 per JNC 8 guidelines, controlled on repeat  · Current Home Medications: Lisinopril 40 mg, Amlodipine 5 mg   · Per nephro: hold ACEI if dehydration due to GI losses due to risk of LILLY secondary to failure to autoregulate  No evidence of dehydration  · Statin currently used: none, does not wish to start at this time  , ASCVD Risk- 32 3  · Continue Lisinopril 40 mg, STOP Amlodipine 5 mg secondary to leg swelling and controlled blood pressures   Will trial off       CKD stage G3a/A2, GFR 45-59 and albumin creatinine ratio  mg/g  Lab Results   Component Value Date    EGFR 32 04/05/2023    EGFR 44 03/13/2023    EGFR 38 12/02/2022    CREATININE 1 93 (H) 04/05/2023    CREATININE 1 51 (H) 03/13/2023    CREATININE 1 71 (H) 12/02/2022   · Advised to get repeat BMP  · Avoid nephrotoxic agents  · F/u with Nephrology     Diagnoses and all orders for this visit:    Peripheral edema    Essential hypertension    Screening for lung cancer    Encounter for screening colonoscopy    History of tobacco abuse    CKD stage G3a/A2, GFR 45-59 and albumin creatinine ratio  mg/g      HISTORY OF PRESENT ILLNESS  Irma Prieto  is a 69 y o  male with a significant PMHx of HTN, COPD, History of tobaccoabuse, CKD, Presbycusis who presents to the clinic for  management of their chronic medical conditions  He denies any fevers, chills, chest pain, headaches, nausea, vomiting  Patient has not had any recent hospitalizations, or medical emergencies since last visit  Patient has no further complaints other than what is mentioned in the ROS  Smoking/Alcohol/Illicit Drug Use: quit smoking 2-3 years ago    REVIEW OF SYSTEMS  Review of Systems   Constitutional: Negative for chills, fatigue, fever and unexpected weight change  HENT: Positive for hearing loss  Negative for congestion, rhinorrhea, sinus pressure and sore throat  Eyes: Negative for visual disturbance  Respiratory: Negative for cough, chest tightness, shortness of breath and wheezing  Cardiovascular: Negative for chest pain  Gastrointestinal: Negative for abdominal distention, abdominal pain, blood in stool, constipation, diarrhea, nausea and vomiting  Genitourinary: Negative for difficulty urinating, dysuria, frequency, hematuria and urgency  Musculoskeletal: Negative for back pain and neck pain  Skin: Negative for rash  Allergic/Immunologic: Negative  Neurological: Negative for dizziness, weakness, light-headedness, numbness and headaches  Psychiatric/Behavioral: Negative for behavioral problems       PAST MEDICAL HISTORY   Past Medical History:   Diagnosis Date   • Asthma    • CKD (chronic kidney disease)    • Hypertension    • Microalbuminuria    • Osteopenia 3/1/2018     Past Surgical History:   Procedure Laterality Date   • APPENDECTOMY     • BEDSIDE SPIROMETRY WITH BRONCHODILATOR PRE/POST  4/21/2021   • HERNIA REPAIR     • STRABISMUS SURGERY       Social History     Socioeconomic History   • Marital status: Single Spouse name: Not on file   • Number of children: Not on file   • Years of education: Not on file   • Highest education level: Not on file   Occupational History   • Not on file   Tobacco Use   • Smoking status: Former     Packs/day: 0 50     Years: 53 00     Pack years: 26 50     Types: Cigarettes     Start date:      Quit date:      Years since quittin 3     Passive exposure: Past   • Smokeless tobacco: Never   Vaping Use   • Vaping Use: Never used   Substance and Sexual Activity   • Alcohol use: Yes     Comment: Rarely; once a year   • Drug use: Never   • Sexual activity: Not on file   Other Topics Concern   • Not on file   Social History Narrative   • Not on file     Social Determinants of Health     Financial Resource Strain: Not on file   Food Insecurity: Not on file   Transportation Needs: Not on file   Physical Activity: Not on file   Stress: Not on file   Social Connections: Not on file   Intimate Partner Violence: Not on file   Housing Stability: Not on file     Family History   Problem Relation Age of Onset   • No Known Problems Mother    • No Known Problems Father      MEDICATIONS    Current Outpatient Medications:   •  albuterol (PROVENTIL HFA,VENTOLIN HFA) 90 mcg/act inhaler, Inhale 2 puffs every 6 (six) hours as needed for wheezing, Disp: 18 g, Rfl: 12  •  Cholecalciferol (Vitamin D) 50 MCG ( UT) CAPS, Take by mouth daily, Disp: , Rfl:   •  furosemide (LASIX) 20 mg tablet, Take 1 tablet (20 mg total) by mouth daily, Disp: 90 tablet, Rfl: 3  •  glycopyrrolate-formoterol (BEVESPI AEROSPHERE) 9-4 8 MCG/ACT inhaler, Inhale 2 puffs 2 (two) times a day, Disp: 10 7 g, Rfl: 12  •  lisinopril (ZESTRIL) 40 mg tablet, Take 1 tablet (40 mg total) by mouth every evening, Disp: 90 tablet, Rfl: 3    PHYSICAL EXAM  Vitals:    23 1515   BP: (!) 122/40   BP Location: Left arm   Patient Position: Sitting   Cuff Size: Standard   Pulse: 101   Resp: 18   Temp: 98 °F (36 7 °C)   TempSrc: Temporal "  SpO2: 98%   Weight: 86 2 kg (190 lb)   Height: 5' 8\" (1 727 m)     Wt Readings from Last 3 Encounters:   04/24/23 86 2 kg (190 lb)   03/27/23 86 9 kg (191 lb 8 oz)   03/13/23 88 kg (194 lb)    , Body mass index is 28 89 kg/m²  Physical Exam  Vitals and nursing note reviewed  Constitutional:       General: He is not in acute distress  Appearance: He is well-developed  He is not toxic-appearing  HENT:      Head: Normocephalic and atraumatic  Eyes:      Extraocular Movements: Extraocular movements intact  Pupils: Pupils are equal, round, and reactive to light  Cardiovascular:      Rate and Rhythm: Normal rate and regular rhythm  Heart sounds: Normal heart sounds  No murmur heard  Pulmonary:      Effort: Pulmonary effort is normal  No respiratory distress  Breath sounds: Normal breath sounds  No wheezing, rhonchi or rales  Abdominal:      General: Bowel sounds are normal  There is no distension  Palpations: Abdomen is soft  Tenderness: There is no abdominal tenderness  There is no guarding  Musculoskeletal:         General: Normal range of motion  Cervical back: Normal range of motion and neck supple  Right lower leg: Edema (trace) present  Left lower leg: Edema (trace) present  Skin:     General: Skin is warm and dry  Neurological:      Mental Status: He is alert and oriented to person, place, and time  Psychiatric:         Behavior: Behavior normal          LABS/IMAGING  Hemoglobin A1C   Date Value Ref Range Status   03/04/2019 5 9 4 2 - 6 3 % Final     HDL, Direct   Date Value Ref Range Status   06/21/2022 47 >=40 mg/dL Final     Comment:     Specimen collection should occur prior to Metamizole administration due to the potential for falsley depressed results       Triglycerides   Date Value Ref Range Status   06/21/2022 169 (H) See Comment mg/dL Final     Comment:     Triglyceride:     0-9Y            <75mg/dL     10Y-17Y         <90 mg/dL       " >=18Y     Normal          <150 mg/dL     Borderline High 150-199 mg/dL     High            200-499 mg/dL        Very High       >499 mg/dL    Specimen collection should occur prior to N-Acetylcysteine or Metamizole administration due to the potential for falsely depressed results        WBC   Date Value Ref Range Status   04/22/2021 5 30 4 50 - 11 00 Thousand/uL Final   04/21/2021 6 40 4 50 - 11 00 Thousand/uL Final   04/20/2021 8 70 4 50 - 11 00 Thousand/uL Final     Hemoglobin   Date Value Ref Range Status   04/22/2021 12 4 (L) 13 5 - 17 5 g/dL Final   04/21/2021 12 3 (L) 13 5 - 17 5 g/dL Final   04/20/2021 13 4 (L) 13 5 - 17 5 g/dL Final     Platelets   Date Value Ref Range Status   04/22/2021 285 150 - 450 Thousands/uL Final   04/21/2021 279 150 - 450 Thousands/uL Final   04/20/2021 311 150 - 450 Thousands/uL Final     Potassium   Date Value Ref Range Status   04/05/2023 5 3 3 5 - 5 3 mmol/L Final   03/13/2023 4 9 3 5 - 5 3 mmol/L Final   12/02/2022 4 6 3 5 - 5 3 mmol/L Final     Chloride   Date Value Ref Range Status   04/05/2023 100 96 - 108 mmol/L Final   03/13/2023 104 96 - 108 mmol/L Final   12/02/2022 102 96 - 108 mmol/L Final     CO2   Date Value Ref Range Status   04/05/2023 27 21 - 32 mmol/L Final   03/13/2023 26 21 - 32 mmol/L Final   12/02/2022 26 21 - 32 mmol/L Final     BUN   Date Value Ref Range Status   04/05/2023 40 (H) 5 - 25 mg/dL Final   03/13/2023 23 5 - 25 mg/dL Final   12/02/2022 32 (H) 5 - 25 mg/dL Final     Creatinine   Date Value Ref Range Status   04/05/2023 1 93 (H) 0 60 - 1 30 mg/dL Final     Comment:     Standardized to IDMS reference method   03/13/2023 1 51 (H) 0 70 - 1 50 mg/dL Final     Comment:     Standardized to IDMS reference method   12/02/2022 1 71 (H) 0 70 - 1 50 mg/dL Final     Comment:     Standardized to IDMS reference method     eGFR   Date Value Ref Range Status   04/05/2023 32 ml/min/1 73sq m Final   03/13/2023 44 ml/min/1 73sq m Final   12/02/2022 38 ml/min/1 73sq m Final     Calcium   Date Value Ref Range Status   04/05/2023 10 2 8 4 - 10 2 mg/dL Final   03/13/2023 9 7 8 4 - 10 2 mg/dL Final   12/02/2022 9 4 8 4 - 10 2 mg/dL Final     AST   Date Value Ref Range Status   04/22/2021 26 17 - 59 U/L Final     Comment:     Specimen collection should occur prior to Sulfasalazine administration due to the potential for falsely depressed results  04/20/2021 25 17 - 59 U/L Final     Comment:     Specimen collection should occur prior to Sulfasalazine administration due to the potential for falsely depressed results  03/04/2019 20 17 - 59 U/L Final     Comment:       Specimen collection should occur prior to Sulfasalazine administration due to the potential for falsely depressed results  ALT   Date Value Ref Range Status   04/22/2021 22 <50 U/L Final     Comment:     Specimen collection should occur prior to Sulfasalazine administration due to the potential for falsely depressed results  04/20/2021 31 <50 U/L Final     Comment:     Specimen collection should occur prior to Sulfasalazine administration due to the potential for falsely depressed results  03/04/2019 26 9 - 52 U/L Final     Comment:       Specimen collection should occur prior to Sulfasalazine administration due to the potential for falsely depressed results  Alkaline Phosphatase   Date Value Ref Range Status   04/22/2021 84 43 - 122 U/L Final   04/20/2021 99 43 - 122 U/L Final   03/04/2019 102 43 - 122 U/L Final     Magnesium   Date Value Ref Range Status   04/05/2023 1 9 1 9 - 2 7 mg/dL Final   04/20/2021 1 9 1 6 - 2 3 mg/dL Final   08/12/2020 1 9 1 6 - 2 3 mg/dL Final     No results found for: TSH    This note has been dictated using Zappedy software  It may contain errors, including improperly dictated words  Please contact physician directly for any questions       Eron Huynh   PGY-3

## 2023-04-23 NOTE — ASSESSMENT & PLAN NOTE
· Improved trace pitting edema on exam seems to improved today from 1+  L=R   · Last Echo 4/2019: Normal left ventricular systolic function, EF 40%  Normal left ventricular chamber size  Borderline concentric left ventricular hypertrophy  Normal left ventricular wall motion without regional wall motion abnormalities  Normal left ventricular diastolic function  Normal left atrial pressures    · CXR: No acute cardiopulmonary process  · BNP normal  · Wt today is 190 4 pound weight loss since last visit to now (previous 10 pound weight gain)  · Will change Lasix 20 mg back to every other day  · Advised to please obtain blood work by Nephrology   · Repeat Echo which is scheduled for May

## 2023-04-24 ENCOUNTER — OFFICE VISIT (OUTPATIENT)
Dept: FAMILY MEDICINE CLINIC | Facility: CLINIC | Age: 78
End: 2023-04-24

## 2023-04-24 VITALS
TEMPERATURE: 98 F | DIASTOLIC BLOOD PRESSURE: 40 MMHG | WEIGHT: 190 LBS | OXYGEN SATURATION: 98 % | SYSTOLIC BLOOD PRESSURE: 122 MMHG | HEIGHT: 68 IN | HEART RATE: 101 BPM | BODY MASS INDEX: 28.79 KG/M2 | RESPIRATION RATE: 18 BRPM

## 2023-04-24 DIAGNOSIS — Z87.891 HISTORY OF TOBACCO ABUSE: ICD-10-CM

## 2023-04-24 DIAGNOSIS — Z12.2 SCREENING FOR LUNG CANCER: ICD-10-CM

## 2023-04-24 DIAGNOSIS — N18.31 CKD STAGE G3A/A2, GFR 45-59 AND ALBUMIN CREATININE RATIO 30-299 MG/G (HCC): ICD-10-CM

## 2023-04-24 DIAGNOSIS — I10 ESSENTIAL HYPERTENSION: ICD-10-CM

## 2023-04-24 DIAGNOSIS — R60.9 PERIPHERAL EDEMA: Primary | ICD-10-CM

## 2023-04-24 DIAGNOSIS — Z12.11 ENCOUNTER FOR SCREENING COLONOSCOPY: ICD-10-CM

## 2023-04-24 PROBLEM — M85.80 OSTEOPENIA: Status: RESOLVED | Noted: 2018-03-01 | Resolved: 2023-04-24

## 2023-04-24 NOTE — ASSESSMENT & PLAN NOTE
· Current /60, Goal BP <150/90 per JNC 8 guidelines, controlled on repeat  · Current Home Medications: Lisinopril 40 mg, Amlodipine 5 mg   · Per nephro: hold ACEI if dehydration due to GI losses due to risk of LILLY secondary to failure to autoregulate  No evidence of dehydration  · Statin currently used: none, does not wish to start at this time  , ASCVD Risk- 32 3  · Continue Lisinopril 40 mg, STOP Amlodipine 5 mg secondary to leg swelling and controlled blood pressures   Will trial off

## 2023-04-24 NOTE — ASSESSMENT & PLAN NOTE
Lab Results   Component Value Date    EGFR 32 04/05/2023    EGFR 44 03/13/2023    EGFR 38 12/02/2022    CREATININE 1 93 (H) 04/05/2023    CREATININE 1 51 (H) 03/13/2023    CREATININE 1 71 (H) 12/02/2022   · Advised to get repeat BMP  · Avoid nephrotoxic agents  · F/u with Nephrology

## 2023-04-24 NOTE — ASSESSMENT & PLAN NOTE
Lab Results   Component Value Date    EGFR 32 04/05/2023    EGFR 44 03/13/2023    EGFR 38 12/02/2022    CREATININE 1 93 (H) 04/05/2023    CREATININE 1 51 (H) 03/13/2023    CREATININE 1 71 (H) 12/02/2022

## 2023-04-26 ENCOUNTER — TELEPHONE (OUTPATIENT)
Dept: FAMILY MEDICINE CLINIC | Facility: CLINIC | Age: 78
End: 2023-04-26

## 2023-04-26 NOTE — TELEPHONE ENCOUNTER
Patient came in and requested refill for following medications        furosemide (LASIX) 20 mg tablet     lisinopril (ZESTRIL) 40 mg tablet     glycopyrrolate-formoterol (BEVESPI AEROSPHERE) 9-4 8 MCG/ACT inhaler

## 2023-04-27 DIAGNOSIS — J44.9 CHRONIC OBSTRUCTIVE PULMONARY DISEASE, UNSPECIFIED COPD TYPE (HCC): ICD-10-CM

## 2023-04-27 DIAGNOSIS — N18.31 CKD STAGE G3A/A2, GFR 45-59 AND ALBUMIN CREATININE RATIO 30-299 MG/G (HCC): ICD-10-CM

## 2023-04-27 DIAGNOSIS — E78.5 HYPERLIPIDEMIA, UNSPECIFIED HYPERLIPIDEMIA TYPE: ICD-10-CM

## 2023-04-27 DIAGNOSIS — N28.1 CYST OF KIDNEY, ACQUIRED: ICD-10-CM

## 2023-04-27 DIAGNOSIS — R80.1 PERSISTENT PROTEINURIA: ICD-10-CM

## 2023-04-27 DIAGNOSIS — E55.9 VITAMIN D DEFICIENCY: ICD-10-CM

## 2023-04-27 DIAGNOSIS — I10 ESSENTIAL HYPERTENSION: ICD-10-CM

## 2023-04-27 RX ORDER — FUROSEMIDE 20 MG/1
20 TABLET ORAL DAILY
Qty: 90 TABLET | Refills: 3 | Status: SHIPPED | OUTPATIENT
Start: 2023-04-27

## 2023-04-27 RX ORDER — LISINOPRIL 40 MG/1
40 TABLET ORAL EVERY EVENING
Qty: 90 TABLET | Refills: 3 | Status: SHIPPED | OUTPATIENT
Start: 2023-04-27

## 2023-04-28 ENCOUNTER — APPOINTMENT (OUTPATIENT)
Dept: LAB | Facility: HOSPITAL | Age: 78
End: 2023-04-28

## 2023-04-28 DIAGNOSIS — R80.1 PERSISTENT PROTEINURIA: ICD-10-CM

## 2023-04-28 DIAGNOSIS — E55.9 VITAMIN D DEFICIENCY: ICD-10-CM

## 2023-04-28 DIAGNOSIS — N18.31 CKD STAGE G3A/A2, GFR 45-59 AND ALBUMIN CREATININE RATIO 30-299 MG/G (HCC): ICD-10-CM

## 2023-04-28 DIAGNOSIS — E78.5 HYPERLIPIDEMIA, UNSPECIFIED HYPERLIPIDEMIA TYPE: ICD-10-CM

## 2023-04-28 LAB — PTH-INTACT SERPL-MCNC: 52.9 PG/ML (ref 18.4–80.1)

## 2023-05-06 LAB — RENIN PLAS-CCNC: 59.47 NG/ML/HR (ref 0.17–5.38)

## 2023-05-26 ENCOUNTER — HOSPITAL ENCOUNTER (OUTPATIENT)
Dept: NON INVASIVE DIAGNOSTICS | Facility: HOSPITAL | Age: 78
Discharge: HOME | End: 2023-05-26

## 2023-05-26 VITALS
WEIGHT: 190 LBS | DIASTOLIC BLOOD PRESSURE: 40 MMHG | HEART RATE: 70 BPM | BODY MASS INDEX: 28.79 KG/M2 | SYSTOLIC BLOOD PRESSURE: 122 MMHG | HEIGHT: 68 IN

## 2023-05-26 DIAGNOSIS — R60.9 PERIPHERAL EDEMA: ICD-10-CM

## 2023-05-26 PROBLEM — Z00.00 HEALTHCARE MAINTENANCE: Status: RESOLVED | Noted: 2022-01-18 | Resolved: 2023-05-26

## 2023-05-26 LAB
AORTIC ROOT: 3.7 CM
AORTIC VALVE MEAN VELOCITY: 9.6 M/S
APICAL FOUR CHAMBER EJECTION FRACTION: 79 %
ASCENDING AORTA: 3.5 CM
AV LVOT MEAN GRADIENT: 4 MMHG
AV LVOT PEAK GRADIENT: 8 MMHG
AV MEAN GRADIENT: 4 MMHG
AV PEAK GRADIENT: 8 MMHG
AV REGURGITATION PRESSURE HALF TIME: 501 MS
AV VELOCITY RATIO: 1.02
DOP CALC AO PEAK VEL: 1.37 M/S
DOP CALC AO VTI: 29.95 CM
DOP CALC LVOT PEAK VEL VTI: 30.94 CM
DOP CALC LVOT PEAK VEL: 1.4 M/S
E WAVE DECELERATION TIME: 169 MS
FRACTIONAL SHORTENING: 37 % (ref 28–44)
INTERVENTRICULAR SEPTUM IN DIASTOLE (PARASTERNAL SHORT AXIS VIEW): 1 CM
INTERVENTRICULAR SEPTUM: 1 CM (ref 0.6–1.1)
LAAS-AP4: 18.8 CM2
LEFT ATRIUM SIZE: 4.2 CM
LEFT INTERNAL DIMENSION IN SYSTOLE: 2.9 CM (ref 2.1–4)
LEFT VENTRICULAR INTERNAL DIMENSION IN DIASTOLE: 4.6 CM (ref 3.5–6)
LEFT VENTRICULAR POSTERIOR WALL IN END DIASTOLE: 1 CM
LEFT VENTRICULAR STROKE VOLUME: 63 ML
LVSV (TEICH): 63 ML
MV E'TISSUE VEL-SEP: 8 CM/S
MV PEAK A VEL: 0.95 M/S
MV PEAK E VEL: 81 CM/S
MV STENOSIS PRESSURE HALF TIME: 49 MS
MV VALVE AREA P 1/2 METHOD: 4.49 CM2
RIGHT VENTRICLE ID DIMENSION: 4.5 CM
SL CV AV DECELERATION TIME RETROGRADE: 1727 MS
SL CV AV PEAK GRADIENT RETROGRADE: 75 MMHG
SL CV PED ECHO LEFT VENTRICLE DIASTOLIC VOLUME (MOD BIPLANE) 2D: 97 ML
SL CV PED ECHO LEFT VENTRICLE SYSTOLIC VOLUME (MOD BIPLANE) 2D: 33 ML
TR MAX PG: 26 MMHG
TR PEAK VELOCITY: 2.6 M/S
TRICUSPID ANNULAR PLANE SYSTOLIC EXCURSION: 2.5 CM
TRICUSPID VALVE PEAK REGURGITATION VELOCITY: 2.57 M/S

## 2023-06-05 PROBLEM — E55.9 VITAMIN D DEFICIENCY: Status: RESOLVED | Noted: 2020-09-02 | Resolved: 2023-06-05

## 2023-06-05 PROBLEM — R00.0 TACHYCARDIA: Status: RESOLVED | Noted: 2019-02-12 | Resolved: 2023-06-05

## 2023-06-12 ENCOUNTER — OFFICE VISIT (OUTPATIENT)
Dept: FAMILY MEDICINE CLINIC | Facility: CLINIC | Age: 78
End: 2023-06-12

## 2023-06-12 VITALS
TEMPERATURE: 98.6 F | OXYGEN SATURATION: 93 % | SYSTOLIC BLOOD PRESSURE: 154 MMHG | WEIGHT: 190.6 LBS | HEIGHT: 68 IN | RESPIRATION RATE: 18 BRPM | HEART RATE: 120 BPM | BODY MASS INDEX: 28.89 KG/M2 | DIASTOLIC BLOOD PRESSURE: 54 MMHG

## 2023-06-12 DIAGNOSIS — R60.9 PERIPHERAL EDEMA: ICD-10-CM

## 2023-06-12 DIAGNOSIS — I10 ESSENTIAL HYPERTENSION: ICD-10-CM

## 2023-06-12 DIAGNOSIS — N40.0 PROSTATE ENLARGEMENT: ICD-10-CM

## 2023-06-12 DIAGNOSIS — N18.31 CKD STAGE G3A/A2, GFR 45-59 AND ALBUMIN CREATININE RATIO 30-299 MG/G (HCC): ICD-10-CM

## 2023-06-12 DIAGNOSIS — J44.1 CHRONIC OBSTRUCTIVE PULMONARY DISEASE WITH ACUTE EXACERBATION (HCC): Primary | ICD-10-CM

## 2023-06-12 PROBLEM — N28.1 CYST OF KIDNEY, ACQUIRED: Status: RESOLVED | Noted: 2020-05-27 | Resolved: 2023-06-12

## 2023-06-12 PROCEDURE — 99213 OFFICE O/P EST LOW 20 MIN: CPT | Performed by: FAMILY MEDICINE

## 2023-06-12 NOTE — PROGRESS NOTES
[facility]   [address]  [place], [address]  Phone#  [phone]  Fax#  [phone]    ASSESSMENT and PLAN  Chronic obstructive pulmonary disease with acute exacerbation ([address] )  · Last exacerbation 1/2023 treated with Prednisone and Azithromycin   · O2- 95% on room air  · Saw pulmonology after last visit who recommended to continue current management  FEV/FVC ratio 45%  There is questions on his most recent pulmonary function test if you does have true COPD versus asthma at this time  Will follow-up with him in 1 year  · Always short of breath on exertion when walking long distances   · Physical exam much  without any wheezes rhonchi or rales  · Continue home Bevespi 2 puff BID with Albuterol prn  · Would recommend f/u with Pulmonology         Essential hypertension  · Current /60, Goal BP <150/90 per JNC 8 guidelines, controlled on repeat  · Current Home Medications: Lisinopril 40 mg, Amlodipine 5 mg   · Per nephro: hold ACEI if dehydration due to GI losses due to risk of LILLY secondary to failure to autoregulate  No evidence of dehydration  · Statin currently used: none, does not wish to start at this time  , ASCVD Risk- 27 5  · Continue Lisinopril 40 mg      CKD stage G3a/A2, GFR 45-59 and albumin creatinine ratio  mg/g  Lab Results   Component Value Date    CREATININE 1 80 (H) 04/28/2023    CREATININE 1 93 (H) 04/05/2023    CREATININE 1 51 (H) 03/13/2023    EGFR 35 04/28/2023    EGFR 32 04/05/2023    EGFR 44 03/13/2023   · Avoid nephrotoxic agents  · F/u with Nephrology     Peripheral edema  · Resolved    · Echo 5/2023: Normal left ventricle size and systolic function, early, compensated grade 1 diastolic dysfunction  Left ventricle ejection fraction is estimated around 65%  Normal right ventricle size and systolic function    · CXR: No acute cardiopulmonary process  · BNP normal  · Wt today is 190 4 pound weight loss since last visit to now (previous 10 pound weight gain)  · Echo 5/2023: EF 65%, Grade 1 DDF, NO significant change from previous Echo  · Continue Lasix 20 mg back to every other day  · Previous US Kidney and Bladder showed enlarged prostate  · Will order repeat US and PSA    Diagnoses and all orders for this visit:    Chronic obstructive pulmonary disease with acute exacerbation ([facility] [address] )  -     US kidney and bladder; Future    Essential hypertension    Peripheral edema  -     US kidney and bladder; Future    Prostate enlargement  -     PSA, total and free; Future    CKD stage G3a/A2, GFR 45-59 and albumin creatinine ratio  mg/g      HISTORY OF PRESENT ILLNESS  Wen Prajapati  is a 69 y o  male with a significant PMHx of HTN, COPD, History of tobaccoabuse, CKD, Presbycusis who presents to the clinic for  management of their chronic medical conditions  He denies any fevers, chills, chest pain, headaches, nausea, vomiting  Patient has not had any recent hospitalizations, or medical emergencies since last visit  Patient has no further complaints other than what is mentioned in the ROS  Smoking/Alcohol/Illicit Drug Use: quit smoking 2-3 years ago    REVIEW OF SYSTEMS  Review of Systems   Constitutional: Negative for chills, fatigue, fever and unexpected weight change  HENT: Positive for hearing loss  Negative for congestion, rhinorrhea, sinus pressure and sore throat  Eyes: Negative for visual disturbance  Respiratory: Negative for cough, chest tightness, shortness of breath and wheezing  Cardiovascular: Negative for chest pain  Gastrointestinal: Negative for abdominal distention, abdominal pain, blood in stool, constipation, diarrhea, nausea and vomiting  Genitourinary: Negative for difficulty urinating, dysuria, frequency, hematuria and urgency  Musculoskeletal: Negative for back pain and neck pain  Skin: Negative for rash  Allergic/Immunologic: Negative      Neurological: Negative for dizziness, weakness, light-headedness, numbness and headaches  Psychiatric/Behavioral: Negative for behavioral problems  PAST MEDICAL HISTORY   Past Medical History:   Diagnosis Date   • Asthma    • CKD (chronic kidney disease)    • Hypertension    • Microalbuminuria    • Osteopenia 3/1/2018   • Tachycardia 2019   • Vitamin D deficiency 2020     Past Surgical History:   Procedure Laterality Date   • APPENDECTOMY     • BEDSIDE SPIROMETRY WITH BRONCHODILATOR PRE/POST  2021   • HERNIA REPAIR     • STRABISMUS SURGERY       Social History     Socioeconomic History   • Marital status: Single     Spouse name: Not on file   • Number of children: Not on file   • Years of education: Not on file   • Highest education level: Not on file   Occupational History   • Not on file   Tobacco Use   • Smoking status: Former     Packs/day: 0 50     Years: 53 00     Total pack years: 26 50     Types: Cigarettes     Start date:      Quit date:      Years since quittin 4     Passive exposure: Past   • Smokeless tobacco: Never   Vaping Use   • Vaping Use: Never used   Substance and Sexual Activity   • Alcohol use: Yes     Comment: Rarely; once a year   • Drug use: Never   • Sexual activity: Not on file   Other Topics Concern   • Not on file   Social History Narrative   • Not on file     Social Determinants of Health     Financial Resource Strain: Low Risk  (2022)    Overall Financial Resource Strain (CARDIA)    • Difficulty of Paying Living Expenses: Not hard at all   Food Insecurity: No Food Insecurity (2022)    Hunger Vital Sign    • Worried About Running Out of Food in the Last Year: Never true    • Ran Out of Food in the Last Year: Never true   Transportation Needs: No Transportation Needs (2022)    PRAPARE - Transportation    • Lack of Transportation (Medical): No    • Lack of Transportation (Non-Medical):  No   Physical Activity: Not on file   Stress: Not on file   Social Connections: Not on file   Intimate "Partner Violence: Not on file   Housing Stability: Not on file     Family History   Problem Relation Age of Onset   • No Known Problems Mother    • No Known Problems Father      MEDICATIONS    Current Outpatient Medications:   •  albuterol (PROVENTIL HFA,VENTOLIN HFA) 90 mcg/act inhaler, Inhale 2 puffs every 6 (six) hours as needed for wheezing, Disp: 18 g, Rfl: 12  •  Cholecalciferol (Vitamin D) 50 MCG (2000 UT) CAPS, Take by mouth daily, Disp: , Rfl:   •  furosemide (LASIX) 20 mg tablet, Take 1 tablet (20 mg total) by mouth daily, Disp: 90 tablet, Rfl: 3  •  glycopyrrolate-formoterol (BEVESPI AEROSPHERE) 9-4 8 MCG/ACT inhaler, Inhale 2 puffs 2 (two) times a day, Disp: 10 7 g, Rfl: 12  •  lisinopril (ZESTRIL) 40 mg tablet, Take 1 tablet (40 mg total) by mouth every evening, Disp: 90 tablet, Rfl: 3    PHYSICAL EXAM  Vitals:    06/12/23 1526   BP: 154/54   BP Location: Right arm   Patient Position: Sitting   Cuff Size: Standard   Pulse: (!) 120   Resp: 18   Temp: 98 6 °F (37 °C)   TempSrc: Temporal   SpO2: 93%   Weight: 86 5 kg (190 lb 9 6 oz)   Height: 5' 8\" (1 727 m)     Wt Readings from Last 3 Encounters:   06/12/23 86 5 kg (190 lb 9 6 oz)   05/26/23 86 2 kg (190 lb)   04/24/23 86 2 kg (190 lb)    , Body mass index is 28 98 kg/m²  Physical Exam  Vitals and nursing note reviewed  Constitutional:       General: He is not in acute distress  Appearance: He is well-developed  He is not toxic-appearing  HENT:      Head: Normocephalic and atraumatic  Eyes:      Extraocular Movements: Extraocular movements intact  Pupils: Pupils are equal, round, and reactive to light  Cardiovascular:      Rate and Rhythm: Normal rate and regular rhythm  Heart sounds: Normal heart sounds  No murmur heard  Pulmonary:      Effort: Pulmonary effort is normal  No respiratory distress  Breath sounds: Normal breath sounds  No wheezing, rhonchi or rales     Abdominal:      General: Bowel sounds are normal  There is no " distension  Palpations: Abdomen is soft  Tenderness: There is no abdominal tenderness  There is no guarding  Musculoskeletal:         General: Normal range of motion  Cervical back: Normal range of motion and neck supple  Right lower leg: No edema  Left lower leg: No edema  Skin:     General: Skin is warm and dry  Neurological:      Mental Status: He is alert and oriented to person, place, and time  Psychiatric:         Behavior: Behavior normal          LABS/IMAGING  Hemoglobin A1C   Date Value Ref Range Status   03/04/2019 5 9 4 2 - 6 3 % Final     HDL, Direct   Date Value Ref Range Status   06/21/2022 47 >=40 mg/dL Final     Comment:     Specimen collection should occur prior to Metamizole administration due to the potential for falsley depressed results  Triglycerides   Date Value Ref Range Status   06/21/2022 169 (H) See Comment mg/dL Final     Comment:     Triglyceride:     0-9Y            <75mg/dL     10Y-17Y         <90 mg/dL       >=18Y     Normal          <150 mg/dL     Borderline High 150-199 mg/dL     High            200-499 mg/dL        Very High       >499 mg/dL    Specimen collection should occur prior to N-Acetylcysteine or Metamizole administration due to the potential for falsely depressed results        Hemoglobin   Date Value Ref Range Status   04/22/2021 12 4 (L) 13 5 - 17 5 g/dL Final   04/21/2021 12 3 (L) 13 5 - 17 5 g/dL Final   04/20/2021 13 4 (L) 13 5 - 17 5 g/dL Final     Platelets   Date Value Ref Range Status   04/22/2021 285 150 - 450 Thousands/uL Final   04/21/2021 279 150 - 450 Thousands/uL Final   04/20/2021 311 150 - 450 Thousands/uL Final     WBC   Date Value Ref Range Status   04/22/2021 5 30 4 50 - 11 00 Thousand/uL Final   04/21/2021 6 40 4 50 - 11 00 Thousand/uL Final   04/20/2021 8 70 4 50 - 11 00 Thousand/uL Final     Alkaline Phosphatase   Date Value Ref Range Status   04/22/2021 84 43 - 122 U/L Final   04/20/2021 99 43 - 122 U/L Final 03/04/2019 102 43 - 122 U/L Final     ALT   Date Value Ref Range Status   04/22/2021 22 <50 U/L Final     Comment:     Specimen collection should occur prior to Sulfasalazine administration due to the potential for falsely depressed results  04/20/2021 31 <50 U/L Final     Comment:     Specimen collection should occur prior to Sulfasalazine administration due to the potential for falsely depressed results  03/04/2019 26 9 - 52 U/L Final     Comment:       Specimen collection should occur prior to Sulfasalazine administration due to the potential for falsely depressed results  AST   Date Value Ref Range Status   04/22/2021 26 17 - 59 U/L Final     Comment:     Specimen collection should occur prior to Sulfasalazine administration due to the potential for falsely depressed results  04/20/2021 25 17 - 59 U/L Final     Comment:     Specimen collection should occur prior to Sulfasalazine administration due to the potential for falsely depressed results  03/04/2019 20 17 - 59 U/L Final     Comment:       Specimen collection should occur prior to Sulfasalazine administration due to the potential for falsely depressed results        BUN   Date Value Ref Range Status   04/28/2023 32 (H) 5 - 25 mg/dL Final   04/05/2023 40 (H) 5 - 25 mg/dL Final   03/13/2023 23 5 - 25 mg/dL Final     Calcium   Date Value Ref Range Status   04/28/2023 9 5 8 4 - 10 2 mg/dL Final   04/05/2023 10 2 8 4 - 10 2 mg/dL Final   03/13/2023 9 7 8 4 - 10 2 mg/dL Final     Chloride   Date Value Ref Range Status   04/28/2023 104 96 - 108 mmol/L Final   04/05/2023 100 96 - 108 mmol/L Final   03/13/2023 104 96 - 108 mmol/L Final     CO2   Date Value Ref Range Status   04/28/2023 23 21 - 32 mmol/L Final   04/05/2023 27 21 - 32 mmol/L Final   03/13/2023 26 21 - 32 mmol/L Final     Creatinine   Date Value Ref Range Status   04/28/2023 1 80 (H) 0 60 - 1 30 mg/dL Final     Comment:     Standardized to IDMS reference method   04/05/2023 1 93 (H) "0 60 - 1 30 mg/dL Final     Comment:     Standardized to IDMS reference method   03/13/2023 1 51 (H) 0 70 - 1 50 mg/dL Final     Comment:     Standardized to IDMS reference method     eGFR   Date Value Ref Range Status   04/28/2023 35 ml/min/1 73sq m Final   04/05/2023 32 ml/min/1 73sq m Final   03/13/2023 44 ml/min/1 73sq m Final     Potassium   Date Value Ref Range Status   04/28/2023 4 6 3 5 - 5 3 mmol/L Final   04/05/2023 5 3 3 5 - 5 3 mmol/L Final   03/13/2023 4 9 3 5 - 5 3 mmol/L Final     Magnesium   Date Value Ref Range Status   04/05/2023 1 9 1 9 - 2 7 mg/dL Final   04/20/2021 1 9 1 6 - 2 3 mg/dL Final   08/12/2020 1 9 1 6 - 2 3 mg/dL Final     No results found for: \"TSH\"    This note has been dictated using Digitick software  It may contain errors, including improperly dictated words  Please contact physician directly for any questions  Meribeth Race   PGY-3    BMI Counseling: Body mass index is 28 98 kg/m²  The BMI is above normal  Nutrition recommendations include decreasing overall calorie intake, 3-5 servings of fruits/vegetables daily, reducing fast food intake and consuming healthier snacks    "

## 2023-06-12 NOTE — ASSESSMENT & PLAN NOTE
Lab Results   Component Value Date    CREATININE 1 80 (H) 04/28/2023    CREATININE 1 93 (H) 04/05/2023    CREATININE 1 51 (H) 03/13/2023    EGFR 35 04/28/2023    EGFR 32 04/05/2023    EGFR 44 03/13/2023   · Avoid nephrotoxic agents  · F/u with Nephrology

## 2023-06-12 NOTE — ASSESSMENT & PLAN NOTE
· Current /60, Goal BP <150/90 per JNC 8 guidelines, controlled on repeat  · Current Home Medications: Lisinopril 40 mg, Amlodipine 5 mg   · Per nephro: hold ACEI if dehydration due to GI losses due to risk of LILLY secondary to failure to autoregulate   No evidence of dehydration  · Statin currently used: none, does not wish to start at this time  , ASCVD Risk- 27 5  · Continue Lisinopril 40 mg

## 2023-06-12 NOTE — ASSESSMENT & PLAN NOTE
· Resolved    · Echo 5/2023: Normal left ventricle size and systolic function, early, compensated grade 1 diastolic dysfunction  Left ventricle ejection fraction is estimated around 65%  Normal right ventricle size and systolic function    · CXR: No acute cardiopulmonary process  · BNP normal  · Wt today is 190 4 pound weight loss since last visit to now (previous 10 pound weight gain)  · Echo 5/2023: EF 65%, Grade 1 DDF, NO significant change from previous Echo  · Continue Lasix 20 mg back to every other day  · Previous US Kidney and Bladder showed enlarged prostate  · Will order repeat US and PSA

## 2023-06-24 ENCOUNTER — HOSPITAL ENCOUNTER (OUTPATIENT)
Dept: ULTRASOUND IMAGING | Facility: HOSPITAL | Age: 78
Discharge: HOME | End: 2023-06-24
Payer: MEDICARE

## 2023-06-24 DIAGNOSIS — R60.9 PERIPHERAL EDEMA: ICD-10-CM

## 2023-06-24 DIAGNOSIS — J44.1 CHRONIC OBSTRUCTIVE PULMONARY DISEASE WITH ACUTE EXACERBATION (HCC): ICD-10-CM

## 2023-06-24 PROCEDURE — 76775 US EXAM ABDO BACK WALL LIM: CPT

## 2023-07-24 ENCOUNTER — OFFICE VISIT (OUTPATIENT)
Dept: FAMILY MEDICINE CLINIC | Facility: CLINIC | Age: 78
End: 2023-07-24

## 2023-07-24 ENCOUNTER — APPOINTMENT (OUTPATIENT)
Dept: LAB | Facility: HOSPITAL | Age: 78
End: 2023-07-24
Payer: MEDICARE

## 2023-07-24 VITALS
BODY MASS INDEX: 28.34 KG/M2 | OXYGEN SATURATION: 94 % | TEMPERATURE: 98.2 F | HEART RATE: 108 BPM | WEIGHT: 187 LBS | RESPIRATION RATE: 16 BRPM | DIASTOLIC BLOOD PRESSURE: 50 MMHG | HEIGHT: 68 IN | SYSTOLIC BLOOD PRESSURE: 126 MMHG

## 2023-07-24 DIAGNOSIS — N18.31 CKD STAGE G3A/A2, GFR 45-59 AND ALBUMIN CREATININE RATIO 30-299 MG/G (HCC): ICD-10-CM

## 2023-07-24 DIAGNOSIS — J44.1 CHRONIC OBSTRUCTIVE PULMONARY DISEASE WITH ACUTE EXACERBATION (HCC): Primary | ICD-10-CM

## 2023-07-24 DIAGNOSIS — N40.0 PROSTATE ENLARGEMENT: ICD-10-CM

## 2023-07-24 DIAGNOSIS — I10 ESSENTIAL HYPERTENSION: ICD-10-CM

## 2023-07-24 PROCEDURE — 84154 ASSAY OF PSA FREE: CPT

## 2023-07-24 PROCEDURE — 84153 ASSAY OF PSA TOTAL: CPT

## 2023-07-24 PROCEDURE — 36415 COLL VENOUS BLD VENIPUNCTURE: CPT

## 2023-07-24 PROCEDURE — 99214 OFFICE O/P EST MOD 30 MIN: CPT | Performed by: FAMILY MEDICINE

## 2023-07-24 NOTE — PROGRESS NOTES
Name: Bob Dotson. : 1945      MRN: [id number]  Encounter Provider: Paul Nguyen MD  Encounter Date: 2023   Encounter department: [address] [facility]     1. Chronic obstructive pulmonary disease with acute exacerbation (HCC)  Assessment & Plan:  Stable  -Continue albuterol prn and Bevespi 2 puffs BID  -Up to date on vaccinations      2. Essential hypertension  Assessment & Plan:  Well controlled  Continue lisinopril      3. CKD stage G3a/A2, GFR 45-59 and albumin creatinine ratio  mg/g  Assessment & Plan:  Lab Results   Component Value Date    EGFR 35 2023    EGFR 32 2023    EGFR 44 2023    CREATININE 1.80 (H) 2023    CREATININE 1.93 (H) 2023    CREATININE 1.51 (H) 2023   Avoid nephrotoxins  Follow-up with nephrology outpatient             Subjective      67-year-old male with a past medical history of COPD, hypertension, and hyperlipidemia who presents today for follow-up. He has no acute concerns today. He is at his baseline state of health. He denies any recent hospitalization. He is compliant with his medication. Review of Systems   Constitutional: Negative for chills, diaphoresis, fatigue, fever and unexpected weight change. HENT: Positive for hearing loss. Negative for rhinorrhea, sinus pressure and sore throat. Eyes: Negative for visual disturbance. Respiratory: Negative for cough, chest tightness, shortness of breath and wheezing. Cardiovascular: Negative for chest pain, palpitations and leg swelling. Gastrointestinal: Negative for abdominal distention, abdominal pain, blood in stool, constipation, diarrhea, nausea and vomiting. Genitourinary: Negative for difficulty urinating, dysuria, frequency, hematuria and urgency. Musculoskeletal: Negative for back pain. Skin: Negative for rash. Allergic/Immunologic: Negative.     Neurological: Negative for dizziness, syncope, weakness, numbness and headaches. Psychiatric/Behavioral: Negative for behavioral problems. Current Outpatient Medications on File Prior to Visit   Medication Sig   • albuterol (PROVENTIL HFA,VENTOLIN HFA) 90 mcg/act inhaler Inhale 2 puffs every 6 (six) hours as needed for wheezing   • Cholecalciferol (Vitamin D) 50 MCG (2000 UT) CAPS Take by mouth daily   • furosemide (LASIX) 20 mg tablet Take 1 tablet (20 mg total) by mouth daily   • glycopyrrolate-formoterol (BEVESPI AEROSPHERE) 9-4.8 MCG/ACT inhaler Inhale 2 puffs 2 (two) times a day   • lisinopril (ZESTRIL) 40 mg tablet Take 1 tablet (40 mg total) by mouth every evening   • [DISCONTINUED] ergocalciferol (ERGOCALCIFEROL) 1.25 MG (92230 UT) capsule Take 1 capsule (50,000 Units total) by mouth once a week (Patient not taking: No sig reported)       Objective     /50 (BP Location: Left arm, Patient Position: Sitting, Cuff Size: Standard)   Pulse (!) 108   Temp 98.2 °F (36.8 °C) (Temporal)   Resp 16   Ht 5' 8" (1.727 m)   Wt 84.8 kg (187 lb)   SpO2 94%   BMI 28.43 kg/m²     Physical Exam  Constitutional:       General: He is not in acute distress. Appearance: Normal appearance. He is well-developed. He is not ill-appearing, toxic-appearing or diaphoretic. HENT:      Head: Normocephalic and atraumatic. Right Ear: External ear normal.      Left Ear: External ear normal.      Nose: Nose normal.      Mouth/Throat:      Pharynx: No oropharyngeal exudate or posterior oropharyngeal erythema. Eyes:      General: No scleral icterus. Right eye: No discharge. Left eye: No discharge. Extraocular Movements: Extraocular movements intact. Conjunctiva/sclera: Conjunctivae normal.      Pupils: Pupils are equal, round, and reactive to light. Cardiovascular:      Rate and Rhythm: Normal rate and regular rhythm. Heart sounds: Normal heart sounds. No murmur heard. No friction rub. No gallop. Pulmonary:      Effort: Pulmonary effort is normal. No respiratory distress. Breath sounds: Normal breath sounds. No stridor. No wheezing or rhonchi. Abdominal:      General: Bowel sounds are normal. There is no distension. Palpations: Abdomen is soft. There is no mass. Tenderness: There is no abdominal tenderness. There is no guarding. Musculoskeletal:         General: Normal range of motion. Cervical back: Normal range of motion and neck supple. Right lower leg: No edema. Left lower leg: No edema. Skin:     General: Skin is warm. Findings: No rash. Neurological:      General: No focal deficit present. Mental Status: He is alert and oriented to person, place, and time. Cranial Nerves: No cranial nerve deficit. Sensory: No sensory deficit. Motor: No weakness.       Gait: Gait normal.   Psychiatric:         Mood and Affect: Mood normal.       Robert Ratliff MD

## 2023-07-24 NOTE — ASSESSMENT & PLAN NOTE
Lab Results   Component Value Date    EGFR 35 04/28/2023    EGFR 32 04/05/2023    EGFR 44 03/13/2023    CREATININE 1.80 (H) 04/28/2023    CREATININE 1.93 (H) 04/05/2023    CREATININE 1.51 (H) 03/13/2023   Avoid nephrotoxins  Follow-up with nephrology outpatient

## 2023-07-25 LAB
PSA FREE MFR SERPL: 32.9 %
PSA FREE SERPL-MCNC: 0.69 NG/ML
PSA SERPL-MCNC: 2.1 NG/ML (ref 0–4)

## 2023-09-27 DIAGNOSIS — R80.1 PERSISTENT PROTEINURIA: ICD-10-CM

## 2023-09-27 DIAGNOSIS — E55.9 VITAMIN D DEFICIENCY: ICD-10-CM

## 2023-09-27 DIAGNOSIS — N18.31 CKD STAGE G3A/A2, GFR 45-59 AND ALBUMIN CREATININE RATIO 30-299 MG/G (HCC): Primary | ICD-10-CM

## 2023-09-27 DIAGNOSIS — E78.5 HYPERLIPIDEMIA, UNSPECIFIED HYPERLIPIDEMIA TYPE: ICD-10-CM

## 2023-10-13 ENCOUNTER — TELEPHONE (OUTPATIENT)
Dept: PULMONOLOGY | Facility: CLINIC | Age: 78
End: 2023-10-13

## 2023-10-24 ENCOUNTER — TELEPHONE (OUTPATIENT)
Dept: NEPHROLOGY | Facility: CLINIC | Age: 78
End: 2023-10-24

## 2023-10-24 NOTE — TELEPHONE ENCOUNTER
Pt had a 10/3/23 appt that pt did not attend. Pt was rescheduled to 1/10/24 appt to see Dr. Landon Espinosa in [facility] that was moved up to 10/26/23.

## 2023-10-25 ENCOUNTER — OFFICE VISIT (OUTPATIENT)
Dept: FAMILY MEDICINE CLINIC | Facility: CLINIC | Age: 78
End: 2023-10-25

## 2023-10-25 ENCOUNTER — PATIENT OUTREACH (OUTPATIENT)
Dept: OTHER | Facility: OTHER | Age: 78
End: 2023-10-25

## 2023-10-25 VITALS
OXYGEN SATURATION: 99 % | TEMPERATURE: 98.5 F | DIASTOLIC BLOOD PRESSURE: 60 MMHG | WEIGHT: 183 LBS | HEIGHT: 68 IN | SYSTOLIC BLOOD PRESSURE: 160 MMHG | RESPIRATION RATE: 16 BRPM | BODY MASS INDEX: 27.74 KG/M2 | HEART RATE: 105 BPM

## 2023-10-25 DIAGNOSIS — J41.8 MIXED SIMPLE AND MUCOPURULENT CHRONIC BRONCHITIS (HCC): ICD-10-CM

## 2023-10-25 DIAGNOSIS — H91.13 PRESBYCUSIS OF BOTH EARS: ICD-10-CM

## 2023-10-25 DIAGNOSIS — R80.1 PERSISTENT PROTEINURIA: ICD-10-CM

## 2023-10-25 DIAGNOSIS — Z23 ENCOUNTER FOR IMMUNIZATION: ICD-10-CM

## 2023-10-25 DIAGNOSIS — Z12.2 SCREENING FOR LUNG CANCER: ICD-10-CM

## 2023-10-25 DIAGNOSIS — I10 ESSENTIAL HYPERTENSION: Primary | ICD-10-CM

## 2023-10-25 DIAGNOSIS — N18.31 CKD STAGE G3A/A2, GFR 45-59 AND ALBUMIN CREATININE RATIO 30-299 MG/G (HCC): ICD-10-CM

## 2023-10-25 DIAGNOSIS — R60.9 PERIPHERAL EDEMA: ICD-10-CM

## 2023-10-25 PROCEDURE — 90662 IIV NO PRSV INCREASED AG IM: CPT | Performed by: FAMILY MEDICINE

## 2023-10-25 PROCEDURE — G0439 PPPS, SUBSEQ VISIT: HCPCS | Performed by: FAMILY MEDICINE

## 2023-10-25 PROCEDURE — 99214 OFFICE O/P EST MOD 30 MIN: CPT | Performed by: FAMILY MEDICINE

## 2023-10-25 PROCEDURE — G0008 ADMIN INFLUENZA VIRUS VAC: HCPCS | Performed by: FAMILY MEDICINE

## 2023-10-25 RX ORDER — FUROSEMIDE 20 MG/1
20 TABLET ORAL DAILY
Qty: 90 TABLET | Refills: 3 | Status: SHIPPED | OUTPATIENT
Start: 2023-10-25

## 2023-10-25 NOTE — PROGRESS NOTES
Assessment and Plan:     Problem List Items Addressed This Visit          Respiratory    COPD (chronic obstructive pulmonary disease) ([address])     Stable  Continue current medication  Up-to-date on influenza and pneumococcal vaccine            Cardiovascular and Mediastinum    Essential hypertension - Primary     Elevated blood pressure today since he ran out of his Lasix  Blood pressure goal less than 140/90  Continue Lasix and lisinopril  DASH diet discussed with patient         Relevant Medications    furosemide (LASIX) 20 mg tablet       Nervous and Auditory    Presbycusis of both ears     Continue wearing hearing aid            Genitourinary    CKD stage G3a/A2, GFR 45-59 and albumin creatinine ratio  mg/g     Lab Results   Component Value Date    EGFR 35 04/28/2023    EGFR 32 04/05/2023    EGFR 44 03/13/2023    CREATININE 1.80 (H) 04/28/2023    CREATININE 1.93 (H) 04/05/2023    CREATININE 1.51 (H) 03/13/2023   Creatinine stable  Avoid nephrotoxic agents  Follow-up outpatient with nephrology         Relevant Medications    furosemide (LASIX) 20 mg tablet       Other    Persistent proteinuria    Relevant Medications    furosemide (LASIX) 20 mg tablet    Peripheral edema     Stable  Patient had echo with normal ejection fraction of 65%  Continue Lasix  Recommend low-sodium diet         Screening for lung cancer    Relevant Orders    CT lung screening program     Other Visit Diagnoses       Encounter for immunization        Relevant Orders    influenza vaccine, high-dose, PF 0.7 mL (FLUZONE HIGH-DOSE) (Completed)             Preventive health issues were discussed with patient, and age appropriate screening tests were ordered as noted in patient's After Visit Summary. Personalized health advice and appropriate referrals for health education or preventive services given if needed, as noted in patient's After Visit Summary.      History of Present Illness:     Patient presents for a [employer] Visit    45-year-old male with a history of COPD, hypertension, and CKD who presents today for follow-up for annual wellness visit. He has no acute concerns today. He is at his baseline state of health. Follows with nephrology outpatient. He needs some medication refills today for his Lasix. He reports this helps keeps the swelling in his legs down. Patient Care Team:  Sapna Ledbetter MD as PCP - General (Family Medicine)  JACINDA MOHR MD (Nephrology)  Ej Perdomo RN as [facility]  Elena Egan RN as [facility]     Review of Systems:     Review of Systems   Constitutional:  Negative for appetite change, chills, diaphoresis, fatigue and fever. Eyes:  Negative for visual disturbance. Respiratory:  Negative for cough, shortness of breath and wheezing. Cardiovascular:  Negative for chest pain, palpitations and leg swelling. Gastrointestinal:  Negative for abdominal pain, blood in stool, constipation, diarrhea, nausea and vomiting. Genitourinary:  Negative for difficulty urinating, dysuria, frequency, hematuria and urgency. Musculoskeletal:  Negative for arthralgias. Skin:  Negative for rash. Neurological:  Negative for dizziness, tremors, weakness, light-headedness, numbness and headaches. Psychiatric/Behavioral:  Negative for confusion.          Problem List:     Patient Active Problem List   Diagnosis    Presbycusis of both ears    Essential hypertension    COPD (chronic obstructive pulmonary disease) (HCC)    Hyperlipidemia    CKD stage G3a/A2, GFR 45-59 and albumin creatinine ratio  mg/g    Persistent proteinuria    Peripheral edema    Encounter for screening colonoscopy    Screening for lung cancer    History of tobacco abuse      Past Medical and Surgical History:     Past Medical History:   Diagnosis Date    Asthma     CKD (chronic kidney disease)     Cyst of kidney, acquired 5/27/2020    Hypertension     Microalbuminuria     Osteopenia 3/1/2018    Tachycardia 2019    Vitamin D deficiency 2020     Past Surgical History:   Procedure Laterality Date    APPENDECTOMY      BEDSIDE SPIROMETRY WITH BRONCHODILATOR PRE/POST  2021    HERNIA REPAIR      STRABISMUS SURGERY        Family History:     Family History   Problem Relation Age of Onset    No Known Problems Mother     No Known Problems Father       Social History:     Social History     Socioeconomic History    Marital status: Single     Spouse name: None    Number of children: None    Years of education: None    Highest education level: None   Occupational History    None   Tobacco Use    Smoking status: Former     Packs/day: 0.50     Years: 53.00     Total pack years: 26.50     Types: Cigarettes     Start date:      Quit date:      Years since quittin.8     Passive exposure: Past    Smokeless tobacco: Never   Vaping Use    Vaping Use: Never used   Substance and Sexual Activity    Alcohol use: Yes     Comment: Rarely; once a year    Drug use: Never    Sexual activity: None   Other Topics Concern    None   Social History Narrative    None     Social Determinants of Health     Financial Resource Strain: Low Risk  (2022)    Overall Financial Resource Strain (CARDIA)     Difficulty of Paying Living Expenses: Not hard at all   Food Insecurity: No Food Insecurity (2022)    Hunger Vital Sign     Worried About Running Out of Food in the Last Year: Never true     Ran Out of Food in the Last Year: Never true   Transportation Needs: No Transportation Needs (2022)    PRAPARE - Transportation     Lack of Transportation (Medical): No     Lack of Transportation (Non-Medical):  No   Physical Activity: Not on file   Stress: Not on file   Social Connections: Not on file   Intimate Partner Violence: Not on file   Housing Stability: Not on file      Medications and Allergies:     Current Outpatient Medications   Medication Sig Dispense Refill    furosemide (LASIX) 20 mg tablet Take 1 tablet (20 mg total) by mouth daily 90 tablet 3    albuterol (PROVENTIL HFA,VENTOLIN HFA) 90 mcg/act inhaler Inhale 2 puffs every 6 (six) hours as needed for wheezing 18 g 12    Cholecalciferol (Vitamin D) 50 MCG (2000 UT) CAPS Take by mouth daily      glycopyrrolate-formoterol (BEVESPI AEROSPHERE) 9-4.8 MCG/ACT inhaler Inhale 2 puffs 2 (two) times a day 10.7 g 12    lisinopril (ZESTRIL) 40 mg tablet Take 1 tablet (40 mg total) by mouth every evening 90 tablet 3     No current facility-administered medications for this visit. No Known Allergies   Immunizations:     Immunization History   Administered Date(s) Administered    COVID-19 MODERNA VACC 0.5 ML IM 04/20/2021, 05/19/2021    COVID-19 PFIZER VACCINE 0.3 ML IM 04/05/2022    COVID-19 Pfizer Vac BIVALENT Marlon-sucrose 12 Yr+ IM 01/23/2023    INFLUENZA 09/14/2022    Influenza, high dose seasonal 0.7 mL 09/11/2018, 12/18/2019, 10/13/2021, 09/14/2022, 10/25/2023    Pneumococcal Conjugate 13-Valent 02/15/2018, 09/11/2018    Pneumococcal Polysaccharide PPV23 12/18/2019    Tdap 02/23/2010, 12/21/2016      Health Maintenance:         Topic Date Due    Lung Cancer Screening  09/01/2023    Hepatitis C Screening  Completed    Colorectal Cancer Screening  Discontinued         Topic Date Due    COVID-19 Vaccine (5 - Moderna series) 05/23/2023      Medicare Screening Tests and Risk Assessments:     Rola Wang is here for his Subsequent Wellness visit. Health Risk Assessment:   Patient rates overall health as good. Patient feels that their physical health rating is same. Patient is satisfied with their life. Eyesight was rated as same. Hearing was rated as same. Patient feels that their emotional and mental health rating is same. Patients states they are never, rarely angry. Patient states they are never, rarely unusually tired/fatigued. Pain experienced in the last 7 days has been none. Patient states that he has experienced no weight loss or gain in last 6 months. Fall Risk Screening: In the past year, patient has experienced: no history of falling in past year      Home Safety:  Patient does not have trouble with stairs inside or outside of their home. Patient has working smoke alarms and has working carbon monoxide detector. Home safety hazards include: none. Nutrition:   Current diet is Low Carb, Regular and Low Cholesterol. Medications:   Patient is not currently taking any over-the-counter supplements. Patient is able to manage medications. Activities of Daily Living (ADLs)/Instrumental Activities of Daily Living (IADLs):   Walk and transfer into and out of bed and chair?: Yes  Dress and groom yourself?: Yes    Bathe or shower yourself?: Yes    Feed yourself? Yes  Do your laundry/housekeeping?: Yes  Manage your money, pay your bills and track your expenses?: Yes  Make your own meals?: Yes    Do your own shopping?: Yes    Previous Hospitalizations:   Any hospitalizations or ED visits within the last 12 months?: No      Advance Care Planning:     ACP document given: Yes    End of Life Decisions reviewed with patient: Yes    Provider agrees with end of life decisions: Yes      Comments: Wants all life resuscitative measures- > CPR and intubation    Cognitive Screening:   Provider or family/friend/caregiver concerned regarding cognition?: No    PREVENTIVE SCREENINGS      Cardiovascular Screening:    General: Screening Not Indicated and History Lipid Disorder      Diabetes Screening:     General: Screening Current      Prostate Cancer Screening:    General: Screening Not Indicated      Abdominal Aortic Aneurysm (AAA) Screening:    Risk factors include: tobacco use        Hepatitis C Screening:    General: Screening Current    Screening, Brief Intervention, and Referral to Treatment (SBIRT)    Screening  Typical number of drinks in a day: 0  Typical number of drinks in a week: 0  Interpretation: Low risk drinking behavior.     Other Counseling Topics:   Skin self-exam, sunscreen and regular weightbearing exercise. No results found. Physical Exam:     /60 (BP Location: Right arm, Patient Position: Sitting, Cuff Size: Standard)   Pulse 105   Temp 98.5 °F (36.9 °C) (Temporal)   Resp 16   Ht 5' 8" (1.727 m)   Wt 83 kg (183 lb)   SpO2 99%   BMI 27.83 kg/m²     Physical Exam  Vitals reviewed. Constitutional:       General: He is not in acute distress. Appearance: Normal appearance. He is well-developed. He is not ill-appearing, toxic-appearing or diaphoretic. HENT:      Head: Normocephalic and atraumatic. Right Ear: Tympanic membrane, ear canal and external ear normal. There is no impacted cerumen. Left Ear: Tympanic membrane, ear canal and external ear normal. There is no impacted cerumen. Nose: Nose normal.      Mouth/Throat:      Mouth: Mucous membranes are moist.      Pharynx: No oropharyngeal exudate or posterior oropharyngeal erythema. Eyes:      General: No scleral icterus. Right eye: No discharge. Left eye: No discharge. Extraocular Movements: Extraocular movements intact. Conjunctiva/sclera: Conjunctivae normal.      Pupils: Pupils are equal, round, and reactive to light. Cardiovascular:      Rate and Rhythm: Normal rate and regular rhythm. Heart sounds: Normal heart sounds. No murmur heard. No friction rub. No gallop. Pulmonary:      Effort: Pulmonary effort is normal. No respiratory distress. Breath sounds: Normal breath sounds. No wheezing. Abdominal:      General: Bowel sounds are normal. There is no distension. Palpations: Abdomen is soft. There is no mass. Tenderness: There is no abdominal tenderness. There is no guarding. Musculoskeletal:         General: Normal range of motion. Cervical back: Normal range of motion. Right lower leg: No edema. Left lower leg: No edema. Skin:     General: Skin is warm.       Capillary Refill: Capillary refill takes less than 2 seconds. Findings: No rash. Neurological:      General: No focal deficit present. Mental Status: He is alert and oriented to person, place, and time. Cranial Nerves: No cranial nerve deficit. Motor: No weakness.       Gait: Gait normal.   Psychiatric:         Mood and Affect: Mood normal.         Behavior: Behavior normal.          Isabel Gao MD

## 2023-10-25 NOTE — ASSESSMENT & PLAN NOTE
Elevated blood pressure today since he ran out of his Lasix  Blood pressure goal less than 140/90  Continue Lasix and lisinopril  DASH diet discussed with patient

## 2023-10-25 NOTE — ASSESSMENT & PLAN NOTE
Stable  Patient had echo with normal ejection fraction of 65%  Continue Lasix  Recommend low-sodium diet

## 2023-10-25 NOTE — PATIENT INSTRUCTIONS
Medicare Preventive Visit Patient Instructions  Thank you for completing your Welcome to Medicare Visit or Medicare Annual Wellness Visit today. Your next wellness visit will be due in one year (10/25/2024). The screening/preventive services that you may require over the next 5-10 years are detailed below. Some tests may not apply to you based off risk factors and/or age. Screening tests ordered at today's visit but not completed yet may show as past due. Also, please note that scanned in results may not display below. Preventive Screenings:  Service Recommendations Previous Testing/Comments   Colorectal Cancer Screening  Colonoscopy    Fecal Occult Blood Test (FOBT)/Fecal Immunochemical Test (FIT)  Fecal DNA/Cologuard Test  Flexible Sigmoidoscopy Age: 43-73 years old   Colonoscopy: every 10 years (May be performed more frequently if at higher risk)  OR  FOBT/FIT: every 1 year  OR  Cologuard: every 3 years  OR  Sigmoidoscopy: every 5 years  Screening may be recommended earlier than age 39 if at higher risk for colorectal cancer. Also, an individualized decision between you and your healthcare provider will decide whether screening between the ages of 77-80 would be appropriate.  Colonoscopy: 04/27/2022  FOBT/FIT: Not on file  Cologuard: Not on file  Sigmoidoscopy: Not on file          Prostate Cancer Screening Individualized decision between patient and health care provider in men between ages of 53-66   Medicare will cover every 12 months beginning on the day after your 50th birthday PSA: 2.1 ng/mL     Screening Not Indicated     Hepatitis C Screening Once for adults born between 1945 and 1965  More frequently in patients at high risk for Hepatitis C Hep C Antibody: 01/19/2022    Screening Current   Diabetes Screening 1-2 times per year if you're at risk for diabetes or have pre-diabetes Fasting glucose: 102 mg/dL (4/5/2023)  A1C: 5.9 % (3/4/2019)  Screening Current   Cholesterol Screening Once every 5 years if you don't have a lipid disorder. May order more often based on risk factors. Lipid panel: 06/21/2022  Screening Not Indicated  History Lipid Disorder      Other Preventive Screenings Covered by Medicare:  Abdominal Aortic Aneurysm (AAA) Screening: covered once if your at risk. You're considered to be at risk if you have a family history of AAA or a male between the age of 70-76 who smoking at least 100 cigarettes in your lifetime. Lung Cancer Screening: covers low dose CT scan once per year if you meet all of the following conditions: (1) Age 48-67; (2) No signs or symptoms of lung cancer; (3) Current smoker or have quit smoking within the last 15 years; (4) You have a tobacco smoking history of at least 20 pack years (packs per day x number of years you smoked); (5) You get a written order from a healthcare provider. Glaucoma Screening: covered annually if you're considered high risk: (1) You have diabetes OR (2) Family history of glaucoma OR (3)  aged 48 and older OR (3)  American aged 72 and older  Osteoporosis Screening: covered every 2 years if you meet one of the following conditions: (1) Have a vertebral abnormality; (2) On glucocorticoid therapy for more than 3 months; (3) Have primary hyperparathyroidism; (4) On osteoporosis medications and need to assess response to drug therapy. HIV Screening: covered annually if you're between the age of 14-79. Also covered annually if you are younger than 13 and older than 72 with risk factors for HIV infection. For pregnant patients, it is covered up to 3 times per pregnancy.     Immunizations:  Immunization Recommendations   Influenza Vaccine Annual influenza vaccination during flu season is recommended for all persons aged >= 6 months who do not have contraindications   Pneumococcal Vaccine   * Pneumococcal conjugate vaccine = PCV13 (Prevnar 13), PCV15 (Vaxneuvance), PCV20 (Prevnar 20)  * Pneumococcal polysaccharide vaccine = PPSV23 (Pneumovax) Adults 44-48 yo with certain risk factors or if 69+ yo  If never received any pneumonia vaccine: recommend Prevnar 20 (PCV20)  Give PCV20 if previously received 1 dose of PCV13 or PPSV23   Hepatitis B Vaccine 3 dose series if at intermediate or high risk (ex: diabetes, end stage renal disease, liver disease)   Respiratory syncytial virus (RSV) Vaccine - COVERED BY MEDICARE PART D  * RSVPreF3 (Arexvy) CDC recommends that adults 61years of age and older may receive a single dose of RSV vaccine using shared clinical decision-making (SCDM)   Tetanus (Td) Vaccine - COST NOT COVERED BY MEDICARE PART B Following completion of primary series, a booster dose should be given every 10 years to maintain immunity against tetanus. Td may also be given as tetanus wound prophylaxis. Tdap Vaccine - COST NOT COVERED BY MEDICARE PART B Recommended at least once for all adults. For pregnant patients, recommended with each pregnancy. Shingles Vaccine (Shingrix) - COST NOT COVERED BY MEDICARE PART B  2 shot series recommended in those 19 years and older who have or will have weakened immune systems or those 50 years and older     Health Maintenance Due:      Topic Date Due   • Lung Cancer Screening  09/01/2023   • Hepatitis C Screening  Completed   • Colorectal Cancer Screening  Discontinued     Immunizations Due:      Topic Date Due   • COVID-19 Vaccine (5 - Moderna series) 05/23/2023   • Influenza Vaccine (1) 09/01/2023     Advance Directives   What are advance directives? Advance directives are legal documents that state your wishes and plans for medical care. These plans are made ahead of time in case you lose your ability to make decisions for yourself. Advance directives can apply to any medical decision, such as the treatments you want, and if you want to donate organs. What are the types of advance directives? There are many types of advance directives, and each state has rules about how to use them.  You may choose a combination of any of the following:  Living will: This is a written record of the treatment you want. You can also choose which treatments you do not want, which to limit, and which to stop at a certain time. This includes surgery, medicine, IV fluid, and tube feedings. Durable power of  for healthcare [facility]): This is a written record that states who you want to make healthcare choices for you when you are unable to make them for yourself. This person, called a proxy, is usually a family member or a friend. You may choose more than 1 proxy. Do not resuscitate (DNR) order:  A DNR order is used in case your heart stops beating or you stop breathing. It is a request not to have certain forms of treatment, such as CPR. A DNR order may be included in other types of advance directives. Medical directive: This covers the care that you want if you are in a coma, near death, or unable to make decisions for yourself. You can list the treatments you want for each condition. Treatment may include pain medicine, surgery, blood transfusions, dialysis, IV or tube feedings, and a ventilator (breathing machine). Values history: This document has questions about your views, beliefs, and how you feel and think about life. This information can help others choose the care that you would choose. Why are advance directives important? An advance directive helps you control your care. Although spoken wishes may be used, it is better to have your wishes written down. Spoken wishes can be misunderstood, or not followed. Treatments may be given even if you do not want them. An advance directive may make it easier for your family to make difficult choices about your care. Weight Management   Why it is important to manage your weight:  Being overweight increases your risk of health conditions such as heart disease, high blood pressure, type 2 diabetes, and certain types of cancer.  It can also increase your risk for osteoarthritis, sleep apnea, and other respiratory problems. Aim for a slow, steady weight loss. Even a small amount of weight loss can lower your risk of health problems. How to lose weight safely:  A safe and healthy way to lose weight is to eat fewer calories and get regular exercise. You can lose up about 1 pound a week by decreasing the number of calories you eat by 500 calories each day. Healthy meal plan for weight management:  A healthy meal plan includes a variety of foods, contains fewer calories, and helps you stay healthy. A healthy meal plan includes the following:  Eat whole-grain foods more often. A healthy meal plan should contain fiber. Fiber is the part of grains, fruits, and vegetables that is not broken down by your body. Whole-grain foods are healthy and provide extra fiber in your diet. Some examples of whole-grain foods are whole-wheat breads and pastas, oatmeal, brown rice, and bulgur. Eat a variety of vegetables every day. Include dark, leafy greens such as spinach, kale, chino greens, and mustard greens. Eat yellow and orange vegetables such as carrots, sweet potatoes, and winter squash. Eat a variety of fruits every day. Choose fresh or canned fruit (canned in its own juice or light syrup) instead of juice. Fruit juice has very little or no fiber. Eat low-fat dairy foods. Drink fat-free (skim) milk or 1% milk. Eat fat-free yogurt and low-fat cottage cheese. Try low-fat cheeses such as mozzarella and other reduced-fat cheeses. Choose meat and other protein foods that are low in fat. Choose beans or other legumes such as split peas or lentils. Choose fish, skinless poultry (chicken or turkey), or lean cuts of red meat (beef or pork). Before you cook meat or poultry, cut off any visible fat. Use less fat and oil. Try baking foods instead of frying them. Add less fat, such as margarine, sour cream, regular salad dressing and mayonnaise to foods. Eat fewer high-fat foods. Some examples of high-fat foods include french fries, doughnuts, ice cream, and cakes. Eat fewer sweets. Limit foods and drinks that are high in sugar. This includes candy, cookies, regular soda, and sweetened drinks. Exercise:  Exercise at least 30 minutes per day on most days of the week. Some examples of exercise include walking, biking, dancing, and swimming. You can also fit in more physical activity by taking the stairs instead of the elevator or parking farther away from stores. Ask your healthcare provider about the best exercise plan for you. © Copyright [employer] 2018 Information is for End User's use only and may not be sold, redistributed or otherwise used for commercial purposes.  All illustrations and images included in CareNotes® are the copyrighted property of [employer] Inc. or [address] [place] [address]

## 2023-10-25 NOTE — PROGRESS NOTES
Wednesday October 25, 2023    Mr. Dagoberto Agustin is a 68 yo M PMH HTN, chronic bronchitis, presbycusis, CKD, and nicotine use who presents for a [place] nursing visit at [facility] during outreach hours. He saw his PCP today and is here with his after visit summary to go through it. This [employer] reviewed AVS and noted CT lung screening ordered. He expressed understanding and will call to make an appointment for his CT lung screening. He was informed about his nephrology appointment that will be tomorrow (10/26) at 1300. He takes lasix 20 mg and follows with Nephrology for his chronic kidney disease. Wears hearing aid and brought his  along with him today to show the [facility]. Client pleasant in this encounter. Emotional support given.

## 2023-10-25 NOTE — ASSESSMENT & PLAN NOTE
Lab Results   Component Value Date    EGFR 35 04/28/2023    EGFR 32 04/05/2023    EGFR 44 03/13/2023    CREATININE 1.80 (H) 04/28/2023    CREATININE 1.93 (H) 04/05/2023    CREATININE 1.51 (H) 03/13/2023   Creatinine stable  Avoid nephrotoxic agents  Follow-up outpatient with nephrology

## 2023-10-26 ENCOUNTER — OFFICE VISIT (OUTPATIENT)
Dept: NEPHROLOGY | Facility: CLINIC | Age: 78
End: 2023-10-26
Payer: MEDICARE

## 2023-10-26 VITALS
HEIGHT: 68 IN | WEIGHT: 185 LBS | DIASTOLIC BLOOD PRESSURE: 58 MMHG | BODY MASS INDEX: 28.04 KG/M2 | SYSTOLIC BLOOD PRESSURE: 122 MMHG

## 2023-10-26 DIAGNOSIS — N17.9 AKI (ACUTE KIDNEY INJURY) (HCC): Primary | ICD-10-CM

## 2023-10-26 DIAGNOSIS — E55.9 VITAMIN D DEFICIENCY: ICD-10-CM

## 2023-10-26 DIAGNOSIS — N18.31 CKD STAGE G3A/A2, GFR 45-59 AND ALBUMIN CREATININE RATIO 30-299 MG/G (HCC): ICD-10-CM

## 2023-10-26 DIAGNOSIS — R80.1 PERSISTENT PROTEINURIA: ICD-10-CM

## 2023-10-26 DIAGNOSIS — I10 ESSENTIAL HYPERTENSION: ICD-10-CM

## 2023-10-26 PROCEDURE — 99214 OFFICE O/P EST MOD 30 MIN: CPT | Performed by: INTERNAL MEDICINE

## 2023-10-26 NOTE — PROGRESS NOTES
Nephrology Follow up Consultation  Irma Larkin 69 y.o. male MRN: [id number]            BACKGROUND:  Irma Larkin is a 69 y.o.male who was referred by Thad Arreola MD for evaluation of Follow-up and Chronic Kidney Disease  . ASSESSMENT / PLAN:   69 y.o.  male with pmh of multiple co-morbidities including  hypertension  (x 5yrs), COPD, hyperlipidemia and CKD stage 2/3 presents to the office for routine follow-up. 1.  Acute kidney injury on CKD stage 3AA2 versus CKD progression:  - Patient has a baseline creatinine of 1.2-1.4 mg/dL. Most recent labs show a Creatinine of 1.80 mg/dL on on 4/28/2023. Renal function r elevated in April and no subsequent lab draw. We will get blood work after the visit  - likely has underlying CKD secondary to age-related nephron loss plus hypertensive nephrosclerosis plus episode of acute kidney injury nondialysis requiring.    - renal ultrasound from 09/11/2019 showed right kidney 10 cm, 2 x 2 cm cyst in the right kidney and a septated cyst in the left kidney. Left kidney size not documented, no hydronephrosis. -renal ultrasound from 08/12/2020 showed bilateral medical renal disease, bilateral renal cysts, left kidney cyst partially septated may repeat ultrasound in 1 year. -  renal ultrasound from 06/24/2021 showing bilateral kidneys approximately 10 cm, stable right renal cysts, prostatomegaly. - Proteinuria -  protein creatinine ratio of 90 mg as of October 2022 stable improved. - Acid base and lytes stable   - appears to be minimally hypervolemic continue Lasix 20 mg p.o. q.48   - Recommend to avoid use of NSAIDs, nephrotoxins. Caution advised with regards to exposure to IV contrast dye.   - Discussed with the patient in depth his renal status, including the possible etiologies for CKD.    - Advised the patient that when his GFR is close to 20mL/min then will start discussing about RRT(renal replacement therapy) options such as renal transplant, peritoneal dialysis and hemodialysis. - Informed the patient about the various options for Renal Replacement therapy. - Discussed with the patient how we need to work together to delay the progression of CKD with optimal BP control based on their age and co-morbidities and trying to reduce proteinuria by the use of anti-proteinuric agents. 2. Hypertension:  - Patient is on lisinopril 40 mg po QHS, lasix 20mg po Q48   - Goal BP of <  130/80 based on age and comorbidities  - Instructed to follow low sodium (2gm)diet.  - Advised to hold ACEI/ARBs if patient suffers from dehydration due to gastrointestinal losses due to risk of LILLY secondary to failure to autoregulate. 3. Hemoglobin:  - Goal Hb of 10-12 g/dL  - Most recent labs suggestive of  12.4 g per dL. - no role for IV iron at this time  -Check CBC after the visit     4. CKD-MBD(Mineral Bone Disease)/vitamin-D deficiency:  - Based on patients CKD stage following is the goal of therapy. - Maintain calcium phosphorus product of < 55.  - Stage 3 CKD - Goal Ca 8.5-10 mg/dL , goal Phos 2.7-4.6 mg/dL  , goal iPTH 30-70 pg/mL  - Patient is currently at goal.  -  continue vitamin-D 2000 units p.o. Q.day.  - most recent vitamin-D level of  40.6 and intact PTH of 52.9 as of April 2023  - check intact PTH and vitamin-D, prior to next visit and after today's visit     5. Lipids:  - goal LDL less than 70  - Management as per PCP     6. Hearing loss:  - management as per Primary team     7. COPD:  - Management as per primary team  - on inhalers  - follow-up with Pulmonary . 8. Nutrition:  - Encouraged patient to follow a renal diet comprising of moderate potassium, low phosphorus and protein restriction to 0.8gm/kg. - Will check serum albumin with next blood work. 9. Followup:  - Patient is to follow-up in 4 months, with lab work to be performed in after the visit and then again in a few days prior to the visit.   Advised patient to call me in 10 days to review the results if they do not hear back from me, as I may have not received the results. Gabby Us, 10/26/2023, 1:09 PM             SUBJECTIVE: 69 y.o. male presents to the office for routine follow-up after being lost to follow-up for about a year. Unfortunately did not get lab work prior to the visit agreeable to getting blood work after the visit. Has been taking his Lasix every other day has no complaints will be going to get blood work after the visit today no NSAID use thankful for the care information that he is got today. No recent hospitalizations. Reports since last visit he is no longer on the Norvasc as he was taken off of it by his PCP since his blood pressures were running good    Review of Systems   Constitutional:  Negative for chills and fever. HENT:  Negative for congestion and sore throat. Respiratory:  Negative for shortness of breath and wheezing. Cardiovascular:  Negative for leg swelling. Gastrointestinal:  Negative for diarrhea and vomiting. Genitourinary:  Negative for difficulty urinating, dysuria and hematuria. Musculoskeletal:  Negative for back pain. Neurological:  Negative for dizziness, light-headedness and headaches. Psychiatric/Behavioral:  Negative for agitation and confusion. All other systems reviewed and are negative.       PAST MEDICAL HISTORY:  Past Medical History:   Diagnosis Date   • Asthma    • CKD (chronic kidney disease)    • Cyst of kidney, acquired 5/27/2020   • Hypertension    • Microalbuminuria    • Osteopenia 3/1/2018   • Tachycardia 2/12/2019   • Vitamin D deficiency 9/2/2020       PROBLEM LIST    Patient Active Problem List   Diagnosis   • Presbycusis of both ears   • Essential hypertension   • COPD (chronic obstructive pulmonary disease) ([address])   • Hyperlipidemia   • CKD stage G3a/A2, GFR 45-59 and albumin creatinine ratio  mg/g   • Vitamin D deficiency   • LILLY (acute kidney injury) ([address])   • Persistent proteinuria   • Peripheral edema   • Encounter for screening colonoscopy   • Screening for lung cancer   • History of tobacco abuse       PAST SURGICAL HISTORY:  Past Surgical History:   Procedure Laterality Date   • APPENDECTOMY     • BEDSIDE SPIROMETRY WITH BRONCHODILATOR PRE/POST  4/21/2021   • HERNIA REPAIR     • STRABISMUS SURGERY         SOCIAL HISTORY :   reports that he quit smoking about 6 years ago. His smoking use included cigarettes. He started smoking about 59 years ago. He has a 26.50 pack-year smoking history. He has been exposed to tobacco smoke. He has never used smokeless tobacco. He reports current alcohol use. He reports that he does not use drugs. FAMILY HISTORY:  Family History   Problem Relation Age of Onset   • No Known Problems Mother    • No Known Problems Father        ALLERGIES:  No Known Allergies        PHYSICAL EXAM:  Vitals:    10/26/23 1246 10/26/23 1303   BP: 148/66 122/58   BP Location: Left arm    Patient Position: Sitting    Cuff Size: Adult    Weight: 83.9 kg (185 lb)    Height: 5' 8" (1.727 m)      Body mass index is 28.13 kg/m². Physical Exam  Vitals reviewed. Constitutional:       General: He is not in acute distress. Appearance: Normal appearance. He is normal weight. He is not ill-appearing, toxic-appearing or diaphoretic. HENT:      Head: Normocephalic and atraumatic. Mouth/Throat:      Pharynx: No oropharyngeal exudate. Eyes:      General: No scleral icterus. Cardiovascular:      Rate and Rhythm: Normal rate. Pulmonary:      Effort: No respiratory distress. Breath sounds: Normal breath sounds. No stridor. Abdominal:      General: There is no distension. Palpations: Abdomen is soft. There is no mass. Tenderness: There is no right CVA tenderness or left CVA tenderness. Musculoskeletal:         General: Swelling present. Cervical back: Normal range of motion. No rigidity.       Comments: Trace lower extremity edema bilaterally Skin:     Coloration: Skin is not jaundiced. Neurological:      General: No focal deficit present. Mental Status: He is alert and oriented to person, place, and time. Psychiatric:         Mood and Affect: Mood normal.         Behavior: Behavior normal.         LABORATORY DATA:           Invalid input(s): "ALBUMIN", "INTACTPTH", "IRONSAT"       rest all reviewed    RADIOLOGY:  No orders to display     Rest all reviewed        MEDICATIONS:    Current Outpatient Medications:   •  albuterol (PROVENTIL HFA,VENTOLIN HFA) 90 mcg/act inhaler, Inhale 2 puffs every 6 (six) hours as needed for wheezing, Disp: 18 g, Rfl: 12  •  Cholecalciferol (Vitamin D) 50 MCG (2000 UT) CAPS, Take by mouth daily, Disp: , Rfl:   •  furosemide (LASIX) 20 mg tablet, Take 1 tablet (20 mg total) by mouth daily (Patient taking differently: Take 20 mg by mouth every other day), Disp: 90 tablet, Rfl: 3  •  glycopyrrolate-formoterol (BEVESPI AEROSPHERE) 9-4.8 MCG/ACT inhaler, Inhale 2 puffs 2 (two) times a day, Disp: 10.7 g, Rfl: 12  •  lisinopril (ZESTRIL) 40 mg tablet, Take 1 tablet (40 mg total) by mouth every evening, Disp: 90 tablet, Rfl: 3          Portions of the record may have been created with voice recognition software. Occasional wrong word or "sound a like" substitutions may have occurred due to the inherent limitations of voice recognition software. Read the chart carefully and recognize, using context, where substitutions have occurred. If you have any questions, please contact the dictating provider.

## 2023-10-26 NOTE — PATIENT INSTRUCTIONS
- get blood work after the visit    - Please call me in 10 days after having your blood work done to review the results if you do not hear back from me or my office, as I may have not received the results. - please remember to perform blood work prior to the next visit. - Please call if the blood pressure top number is greater than 140 or less than 110 consistently. - Please call if you are gaining more than 2lbs in 2 days for adjustment of water pills.  ~ Please AVOID the following pain medications. LIST OF NSAIDS (NONSTEROIDAL ANTI-INFLAMMATORY DRUGS) AND CHRISTIANSON-2 INHIBITORS    DIFLUNISAL (DOLOBID)  IBUPROFEN (MOTRIN, ADVIL)  FLURBIPROFEN (ANSAID)  KETOPROFEN (ORUDIS, ORUVAIL)  FENOPROFEN (NALFON)  NABUMETONE (RELAFEN)  PIROXICAM (FELDENE)  NAPROXEN (ALEVE, NAPROSYN, NAPRELAN, ANAPROX)  DICLOFENAC (VOLTAREN, CATAFLAM)  INDOMETHACIN (INDOCIN)  SULINDAC (CLINORIL)  TOLMETIN (TOLETIN)  ETODOLAC (LODINE)  MELOXICAM (MOBIC)  KETOROLAC (TORADOL)  OXAPROZIN (DAYPRO)  CELECOXIB (CELEBREX)    Phosphorus diet  Follow a low phosphorus diet.     Avoid these higher phosphorus foods: Choose these lower phosphorus foods:   Milk, pudding or yogurt (from animals and from many soy varieties) Rice milk (unfortified), nondairy creamer (if it doesn't have terms in the ingredients list that contain the letters "phos")   Hard cheeses, ricotta or cottage cheese, fat-free cream cheese Regular and low-fat cream cheese   Ice cream or frozen yogurt Sherbet or frozen fruit pops   Soups made with higher phosphorus ingredients (milk, dried peas, beans, lentils) Soups made with lower phosphorus ingredients (broth- or water-based with other lower phosphorus ingredients)   Whole grains, including whole-grain breads, crackers, cereal, rice and pasta Refined grains, including white bread, crackers, cereals, rice and pasta   Quick breads, biscuits, cornbread, muffins, pancakes or waffles Homemade refined (white) dinner rolls, bagels or [place] muffins   Dried peas (split, black-eyed), beans (black, garbanzo, lima, kidney, navy, garcia) or lentils Green peas (canned, frozen), green beans or wax beans   Organ meats, walleye, pollock or sardines Lean beef, pork, lamb, poultry or other fish   Nuts and seeds Popcorn   Peanut butter and other nut butters Jam, jelly or honey   Chocolate, including chocolate drinks Carob (chocolate-flavored) candy, hard candy or gumdrops   Radha and pepper-type sodas, flavored mascorro, bottled teas (if a term in the ingredients list contains the letters "phos") Lemon-lime soda, ginger ale or root beer, plain water   Follow a moderate potassium diet. Things to do to reduce your blood pressure include working with all your physician to do the following:  ~ stop smoking if you smoke. ~ increase cardiovascular exercise like walking and swimming.   ~ modify your diet to decrease fat and salt intake. ~ reduce your weight if you are overweight or obese.  ~ increase the consumption of fruits, vegetables and whole grains. ~ decrease alcohol consumption if you consume alcohol.   ~ try to minimize stress in your life with lifestyle modifications. ~ be compliant with your anti-hypertensive medications. ~ adjust your medications to help improve your vascular stiffness and decrease risks for heart attacks and strokes.

## 2023-10-30 ENCOUNTER — LAB (OUTPATIENT)
Dept: LAB | Facility: HOSPITAL | Age: 78
End: 2023-10-30
Payer: MEDICARE

## 2023-10-30 DIAGNOSIS — R80.1 PERSISTENT PROTEINURIA: ICD-10-CM

## 2023-10-30 DIAGNOSIS — I10 ESSENTIAL HYPERTENSION: ICD-10-CM

## 2023-10-30 DIAGNOSIS — N17.9 AKI (ACUTE KIDNEY INJURY) (HCC): ICD-10-CM

## 2023-10-30 DIAGNOSIS — N18.31 CKD STAGE G3A/A2, GFR 45-59 AND ALBUMIN CREATININE RATIO 30-299 MG/G (HCC): ICD-10-CM

## 2023-10-30 DIAGNOSIS — E78.5 HYPERLIPIDEMIA, UNSPECIFIED HYPERLIPIDEMIA TYPE: ICD-10-CM

## 2023-10-30 DIAGNOSIS — E55.9 VITAMIN D DEFICIENCY: ICD-10-CM

## 2023-10-30 LAB
25(OH)D3 SERPL-MCNC: 69.2 NG/ML (ref 30–100)
ALBUMIN SERPL BCP-MCNC: 4.4 G/DL (ref 3.5–5)
ANION GAP SERPL CALCULATED.3IONS-SCNC: 8 MMOL/L
BUN SERPL-MCNC: 52 MG/DL (ref 5–25)
CALCIUM SERPL-MCNC: 9.9 MG/DL (ref 8.4–10.2)
CHLORIDE SERPL-SCNC: 103 MMOL/L (ref 96–108)
CO2 SERPL-SCNC: 27 MMOL/L (ref 21–32)
CREAT SERPL-MCNC: 1.69 MG/DL (ref 0.6–1.3)
GFR SERPL CREATININE-BSD FRML MDRD: 38 ML/MIN/1.73SQ M
GLUCOSE SERPL-MCNC: 100 MG/DL (ref 65–140)
PHOSPHATE SERPL-MCNC: 2.9 MG/DL (ref 2.3–4.1)
POTASSIUM SERPL-SCNC: 4.8 MMOL/L (ref 3.5–5.3)
PTH-INTACT SERPL-MCNC: 50.7 PG/ML (ref 12–88)
SODIUM SERPL-SCNC: 138 MMOL/L (ref 135–147)

## 2023-10-30 PROCEDURE — 80069 RENAL FUNCTION PANEL: CPT

## 2023-10-30 PROCEDURE — 82306 VITAMIN D 25 HYDROXY: CPT

## 2023-10-30 PROCEDURE — 36415 COLL VENOUS BLD VENIPUNCTURE: CPT

## 2023-10-30 PROCEDURE — 83970 ASSAY OF PARATHORMONE: CPT

## 2023-10-31 ENCOUNTER — TELEPHONE (OUTPATIENT)
Dept: NEPHROLOGY | Facility: CLINIC | Age: 78
End: 2023-10-31

## 2023-10-31 NOTE — TELEPHONE ENCOUNTER
----- Message from JACINDA MOHR MD sent at 10/31/2023  7:41 AM EDT -----  Please let the patient know that most recent lab work in terms of renal parameters are stable and improving since April work. Will discuss further at the upcoming visit, let me know if they have any questions or concerns.     Thanks

## 2023-10-31 NOTE — TELEPHONE ENCOUNTER
I spoke to [facility], He is aware that blood work is stable no changes to be made at this time.

## 2023-10-31 NOTE — RESULT ENCOUNTER NOTE
Please let the patient know that most recent lab work in terms of renal parameters are stable and improving since April work. Will discuss further at the upcoming visit, let me know if they have any questions or concerns.     Thanks

## 2023-11-03 ENCOUNTER — HOSPITAL ENCOUNTER (OUTPATIENT)
Dept: CT IMAGING | Facility: HOSPITAL | Age: 78
End: 2023-11-03
Attending: FAMILY MEDICINE
Payer: MEDICARE

## 2023-11-03 DIAGNOSIS — Z12.2 SCREENING FOR LUNG CANCER: ICD-10-CM

## 2023-11-03 PROCEDURE — 71271 CT THORAX LUNG CANCER SCR C-: CPT

## 2024-01-03 ENCOUNTER — TELEPHONE (OUTPATIENT)
Dept: FAMILY MEDICINE CLINIC | Facility: CLINIC | Age: 79
End: 2024-01-03

## 2024-01-03 DIAGNOSIS — I10 ESSENTIAL HYPERTENSION: ICD-10-CM

## 2024-01-03 DIAGNOSIS — J45.990 EXERCISE-INDUCED ASTHMA: ICD-10-CM

## 2024-01-03 RX ORDER — ALBUTEROL SULFATE 90 UG/1
2 AEROSOL, METERED RESPIRATORY (INHALATION) EVERY 6 HOURS PRN
Qty: 18 G | Refills: 12 | Status: SHIPPED | OUTPATIENT
Start: 2024-01-03

## 2024-01-03 RX ORDER — LISINOPRIL 40 MG/1
40 TABLET ORAL EVERY EVENING
Qty: 90 TABLET | Refills: 3 | Status: SHIPPED | OUTPATIENT
Start: 2024-01-03

## 2024-01-03 NOTE — TELEPHONE ENCOUNTER
Pt needs refill      lisinopril (ZESTRIL) 40 mg tablet     albuterol (PROVENTIL HFA,VENTOLIN HFA) 90 mcg/act inhaler

## 2024-01-15 ENCOUNTER — TELEPHONE (OUTPATIENT)
Dept: FAMILY MEDICINE CLINIC | Facility: CLINIC | Age: 79
End: 2024-01-15

## 2024-01-15 NOTE — TELEPHONE ENCOUNTER
01/15/24    first attempt to contact patient. no answer      LEFT DETAILED MESSAGE       IF PT CONTACTS OFFICE PLEASE ASSIST PT WITH MOVING ZAC TO ANOTHER ZAC SLOP IF PCP STILL HAVES AVAILABLE OR BRANDEN ZAC 01/25/24,    PCP NOT AVAILABLE AT 3.:40PM

## 2024-01-30 ENCOUNTER — OFFICE VISIT (OUTPATIENT)
Dept: FAMILY MEDICINE CLINIC | Facility: CLINIC | Age: 79
End: 2024-01-30

## 2024-01-30 VITALS
OXYGEN SATURATION: 96 % | RESPIRATION RATE: 18 BRPM | WEIGHT: 190.25 LBS | TEMPERATURE: 96.6 F | HEIGHT: 68 IN | SYSTOLIC BLOOD PRESSURE: 132 MMHG | DIASTOLIC BLOOD PRESSURE: 64 MMHG | HEART RATE: 82 BPM | BODY MASS INDEX: 28.83 KG/M2

## 2024-01-30 DIAGNOSIS — R80.1 PERSISTENT PROTEINURIA: ICD-10-CM

## 2024-01-30 DIAGNOSIS — E78.5 HYPERLIPIDEMIA, UNSPECIFIED HYPERLIPIDEMIA TYPE: ICD-10-CM

## 2024-01-30 DIAGNOSIS — J41.8 MIXED SIMPLE AND MUCOPURULENT CHRONIC BRONCHITIS (HCC): Primary | ICD-10-CM

## 2024-01-30 DIAGNOSIS — N18.31 CKD STAGE G3A/A2, GFR 45-59 AND ALBUMIN CREATININE RATIO 30-299 MG/G (HCC): ICD-10-CM

## 2024-01-30 DIAGNOSIS — I10 ESSENTIAL HYPERTENSION: ICD-10-CM

## 2024-01-30 DIAGNOSIS — Z23 ENCOUNTER FOR IMMUNIZATION: ICD-10-CM

## 2024-01-30 PROBLEM — N17.9 AKI (ACUTE KIDNEY INJURY) (HCC): Status: RESOLVED | Noted: 2021-04-20 | Resolved: 2024-01-30

## 2024-01-30 PROBLEM — K80.20 CALCULUS OF GALLBLADDER WITHOUT CHOLECYSTITIS WITHOUT OBSTRUCTION: Status: ACTIVE | Noted: 2024-01-30

## 2024-01-30 PROCEDURE — 90750 HZV VACC RECOMBINANT IM: CPT

## 2024-01-30 PROCEDURE — 90471 IMMUNIZATION ADMIN: CPT

## 2024-01-30 PROCEDURE — 99214 OFFICE O/P EST MOD 30 MIN: CPT | Performed by: FAMILY MEDICINE

## 2024-01-30 RX ORDER — ROSUVASTATIN CALCIUM 10 MG/1
10 TABLET, COATED ORAL DAILY
Qty: 30 TABLET | Refills: 5 | Status: SHIPPED | OUTPATIENT
Start: 2024-01-30

## 2024-01-30 RX ORDER — FUROSEMIDE 20 MG/1
20 TABLET ORAL EVERY OTHER DAY
Qty: 90 TABLET | Refills: 3 | Status: SHIPPED | OUTPATIENT
Start: 2024-01-30

## 2024-01-30 NOTE — ASSESSMENT & PLAN NOTE
Lab Results   Component Value Date    EGFR 38 10/30/2023    EGFR 35 04/28/2023    EGFR 32 04/05/2023    CREATININE 1.69 (H) 10/30/2023    CREATININE 1.80 (H) 04/28/2023    CREATININE 1.93 (H) 04/05/2023   Creatinine stable  Avoid nephrotoxic agents  Follow-up outpatient with nephrology

## 2024-01-30 NOTE — PROGRESS NOTES
Name: Edwin Valenzuela Jr.      : 1945      MRN: [id number]  Encounter Provider: Alonzo Dorado MD  Encounter Date: 2024   Encounter department: [facility]    Assessment & Plan     1. Mixed simple and mucopurulent chronic bronchitis (HCC)  Assessment & Plan:  Stable  Continue current medication  Up-to-date on influenza and pneumococcal vaccine      2. CKD stage G3a/A2, GFR 45-59 and albumin creatinine ratio  mg/g (HCC)  Assessment & Plan:  Lab Results   Component Value Date    EGFR 38 10/30/2023    EGFR 35 2023    EGFR 32 2023    CREATININE 1.69 (H) 10/30/2023    CREATININE 1.80 (H) 2023    CREATININE 1.93 (H) 2023   Creatinine stable  Avoid nephrotoxic agents  Follow-up outpatient with nephrology    Orders:  -     furosemide (LASIX) 20 mg tablet; Take 1 tablet (20 mg total) by mouth every other day    3. Essential hypertension  Assessment & Plan:  Well controlled  Continue lasix and lisinopril  High ASCVD score of 29.2  Will start statin     Orders:  -     furosemide (LASIX) 20 mg tablet; Take 1 tablet (20 mg total) by mouth every other day  -     rosuvastatin (CRESTOR) 10 MG tablet; Take 1 tablet (10 mg total) by mouth daily    4. Persistent proteinuria  -     furosemide (LASIX) 20 mg tablet; Take 1 tablet (20 mg total) by mouth every other day    5. CKD stage G3a/A2, GFR 45-59 and albumin creatinine ratio  mg/g  Assessment & Plan:  Lab Results   Component Value Date    EGFR 38 10/30/2023    EGFR 35 2023    EGFR 32 2023    CREATININE 1.69 (H) 10/30/2023    CREATININE 1.80 (H) 2023    CREATININE 1.93 (H) 2023   Creatinine stable  Avoid nephrotoxic agents  Follow-up outpatient with nephrology    Orders:  -     furosemide (LASIX) 20 mg tablet; Take 1 tablet (20 mg total) by mouth every other day    6. Encounter for immunization  -     Zoster Vaccine Recombinant IM    7. Hyperlipidemia,  unspecified hyperlipidemia type  -     Lipid panel; Future           Subjective      78-year-old male with a history of hypertension, CKD, and COPD who presents today for follow-up.  He has no concerns today.  He is at his baseline state of health.  He is taking his medications regularly.  He denies any recent hospitalization      Review of Systems   Constitutional:  Negative for appetite change, chills, diaphoresis, fatigue and fever.   Eyes:  Negative for visual disturbance.   Respiratory:  Negative for cough, shortness of breath and wheezing.    Cardiovascular:  Negative for chest pain, palpitations and leg swelling.   Gastrointestinal:  Negative for abdominal pain, blood in stool, constipation, diarrhea, nausea and vomiting.   Genitourinary:  Negative for difficulty urinating, dysuria, frequency, hematuria and urgency.   Musculoskeletal:  Negative for arthralgias.   Skin:  Negative for rash.   Neurological:  Negative for dizziness, tremors, weakness, light-headedness, numbness and headaches.   Psychiatric/Behavioral:  Negative for confusion.        Current Outpatient Medications on File Prior to Visit   Medication Sig    albuterol (PROVENTIL HFA,VENTOLIN HFA) 90 mcg/act inhaler Inhale 2 puffs every 6 (six) hours as needed for wheezing    Cholecalciferol (Vitamin D) 50 MCG (2000 UT) CAPS Take by mouth daily    glycopyrrolate-formoterol (BEVESPI AEROSPHERE) 9-4.8 MCG/ACT inhaler Inhale 2 puffs 2 (two) times a day    lisinopril (ZESTRIL) 40 mg tablet Take 1 tablet (40 mg total) by mouth every evening    [DISCONTINUED] furosemide (LASIX) 20 mg tablet Take 1 tablet (20 mg total) by mouth daily (Patient taking differently: Take 20 mg by mouth every other day)    [DISCONTINUED] ergocalciferol (ERGOCALCIFEROL) 1.25 MG (12431 UT) capsule Take 1 capsule (50,000 Units total) by mouth once a week (Patient not taking: No sig reported)       Objective     /64 (BP Location: Left arm, Patient Position: Sitting, Cuff Size:  "Standard)   Pulse 82   Temp (!) 96.6 °F (35.9 °C) (Temporal)   Resp 18   Ht 5' 8\" (1.727 m)   Wt 86.3 kg (190 lb 4 oz)   SpO2 96%   BMI 28.93 kg/m²     Physical Exam  Constitutional:       General: He is not in acute distress.     Appearance: Normal appearance. He is well-developed. He is not ill-appearing, toxic-appearing or diaphoretic.   HENT:      Head: Normocephalic and atraumatic.      Right Ear: External ear normal.      Left Ear: External ear normal.      Nose: Nose normal.      Mouth/Throat:      Pharynx: No oropharyngeal exudate or posterior oropharyngeal erythema.   Eyes:      General: No scleral icterus.        Right eye: No discharge.         Left eye: No discharge.      Extraocular Movements: Extraocular movements intact.      Conjunctiva/sclera: Conjunctivae normal.      Pupils: Pupils are equal, round, and reactive to light.   Cardiovascular:      Rate and Rhythm: Normal rate and regular rhythm.      Heart sounds: Normal heart sounds. No murmur heard.     No friction rub. No gallop.   Pulmonary:      Effort: Pulmonary effort is normal. No respiratory distress.      Breath sounds: Normal breath sounds. No stridor. No wheezing or rhonchi.   Abdominal:      General: Bowel sounds are normal. There is no distension.      Palpations: Abdomen is soft. There is no mass.      Tenderness: There is no abdominal tenderness. There is no guarding.   Musculoskeletal:         General: Normal range of motion.      Cervical back: Normal range of motion and neck supple.      Right lower leg: No edema.      Left lower leg: No edema.   Skin:     General: Skin is warm.      Findings: No rash.   Neurological:      General: No focal deficit present.      Mental Status: He is alert and oriented to person, place, and time.      Cranial Nerves: No cranial nerve deficit.      Sensory: No sensory deficit.      Motor: No weakness.      Gait: Gait normal.   Psychiatric:         Mood and Affect: Mood normal.       Alonzo " Mac Dorado MD

## 2024-02-21 ENCOUNTER — TELEPHONE (OUTPATIENT)
Dept: NEPHROLOGY | Facility: CLINIC | Age: 79
End: 2024-02-21

## 2024-02-28 ENCOUNTER — TELEPHONE (OUTPATIENT)
Dept: NEPHROLOGY | Facility: CLINIC | Age: 79
End: 2024-02-28

## 2024-02-28 ENCOUNTER — PATIENT OUTREACH (OUTPATIENT)
Dept: OTHER | Facility: OTHER | Age: 79
End: 2024-02-28

## 2024-02-28 NOTE — PROGRESS NOTES
Client requested to visit with this writer. Client wanted  to discuss some facts about the [employer] held in [place]. Client is familiar to this writer for more than 10 years.     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     Writer reviewed upcoming medical appointments.  Client pleasant and talkative in this encounter. Emotional support given.

## 2024-02-28 NOTE — TELEPHONE ENCOUNTER
Pt called in regards to his appt today at 2pm with Dr. Jara. He said he was going to be 30 minutes late. I advised pt he would have to be reschedule bc Dr. Jara has a full schedule for the rest of the day. Pt rescheduled to 3/6/24 in the AO.

## 2024-03-06 ENCOUNTER — OFFICE VISIT (OUTPATIENT)
Dept: NEPHROLOGY | Facility: CLINIC | Age: 79
End: 2024-03-06
Payer: MEDICARE

## 2024-03-06 VITALS
SYSTOLIC BLOOD PRESSURE: 124 MMHG | BODY MASS INDEX: 29.55 KG/M2 | WEIGHT: 195 LBS | DIASTOLIC BLOOD PRESSURE: 70 MMHG | HEIGHT: 68 IN | HEART RATE: 92 BPM

## 2024-03-06 DIAGNOSIS — R80.1 PERSISTENT PROTEINURIA: ICD-10-CM

## 2024-03-06 DIAGNOSIS — E78.5 HYPERLIPIDEMIA, UNSPECIFIED HYPERLIPIDEMIA TYPE: ICD-10-CM

## 2024-03-06 DIAGNOSIS — N18.32 STAGE 3B CHRONIC KIDNEY DISEASE (HCC): Primary | ICD-10-CM

## 2024-03-06 DIAGNOSIS — E55.9 VITAMIN D DEFICIENCY: ICD-10-CM

## 2024-03-06 DIAGNOSIS — I10 ESSENTIAL HYPERTENSION: ICD-10-CM

## 2024-03-06 DIAGNOSIS — N18.31 CKD STAGE G3A/A2, GFR 45-59 AND ALBUMIN CREATININE RATIO 30-299 MG/G (HCC): ICD-10-CM

## 2024-03-06 PROCEDURE — 99214 OFFICE O/P EST MOD 30 MIN: CPT | Performed by: INTERNAL MEDICINE

## 2024-03-06 NOTE — PATIENT INSTRUCTIONS
"- get blood work after the visit  - attend CKD education session    - Please call me in 10 days after having your blood work done to review the results if you do not hear back from me or my office, as I may have not received the results.  - please remember to perform blood work prior to the next visit.  - Please call if the blood pressure top number is greater than 140 or less than 110 consistently.  - Please call if you are gaining more than 2lbs in 2 days for adjustment of water pills.  ~ Please AVOID the following pain medications.  LIST OF NSAIDS (NONSTEROIDAL ANTI-INFLAMMATORY DRUGS) AND CHRISTIANSON-2 INHIBITORS    DIFLUNISAL (DOLOBID)  IBUPROFEN (MOTRIN, ADVIL)  FLURBIPROFEN (ANSAID)  KETOPROFEN (ORUDIS, ORUVAIL)  FENOPROFEN (NALFON)  NABUMETONE (RELAFEN)  PIROXICAM (FELDENE)  NAPROXEN (ALEVE, NAPROSYN, NAPRELAN, ANAPROX)  DICLOFENAC (VOLTAREN, CATAFLAM)  INDOMETHACIN (INDOCIN)  SULINDAC (CLINORIL)  TOLMETIN (TOLETIN)  ETODOLAC (LODINE)  MELOXICAM (MOBIC)  KETOROLAC (TORADOL)  OXAPROZIN (DAYPRO)  CELECOXIB (CELEBREX)    Phosphorus diet  Follow a low phosphorus diet.    Avoid these higher phosphorus foods: Choose these lower phosphorus foods:   Milk, pudding or yogurt (from animals and from many soy varieties) Rice milk (unfortified), nondairy creamer (if it doesn't have terms in the ingredients list that contain the letters \"phos\")   Hard cheeses, ricotta or cottage cheese, fat-free cream cheese Regular and low-fat cream cheese   Ice cream or frozen yogurt Sherbet or frozen fruit pops   Soups made with higher phosphorus ingredients (milk, dried peas, beans, lentils) Soups made with lower phosphorus ingredients (broth- or water-based with other lower phosphorus ingredients)   Whole grains, including whole-grain breads, crackers, cereal, rice and pasta Refined grains, including white bread, crackers, cereals, rice and pasta   Quick breads, biscuits, cornbread, muffins, pancakes or waffles Homemade refined (white) dinner " "rolls, bagels or [place] muffins   Dried peas (split, black-eyed), beans (black, garbanzo, lima, kidney, navy, garcia) or lentils Green peas (canned, frozen), green beans or wax beans   Organ meats, walleye, pollock or sardines Lean beef, pork, lamb, poultry or other fish   Nuts and seeds Popcorn   Peanut butter and other nut butters Jam, jelly or honey   Chocolate, including chocolate drinks Carob (chocolate-flavored) candy, hard candy or gumdrops   Radha and pepper-type sodas, flavored mascorro, bottled teas (if a term in the ingredients list contains the letters \"phos\") Lemon-lime soda, ginger ale or root beer, plain water   Follow a moderate potassium diet.          Things to do to reduce your blood pressure include working with all your physician to do the following:  ~ stop smoking if you smoke.  ~ increase cardiovascular exercise like walking and swimming.   ~ modify your diet to decrease fat and salt intake.  ~ reduce your weight if you are overweight or obese.  ~ increase the consumption of fruits, vegetables and whole grains.  ~ decrease alcohol consumption if you consume alcohol.   ~ try to minimize stress in your life with lifestyle modifications.   ~ be compliant with your anti-hypertensive medications.   ~ adjust your medications to help improve your vascular stiffness and decrease risks for heart attacks and strokes.   "

## 2024-03-06 NOTE — PROGRESS NOTES
Nephrology Follow up Consultation  Edwin Valenzuela Jr. 78 y.o. male MRN: [id number]            BACKGROUND:  Edwin Valenzuela Jr. is a 78 y.o.male who was referred by Alonzo Dorado MD for evaluation of Chronic Kidney Disease and Follow-up  .      ASSESSMENT / PLAN:   78 y.o.  male with pmh of multiple co-morbidities including  hypertension  (x 5yrs), COPD, hyperlipidemia and CKD stage 2/3 presents to the office for routine follow-up.      1.  CKD stage IIIb:  - Patient had a baseline creatinine of 1.2-1.4 mg/dL, however since December 2022 no baseline creatinine is around 1.5 to 1.9 mg/dL. Most recent labs show a Creatinine of 1.69 mg/dL on on 10/30/23. Renal function stable get blood work after the visit.  If creatinine continues to rise may consider decreasing Lasix dosage further  - likely has underlying CKD secondary to age-related nephron loss plus hypertensive nephrosclerosis plus episode of acute kidney injury nondialysis requiring.    - renal ultrasound from 09/11/2019 showed right kidney 10 cm, 2 x 2 cm cyst in the right kidney and a septated cyst in the left kidney.  Left kidney size not documented, no hydronephrosis.  -renal ultrasound from 08/12/2020 showed bilateral medical renal disease, bilateral renal cysts, left kidney cyst partially septated may repeat ultrasound in 1 year.  -  renal ultrasound from 06/24/2021 showing bilateral kidneys approximately 10 cm, stable right renal cysts, prostatomegaly.  - Proteinuria -  protein creatinine ratio of 90 mg as of October 2022 stable improved.  Check urine micral and creatinine ratio after the visit  - Acid base and lytes stable   - appears to be euvolemic continue Lasix 20 mg p.o. q.48.  If creatinine continues to rise may consider decreasing Lasix dosage further  - Recommend to avoid use of NSAIDs, nephrotoxins. Caution advised with regards to exposure to IV contrast dye.   - Discussed with the patient in depth his renal status, including  the possible etiologies for CKD.   - Advised the patient that when his GFR is close to 20mL/min then will start discussing about RRT(renal replacement therapy) options such as renal transplant, peritoneal dialysis and hemodialysis.   - Informed the patient about the various options for Renal Replacement therapy.  - Discussed with the patient how we need to work together to delay the progression of CKD with optimal BP control based on their age and co-morbidities and trying to reduce proteinuria by the use of anti-proteinuric agents.   -Referral to CKD education/kidney smart placed on 3/6/2024     2. Hypertension:  - Patient is on lisinopril 40 mg po QHS, lasix 20mg po Q48   - Goal BP of <  130/80 based on age and comorbidities  - Instructed to follow low sodium (2gm)diet.  - Advised to hold ACEI/ARBs if patient suffers from dehydration due to gastrointestinal losses due to risk of LILLY secondary to failure to autoregulate.     3. Hemoglobin:  - Goal Hb of 10-12 g/dL  - Most recent labs suggestive of  12.4 g per dL.   - no role for IV iron at this time  -Check CBC after the visit     4.CKD-MBD(Mineral Bone Disease)/vitamin-D deficiency:  - Based on patients CKD stage following is the goal of therapy.  - Maintain calcium phosphorus product of < 55.  - Stage 3 CKD - Goal Ca 8.5-10 mg/dL , goal Phos 2.7-4.6 mg/dL  , goal iPTH 30-70 pg/mL  - Patient is currently at goal.  -  continue vitamin-D 2000 units p.o. Q.day.  Will likely adjust dose after the visit lab work results are back  - most recent vitamin-D level of 69.2 and intact PTH of 50.7 as of 10/30/2023  - check intact PTH and vitamin-D, prior to next visit and after today's visit     5. Lipids:  - on crestor  - goal LDL less than 70  - Management as per PCP     6. Hearing loss:  - management as per Primary team     7. COPD:  - Management as per primary team  - on inhalers  - follow-up with Pulmonary .     8. Nutrition:  - Encouraged patient to follow a renal diet  comprising of moderate potassium, low phosphorus and protein restriction to 0.8gm/kg.  - Will check serum albumin with next blood work.      9. Followup:  - Patient is to follow-up in 6 months, with lab work to be performed in after the visit and then again in a few days prior to the visit.  Advised patient to call me in 10 days to review the results if they do not hear back from me, as I may have not received the results.      DARIAN Byrnes, 3/6/2024, 11:21 AM             SUBJECTIVE: 78 y.o. male presents to the office for routine follow-up.  Feels well has no complaints no recent hospitalization we will get blood work in a few weeks prior to his appointment to his PCP all papers given today has been drinking at least 3 of the 32 ounce cups of water a day not checking blood pressures at home thankful for the care information that is gone today agreeable to attending CKD education/kidney smart.    Review of Systems   Constitutional:  Negative for chills and fever.   HENT:  Negative for congestion.    Respiratory:  Negative for cough and wheezing.    Cardiovascular:  Negative for leg swelling.   Gastrointestinal:  Negative for diarrhea.   Genitourinary:  Negative for difficulty urinating, dysuria and hematuria.   Musculoskeletal:  Negative for back pain.   Neurological:  Negative for dizziness and headaches.   Psychiatric/Behavioral:  Negative for agitation and confusion.    All other systems reviewed and are negative.      PAST MEDICAL HISTORY:  Past Medical History:   Diagnosis Date   • Asthma    • CKD (chronic kidney disease)    • Cyst of kidney, acquired 5/27/2020   • Hypertension    • Microalbuminuria    • Osteopenia 3/1/2018   • Tachycardia 2/12/2019   • Vitamin D deficiency 9/2/2020       PROBLEM LIST    Patient Active Problem List   Diagnosis   • Presbycusis of both ears   • Essential hypertension   • COPD (chronic obstructive pulmonary disease) (HCC)   • Hyperlipidemia   • Stage 3b chronic kidney  "disease (HCC)   • Vitamin D deficiency   • Persistent proteinuria   • Peripheral edema   • Encounter for screening colonoscopy   • Screening for lung cancer   • History of tobacco abuse   • Calculus of gallbladder without cholecystitis without obstruction       PAST SURGICAL HISTORY:  Past Surgical History:   Procedure Laterality Date   • APPENDECTOMY     • BEDSIDE SPIROMETRY WITH BRONCHODILATOR PRE/POST  4/21/2021   • HERNIA REPAIR     • STRABISMUS SURGERY         SOCIAL HISTORY :   reports that he quit smoking about 7 years ago. His smoking use included cigarettes. He started smoking about 60 years ago. He has a 26.5 pack-year smoking history. He has been exposed to tobacco smoke. He has never used smokeless tobacco. He reports current alcohol use. He reports that he does not use drugs.    FAMILY HISTORY:  Family History   Problem Relation Age of Onset   • No Known Problems Mother    • No Known Problems Father        ALLERGIES:  No Known Allergies        PHYSICAL EXAM:  Vitals:    03/06/24 1102   BP: 124/70   BP Location: Left arm   Patient Position: Sitting   Cuff Size: Standard   Pulse: 92   Weight: 88.5 kg (195 lb)   Height: 5' 8\" (1.727 m)       Body mass index is 29.65 kg/m².    Physical Exam  Vitals reviewed.   Constitutional:       General: He is not in acute distress.     Appearance: Normal appearance. He is normal weight. He is not ill-appearing, toxic-appearing or diaphoretic.   HENT:      Head: Normocephalic and atraumatic.      Mouth/Throat:      Mouth: Mucous membranes are moist.      Pharynx: No oropharyngeal exudate.   Eyes:      General: No scleral icterus.  Cardiovascular:      Rate and Rhythm: Normal rate.   Pulmonary:      Effort: No respiratory distress.      Breath sounds: Normal breath sounds. No stridor. No wheezing.   Abdominal:      Palpations: There is no mass.      Tenderness: There is no abdominal tenderness. There is no right CVA tenderness or left CVA tenderness.   Musculoskeletal:    " "     General: No swelling.      Cervical back: No rigidity.   Skin:     Coloration: Skin is not jaundiced.   Neurological:      General: No focal deficit present.      Mental Status: He is alert and oriented to person, place, and time.   Psychiatric:         Mood and Affect: Mood normal.         Behavior: Behavior normal.         LABORATORY DATA:     Results from last 6 Months   Lab Units 10/30/23  1411   POTASSIUM mmol/L 4.8   CHLORIDE mmol/L 103   CO2 mmol/L 27   BUN mg/dL 52*   CREATININE mg/dL 1.69*   CALCIUM mg/dL 9.9   PHOSPHORUS mg/dL 2.9            rest all reviewed    RADIOLOGY:  No orders to display     Rest all reviewed        MEDICATIONS:    Current Outpatient Medications:   •  albuterol (PROVENTIL HFA,VENTOLIN HFA) 90 mcg/act inhaler, Inhale 2 puffs every 6 (six) hours as needed for wheezing, Disp: 18 g, Rfl: 12  •  Cholecalciferol (Vitamin D) 50 MCG (2000 UT) CAPS, Take 5,000 Units by mouth daily, Disp: , Rfl:   •  furosemide (LASIX) 20 mg tablet, Take 1 tablet (20 mg total) by mouth every other day, Disp: 90 tablet, Rfl: 3  •  glycopyrrolate-formoterol (BEVESPI AEROSPHERE) 9-4.8 MCG/ACT inhaler, Inhale 2 puffs 2 (two) times a day, Disp: 10.7 g, Rfl: 12  •  lisinopril (ZESTRIL) 40 mg tablet, Take 1 tablet (40 mg total) by mouth every evening, Disp: 90 tablet, Rfl: 3  •  rosuvastatin (CRESTOR) 10 MG tablet, Take 1 tablet (10 mg total) by mouth daily, Disp: 30 tablet, Rfl: 5          Portions of the record may have been created with voice recognition software. Occasional wrong word or \"sound a like\" substitutions may have occurred due to the inherent limitations of voice recognition software. Read the chart carefully and recognize, using context, where substitutions have occurred.If you have any questions, please contact the dictating provider.      "

## 2024-03-18 ENCOUNTER — LAB (OUTPATIENT)
Dept: LAB | Facility: HOSPITAL | Age: 79
End: 2024-03-18
Payer: MEDICARE

## 2024-03-18 ENCOUNTER — TELEPHONE (OUTPATIENT)
Age: 79
End: 2024-03-18

## 2024-03-18 DIAGNOSIS — R80.1 PERSISTENT PROTEINURIA: ICD-10-CM

## 2024-03-18 DIAGNOSIS — N18.31 CKD STAGE G3A/A2, GFR 45-59 AND ALBUMIN CREATININE RATIO 30-299 MG/G (HCC): ICD-10-CM

## 2024-03-18 DIAGNOSIS — E78.5 HYPERLIPIDEMIA, UNSPECIFIED HYPERLIPIDEMIA TYPE: ICD-10-CM

## 2024-03-18 DIAGNOSIS — N18.32 STAGE 3B CHRONIC KIDNEY DISEASE (HCC): ICD-10-CM

## 2024-03-18 DIAGNOSIS — I10 ESSENTIAL HYPERTENSION: ICD-10-CM

## 2024-03-18 DIAGNOSIS — E55.9 VITAMIN D DEFICIENCY: ICD-10-CM

## 2024-03-18 LAB
25(OH)D3 SERPL-MCNC: 65.7 NG/ML (ref 30–100)
ALBUMIN SERPL BCP-MCNC: 4.3 G/DL (ref 3.5–5)
ANION GAP SERPL CALCULATED.3IONS-SCNC: 9 MMOL/L (ref 4–13)
BUN SERPL-MCNC: 43 MG/DL (ref 5–25)
CALCIUM SERPL-MCNC: 10 MG/DL (ref 8.4–10.2)
CHLORIDE SERPL-SCNC: 104 MMOL/L (ref 96–108)
CHOLEST SERPL-MCNC: 203 MG/DL
CO2 SERPL-SCNC: 26 MMOL/L (ref 21–32)
CREAT SERPL-MCNC: 1.86 MG/DL (ref 0.6–1.3)
CREAT UR-MCNC: 143.2 MG/DL
ERYTHROCYTE [DISTWIDTH] IN BLOOD BY AUTOMATED COUNT: 11.6 % (ref 11.6–15.1)
GFR SERPL CREATININE-BSD FRML MDRD: 34 ML/MIN/1.73SQ M
GLUCOSE P FAST SERPL-MCNC: 107 MG/DL (ref 65–99)
HCT VFR BLD AUTO: 46.4 % (ref 36.5–49.3)
HDLC SERPL-MCNC: 42 MG/DL
HGB BLD-MCNC: 15.8 G/DL (ref 12–17)
LDLC SERPL CALC-MCNC: 138 MG/DL (ref 0–100)
MAGNESIUM SERPL-MCNC: 1.8 MG/DL (ref 1.9–2.7)
MCH RBC QN AUTO: 31.3 PG (ref 26.8–34.3)
MCHC RBC AUTO-ENTMCNC: 34.1 G/DL (ref 31.4–37.4)
MCV RBC AUTO: 92 FL (ref 82–98)
MICROALBUMIN UR-MCNC: 11.5 MG/L
MICROALBUMIN/CREAT 24H UR: 8 MG/G CREATININE (ref 0–30)
NONHDLC SERPL-MCNC: 161 MG/DL
PHOSPHATE SERPL-MCNC: 2.3 MG/DL (ref 2.3–4.1)
PLATELET # BLD AUTO: 259 THOUSANDS/UL (ref 149–390)
PMV BLD AUTO: 9.1 FL (ref 8.9–12.7)
POTASSIUM SERPL-SCNC: 5.1 MMOL/L (ref 3.5–5.3)
PTH-INTACT SERPL-MCNC: 34.6 PG/ML (ref 12–88)
RBC # BLD AUTO: 5.05 MILLION/UL (ref 3.88–5.62)
SODIUM SERPL-SCNC: 139 MMOL/L (ref 135–147)
TRIGL SERPL-MCNC: 117 MG/DL
WBC # BLD AUTO: 6.67 THOUSAND/UL (ref 4.31–10.16)

## 2024-03-18 PROCEDURE — 82306 VITAMIN D 25 HYDROXY: CPT

## 2024-03-18 PROCEDURE — 85027 COMPLETE CBC AUTOMATED: CPT

## 2024-03-18 PROCEDURE — 80069 RENAL FUNCTION PANEL: CPT

## 2024-03-18 PROCEDURE — 80061 LIPID PANEL: CPT

## 2024-03-18 PROCEDURE — 36415 COLL VENOUS BLD VENIPUNCTURE: CPT

## 2024-03-18 PROCEDURE — 82570 ASSAY OF URINE CREATININE: CPT

## 2024-03-18 PROCEDURE — 82043 UR ALBUMIN QUANTITATIVE: CPT

## 2024-03-18 PROCEDURE — 83735 ASSAY OF MAGNESIUM: CPT

## 2024-03-18 PROCEDURE — 83970 ASSAY OF PARATHORMONE: CPT

## 2024-03-18 NOTE — TELEPHONE ENCOUNTER
The pt called to let Dr. Jara know that he went for his labs today. According to his office note (looking at #9 Followup) it was stated to touch base when he goes if he doesn't hear from her. He wanted to pass along he went and please call him with any changes or updates. Thanks!

## 2024-03-19 NOTE — RESULT ENCOUNTER NOTE
Please let the patient know that most recent lab work in terms of renal parameters are stable at baseline.    Please advise him to decrease his vitamin D to 1000 units a day and also to follow low potassium diet since his potassium is slightly borderline on the higher side of normal thank you  Will discuss further at the upcoming visit, let me know if they have any questions or concerns.    Thanks

## 2024-03-19 NOTE — TELEPHONE ENCOUNTER
Lm for Edwin, creatinine is stable. Follow a low potassium diet at this time.   Decrease vitamin d supplement to 1000 units daily as level is increased.

## 2024-05-02 NOTE — TELEPHONE ENCOUNTER
Pt was scheduled to come into appt on 3/23, he did not do labs so we do not need to see him in the office until he does so  Called and informed him of this and he said he will go for labs as soon as he is able to  Would you please sign this for Karina?

## 2024-05-30 ENCOUNTER — OFFICE VISIT (OUTPATIENT)
Dept: FAMILY MEDICINE CLINIC | Facility: CLINIC | Age: 79
End: 2024-05-30

## 2024-05-30 VITALS
WEIGHT: 189 LBS | OXYGEN SATURATION: 99 % | HEART RATE: 140 BPM | DIASTOLIC BLOOD PRESSURE: 74 MMHG | TEMPERATURE: 97.5 F | BODY MASS INDEX: 28.64 KG/M2 | SYSTOLIC BLOOD PRESSURE: 132 MMHG | HEIGHT: 68 IN | RESPIRATION RATE: 18 BRPM

## 2024-05-30 DIAGNOSIS — N18.32 STAGE 3B CHRONIC KIDNEY DISEASE (HCC): ICD-10-CM

## 2024-05-30 DIAGNOSIS — S46.212A RUPTURE OF LEFT BICEPS TENDON, INITIAL ENCOUNTER: Primary | ICD-10-CM

## 2024-05-30 DIAGNOSIS — J41.8 MIXED SIMPLE AND MUCOPURULENT CHRONIC BRONCHITIS (HCC): ICD-10-CM

## 2024-05-30 DIAGNOSIS — Z23 ENCOUNTER FOR IMMUNIZATION: ICD-10-CM

## 2024-05-30 DIAGNOSIS — J44.9 CHRONIC OBSTRUCTIVE PULMONARY DISEASE, UNSPECIFIED COPD TYPE (HCC): ICD-10-CM

## 2024-05-30 DIAGNOSIS — Z23 NEED FOR COVID-19 VACCINE: ICD-10-CM

## 2024-05-30 DIAGNOSIS — I10 ESSENTIAL HYPERTENSION: ICD-10-CM

## 2024-05-30 DIAGNOSIS — E78.5 HYPERLIPIDEMIA, UNSPECIFIED HYPERLIPIDEMIA TYPE: ICD-10-CM

## 2024-05-30 PROBLEM — Z87.891 HISTORY OF TOBACCO ABUSE: Status: RESOLVED | Noted: 2023-04-24 | Resolved: 2024-05-30

## 2024-05-30 PROCEDURE — 90750 HZV VACC RECOMBINANT IM: CPT | Performed by: FAMILY MEDICINE

## 2024-05-30 PROCEDURE — 90471 IMMUNIZATION ADMIN: CPT | Performed by: FAMILY MEDICINE

## 2024-05-30 PROCEDURE — 99214 OFFICE O/P EST MOD 30 MIN: CPT | Performed by: FAMILY MEDICINE

## 2024-05-30 PROCEDURE — 91320 SARSCV2 VAC 30MCG TRS-SUC IM: CPT | Performed by: FAMILY MEDICINE

## 2024-05-30 PROCEDURE — 90480 ADMN SARSCOV2 VAC 1/ONLY CMP: CPT | Performed by: FAMILY MEDICINE

## 2024-05-30 RX ORDER — ROSUVASTATIN CALCIUM 10 MG/1
10 TABLET, COATED ORAL DAILY
Qty: 30 TABLET | Refills: 5 | Status: SHIPPED | OUTPATIENT
Start: 2024-05-30

## 2024-05-30 NOTE — PROGRESS NOTES
Ambulatory Visit  Name: Edwin Valenzuela Jr.      : 1945      MRN: [id number]  Encounter Provider: Alonzo Dorado MD  Encounter Date: 2024   Encounter department: [facility]    Assessment & Plan   1. Rupture of left biceps tendon, initial encounter  Assessment & Plan:  Proximal biceps tendon rupture  Continue conservative management  Referral to orthopedic  Orders:  -     Ambulatory Referral to Orthopedic Surgery; Future  2. Mixed simple and mucopurulent chronic bronchitis (HCC)  Assessment & Plan:  Stable  Continue Bevespi and albuterol as needed  3. Chronic obstructive pulmonary disease, unspecified COPD type (HCC)  Assessment & Plan:  Stable  Continue Bevespi and albuterol as needed  Orders:  -     glycopyrrolate-formoterol (BEVESPI AEROSPHERE) 9-4.8 MCG/ACT inhaler; Inhale 2 puffs 2 (two) times a day  4. Essential hypertension  Assessment & Plan:  Well controlled  Continue lasix and lisinopril  High ASCVD score of 34.8  Recommend that he start statin   Orders:  -     rosuvastatin (CRESTOR) 10 MG tablet; Take 1 tablet (10 mg total) by mouth daily  5. Need for COVID-19 vaccine  -     COVID-19 Pfizer mRNA vaccine 12 yr and older (GRAY cap vial or pre-filled syringe)  6. Encounter for immunization  -     Zoster Vaccine Recombinant IM  7. Stage 3b chronic kidney disease (HCC)  Assessment & Plan:  Lab Results   Component Value Date    EGFR 34 2024    EGFR 38 10/30/2023    EGFR 35 2023    CREATININE 1.86 (H) 2024    CREATININE 1.69 (H) 10/30/2023    CREATININE 1.80 (H) 2023   Renal function stable  Avoid nephrotoxic agents  Follow-up with nephrology as outpatient  8. Hyperlipidemia, unspecified hyperlipidemia type  Assessment & Plan:  Recommend that he start taking statin for high ASCVD risk reduction and to lower his LDL.  His recent LDL is 138.  Goal is less than 100.       History of Present Illness       78-year-old male with a  "history of COPD and hypertension who presents today for routine follow-up.  Patient did note that he fell about a month ago after he tripped on the curb on the street.  He felt pain in his left proximal biceps afterwards.  His pain is mild and tolerable.  He is tolerating with oral over-the-counter analgesia.  He is taking his blood pressure medications regularly.          Review of Systems   Constitutional:  Negative for appetite change, chills, diaphoresis, fatigue and fever.   Eyes:  Negative for visual disturbance.   Respiratory:  Negative for cough, shortness of breath and wheezing.    Cardiovascular:  Negative for chest pain, palpitations and leg swelling.   Gastrointestinal:  Negative for abdominal pain, blood in stool, constipation, diarrhea, nausea and vomiting.   Genitourinary:  Negative for difficulty urinating, dysuria, frequency, hematuria and urgency.   Musculoskeletal:  Negative for arthralgias.   Skin:  Negative for rash.   Neurological:  Negative for dizziness, tremors, weakness, light-headedness, numbness and headaches.   Psychiatric/Behavioral:  Negative for confusion.        Objective     /74 (BP Location: Left arm, Patient Position: Sitting, Cuff Size: Standard)   Pulse (!) 140   Temp 97.5 °F (36.4 °C) (Temporal)   Resp 18   Ht 5' 8\" (1.727 m)   Wt 85.7 kg (189 lb)   SpO2 99%   BMI 28.74 kg/m²     Physical Exam  Vitals reviewed.   Constitutional:       General: He is not in acute distress.     Appearance: Normal appearance. He is well-developed. He is not ill-appearing, toxic-appearing or diaphoretic.   HENT:      Head: Normocephalic and atraumatic.      Right Ear: Tympanic membrane, ear canal and external ear normal. There is no impacted cerumen.      Left Ear: Tympanic membrane, ear canal and external ear normal. There is no impacted cerumen.      Nose: Nose normal.      Mouth/Throat:      Mouth: Mucous membranes are moist.      Pharynx: No oropharyngeal exudate or posterior " oropharyngeal erythema.   Eyes:      General: No scleral icterus.        Right eye: No discharge.         Left eye: No discharge.      Extraocular Movements: Extraocular movements intact.      Conjunctiva/sclera: Conjunctivae normal.      Pupils: Pupils are equal, round, and reactive to light.   Cardiovascular:      Rate and Rhythm: Normal rate and regular rhythm.      Heart sounds: Normal heart sounds. No murmur heard.     No friction rub. No gallop.   Pulmonary:      Effort: Pulmonary effort is normal. No respiratory distress.      Breath sounds: Normal breath sounds. No stridor. No wheezing or rhonchi.   Abdominal:      General: Bowel sounds are normal. There is no distension.      Palpations: Abdomen is soft. There is no mass.      Tenderness: There is no abdominal tenderness. There is no guarding.      Hernia: No hernia is present.   Musculoskeletal:         General: No tenderness. Normal range of motion.      Cervical back: Normal range of motion.      Right lower leg: No edema.      Left lower leg: No edema.      Comments: Left bicep obvious Murray deformity   Skin:     General: Skin is warm.      Capillary Refill: Capillary refill takes less than 2 seconds.      Findings: No rash.   Neurological:      General: No focal deficit present.      Mental Status: He is alert and oriented to person, place, and time.      Cranial Nerves: No cranial nerve deficit.      Motor: No weakness.      Gait: Gait normal.   Psychiatric:         Mood and Affect: Mood normal.         Behavior: Behavior normal.       Administrative Statements

## 2024-05-30 NOTE — ASSESSMENT & PLAN NOTE
Lab Results   Component Value Date    EGFR 34 03/18/2024    EGFR 38 10/30/2023    EGFR 35 04/28/2023    CREATININE 1.86 (H) 03/18/2024    CREATININE 1.69 (H) 10/30/2023    CREATININE 1.80 (H) 04/28/2023   Renal function stable  Avoid nephrotoxic agents  Follow-up with nephrology as outpatient

## 2024-05-30 NOTE — ASSESSMENT & PLAN NOTE
Well controlled  Continue lasix and lisinopril  High ASCVD score of 34.8  Recommend that he start statin

## 2024-05-30 NOTE — ASSESSMENT & PLAN NOTE
Recommend that he start taking statin for high ASCVD risk reduction and to lower his LDL.  His recent LDL is 138.  Goal is less than 100.

## 2024-06-03 NOTE — Clinical Note
Can you please help patient with scheduling colonoscopy, AAA screening, and PFTs? Thank you  [facility]  [facility]  [address] [place] [address]    Amy 3, 2024    Cynthia Lyons  [address] [facility] [address]    Dear Cynthia,  Your Care Team congratulates you on your journey to maintain wellness. This document will help guide you on your journey to maintain a healthy lifestyle.  You can use this to help you overcome any barriers you may encounter.  If you should have any questions or concerns, you can contact the members of your Care Team or contact your Primary Care Clinic for assistance.     Health Maintenance  Health Maintenance Reviewed:    Health Maintenance Due   Topic Date Due    HEPATITIS A IMMUNIZATION (1 of 2 - Risk 2-dose series) Never done    DTAP/TDAP/TD IMMUNIZATION (2 - Td or Tdap) 02/05/2019    ZOSTER IMMUNIZATION (1 of 2) Never done    LUNG CANCER SCREENING  Never done    COVID-19 Vaccine (2 - 2023-24 season) 09/01/2023     My Access Plan  Medical Emergency 911   Primary Clinic Line [facility] - [phone]   24 Hour Appointment Line [phone] or  [phone]) (toll-free)   24 Hour Nurse Line [phone] (toll-free)   Preferred Urgent Care     Preferred Hospital     Preferred Pharmacy [facility] [address] [place] [address]     Behavioral Health Crisis Line The National Suicide Prevention Lifeline at 1-237.329.9055 or 911     My Care Team Members  Patient Care Team         Relationship Specialty Notifications Start End    Rosaura Flores PA-C PCP - General   6/28/19     Phone: [phone] Fax: [phone] [address] [place] [address]    Rosaura Flores PA-C Assigned PCP   10/16/16     Phone: [phone] Fax: [phone] [address] [place] [address]    Lenin Ferrell, PharmD Pharmacist Pharmacist  2/7/22     Phone: [phone]          [facility] [address] [place]  [address]    Robbi Pedraza MD MD Otolaryngology  2/28/23     Phone: [phone] Fax: [phone]         [address] [facility] [address]    Cherie Daugherty LSW Lead Care Coordinator Primary Care - CC Admissions 9/27/23 6/3/24    Phone: [phone]          [address] [facility] [address]    Rosaura Flores PA-C Assigned Pain Medication Provider   2/23/24     Phone: [phone] Fax: [phone] [address] [place] [address]    Ashley Murillo CHW Community Health Worker Primary Care - [facility] Admissions 3/18/24 6/3/24              Advance Care Plans/Directives Type:      We notice that you do not have an Advance Directive on file. Upon completion of your Health Care Directive, please bring a copy with you to your next office visit.    Honoring Choices    Advance Care Planning and Health Care Directives  When it comes to decisions about your health care, it s important that your voice is heard. You may not always be able to speak for yourself.    We encourage you to have discussions with your loved ones, cultural or spiritual leaders and health care providers about your goals, values, beliefs and choices.    We are a part of [employer] [place] , supporting and promoting the benefits of advance care planning conversations.    Our goals are to:  Help you make informed decisions about your healthcare choices and ensure that those choices are honored  Offer advance care planning discussions with trained staff  Ensure your choices are clearly defined, documented and available in your medical record  Translate your choices into medical orders as needed    Why is Advance Care Planning important?  Know what your health care choices are and decide what is right for you  An unexpected illness or injury could leave you unable to participate in important treatment decisions  When choices are left to others to decide that responsibility can be difficult and stressful  By discussing and outlining  your choices, your voice is heard in the care you want to receive    How can I learn more?  We offer free classes at multiple locations, days and times. Our trained facilitators will provide information and guide you through a Health Care Directive document. They can also review, notarize and add your document to your medical record.    Call [employer] at [phone] or toll free at [phone] for assistance.      It has been your Clinic Care Team's pleasure to work with you on accomplishing your goals.    Regards,    Your Clinic Care Team

## 2024-06-04 ENCOUNTER — PATIENT OUTREACH (OUTPATIENT)
Dept: OTHER | Facility: OTHER | Age: 79
End: 2024-06-04

## 2024-06-04 NOTE — PROGRESS NOTES
Client presented to  at [facility] during outreach hours.    Client has an Orthopedic appt on 6/7 at 1:30 PM . He needs transportation.    Writer called Orthopedic office at [phone]. Requesting Lyft transportation.    Spoke with Virginia from Ortho office- Said cannot provide lyft unless client is established and meets criteria.     Spoke with Carleen from Ortho office to get location of Ortho office.   Client to arrive at 1:15 PM , June 7    [address] [place] [address]    Client is asking if there is a bus that goes to building. Carleen and her office staff unaware if bus goes to building.     Client will stop by bus transfer station to ask.     Client pleasant in this encounter. Supportive listening provided. Client verbalized appreciation for assistance.

## 2024-06-05 ENCOUNTER — PATIENT OUTREACH (OUTPATIENT)
Dept: OTHER | Facility: OTHER | Age: 79
End: 2024-06-05

## 2024-06-07 ENCOUNTER — OFFICE VISIT (OUTPATIENT)
Dept: OBGYN CLINIC | Facility: CLINIC | Age: 79
End: 2024-06-07
Payer: MEDICARE

## 2024-06-07 VITALS
HEART RATE: 76 BPM | BODY MASS INDEX: 28.19 KG/M2 | HEIGHT: 68 IN | WEIGHT: 186 LBS | DIASTOLIC BLOOD PRESSURE: 66 MMHG | SYSTOLIC BLOOD PRESSURE: 154 MMHG

## 2024-06-07 DIAGNOSIS — S46.212A RUPTURE OF LEFT BICEPS TENDON, INITIAL ENCOUNTER: Primary | ICD-10-CM

## 2024-06-07 PROCEDURE — 99203 OFFICE O/P NEW LOW 30 MIN: CPT | Performed by: ORTHOPAEDIC SURGERY

## 2024-06-07 NOTE — PROGRESS NOTES
Client texted writer asking when writer will arrive at [facility] for outreach hours. Writer notified client that I will not be there today. Writer inquired if client was able to find out if bus transports to Orthopedic office.    Client texted writer that there is a bus that goes to the Orthopedic office.

## 2024-06-11 NOTE — PROGRESS NOTES
CHIEF COMPLAIN/REASON FOR VISIT  Chief Complaint   Patient presents with    Left Upper Arm - Pain       HISTORY OF PRESENT ILLNESS  Edwin Valenzuela Jr. is a RHD 78 y.o. male who presents for evaluation of his left arm/shoulder.  Patient felt a pop several weeks ago in the left upper arm, followed by a deformity/lump in the anterior arm.  He denies any numbness ting or paresthesias in affected extremity.  No pain currently.  No other orthopedic complaints today.    REVIEW OF SYSTEMS  Review of systems was performed and, woutside that mentioned in the HPI, it was negative for symptomology related to the integumentary, hematologic, immunologic, allergic, neurologic, cardiovascular, respiratory, GI or  systems.     MEDICAL HISTORY  Patient Active Problem List   Diagnosis    Presbycusis of both ears    Essential hypertension    COPD (chronic obstructive pulmonary disease) (HCC)    Hyperlipidemia    Stage 3b chronic kidney disease (HCC)    Vitamin D deficiency    Persistent proteinuria    Peripheral edema    Encounter for screening colonoscopy    Screening for lung cancer    Calculus of gallbladder without cholecystitis without obstruction    Rupture of left biceps tendon       SURGICAL HISTORY  Past Surgical History:   Procedure Laterality Date    APPENDECTOMY      BEDSIDE SPIROMETRY WITH BRONCHODILATOR PRE/POST  4/21/2021    HERNIA REPAIR      STRABISMUS SURGERY         CURRENT MEDICATIONS    Current Outpatient Medications:     albuterol (PROVENTIL HFA,VENTOLIN HFA) 90 mcg/act inhaler, Inhale 2 puffs every 6 (six) hours as needed for wheezing, Disp: 18 g, Rfl: 12    Cholecalciferol (Vitamin D) 50 MCG (2000 UT) CAPS, Take 5,000 Units by mouth daily, Disp: , Rfl:     furosemide (LASIX) 20 mg tablet, Take 1 tablet (20 mg total) by mouth every other day, Disp: 90 tablet, Rfl: 3    glycopyrrolate-formoterol (BEVESPI AEROSPHERE) 9-4.8 MCG/ACT inhaler, Inhale 2 puffs 2 (two) times a day, Disp: 10.7 g, Rfl: 12    lisinopril  (ZESTRIL) 40 mg tablet, Take 1 tablet (40 mg total) by mouth every evening, Disp: 90 tablet, Rfl: 3    rosuvastatin (CRESTOR) 10 MG tablet, Take 1 tablet (10 mg total) by mouth daily, Disp: 30 tablet, Rfl: 5    SOCIAL HISTORY  Social History     Socioeconomic History    Marital status: Single     Spouse name: Not on file    Number of children: Not on file    Years of education: Not on file    Highest education level: Not on file   Occupational History    Not on file   Tobacco Use    Smoking status: Former     Current packs/day: 0.00     Average packs/day: 0.5 packs/day for 53.0 years (26.5 ttl pk-yrs)     Types: Cigarettes     Start date:      Quit date:      Years since quittin.4     Passive exposure: Past    Smokeless tobacco: Never   Vaping Use    Vaping status: Never Used   Substance and Sexual Activity    Alcohol use: Yes     Comment: Rarely; once a year    Drug use: Never    Sexual activity: Not on file   Other Topics Concern    Not on file   Social History Narrative    Not on file     Social Determinants of Health     Financial Resource Strain: Low Risk  (2024)    Overall Financial Resource Strain (CARDIA)     Difficulty of Paying Living Expenses: Not hard at all   Food Insecurity: No Food Insecurity (2024)    Hunger Vital Sign     Worried About Running Out of Food in the Last Year: Never true     Ran Out of Food in the Last Year: Never true   Transportation Needs: No Transportation Needs (2024)    PRAPARE - Transportation     Lack of Transportation (Medical): No     Lack of Transportation (Non-Medical): No   Physical Activity: Not on file   Stress: Not on file   Social Connections: Not on file   Intimate Partner Violence: Not on file   Housing Stability: Low Risk  (2024)    Housing Stability Vital Sign     Unable to Pay for Housing in the Last Year: No     Number of Times Moved in the Last Year: 1     Homeless in the Last Year: No       Objective     VITAL SIGNS  /66 (BP  "Location: Right arm, Patient Position: Sitting, Cuff Size: Adult)   Pulse 76   Ht 5' 8\" (1.727 m) Comment: verbal  Wt 84.4 kg (186 lb)   BMI 28.28 kg/m²      PHYSICAL EXAM  General:   Well-appearing  No acute distress  Appears stated age    Cervical Spine  No tenderness to palpation over the cervical spine in the midline or of the paraspinal muscles  Full range of motion without pain  Negative spurlings   Negative Lhermitte test    Left Shoulder  Negative tenderness to palpation over the acromioclavicular joint  Negative tenderness to palpation over the long head of the biceps tendon  Shoulder effusion none present  Shoulder abduction 180/180  Shoulder forward flexion 180/180  Shoulder external rotation 50/50  Shoulder internal rotation T7/T7  Supraspinatus/ABD 5/5   Infraspinatus/ER 5/5   Negative Belly Press/SS  Negative Neer  Negative Evans  Negative O'briens  + marlene deformity  Skin is intact with no erythema, warmth or drainage  Motor strength intact distally  Sensation to light touch is normal in the axillary, radial, ulnar, and median nerve distributions.  Fingers warm and perfused    RADIOGRAPHIC EXAMINATION/DIAGNOSTICS:  No results found.    ASSESSMENT/PLAN:  Left long proximal head biceps rupture.     Today, we discussed the non-operative treatment options that include, but are not limited to: rest, ice, activity modification, anti-inflammatory medication, physical therapy, and/or injections. Patient with like to initially proceed with these conservative measures. After discussion of options for formal physical therapy, anti-inflammatories and bracing with other conservative treatments, patient opted to trial these initially.   he will plan on following up in 3 months for recheck p.r.n., they voiced their understanding of this plan and were in agreement with it. All questions answered today.        "

## 2024-06-28 ENCOUNTER — TELEPHONE (OUTPATIENT)
Dept: FAMILY MEDICINE CLINIC | Facility: CLINIC | Age: 79
End: 2024-06-28

## 2024-06-28 NOTE — TELEPHONE ENCOUNTER
VM:06/24/2024     Good morning. These messages from [facility] hold care in regards of Eleni the world Roxie date of birth [birth year]. We are trying to find out if the rosuvastatin 10 milligrams is still active on permanently He's medlist. Last time the  pharmacy field 30 peel for 30 days was [place] May 30. If the medicine has been discontinued, please give me a call back to my direct and secure line [phone]. If the member has run out of refills, please send another prescription to the  pharmacy. Thank you, Have a great day. Stay safe.    Contacted Yuli and informed her pt has 5 refills available for medication inquired. Original script was sent 05/30/2024.

## 2024-09-03 ENCOUNTER — TELEPHONE (OUTPATIENT)
Dept: FAMILY MEDICINE CLINIC | Facility: CLINIC | Age: 79
End: 2024-09-03

## 2024-09-03 NOTE — TELEPHONE ENCOUNTER
Pt had 9/3/24 appt and was canceled due to pcp not in office. Left detailed message and to call us back with rescheduling appt. If pt calls please assist with scheduling appt.   Received message from call center, pt was c/o abdominal pain, ST on 9/30.   Contacted mother, pt is doing well today, sx were improved on 10/1.   Mom to call if sx recur or other sx develop.    Pt was here as a visitor with his wife. Pt stated they were told some results about his wife, he got dizzy and had a syncopal episode. Pt currently denies CP, SOB, dizziness, no LE edema, did not hit his head. Daughter at bedside. Pt placed on cardiac monitor, will maintain safety.

## 2024-09-05 ENCOUNTER — RA CDI HCC (OUTPATIENT)
Dept: OTHER | Facility: HOSPITAL | Age: 79
End: 2024-09-05

## 2024-09-05 PROBLEM — Z12.11 ENCOUNTER FOR SCREENING COLONOSCOPY: Status: RESOLVED | Noted: 2023-03-27 | Resolved: 2024-09-05

## 2024-09-11 ENCOUNTER — OFFICE VISIT (OUTPATIENT)
Dept: FAMILY MEDICINE CLINIC | Facility: CLINIC | Age: 79
End: 2024-09-11

## 2024-09-11 VITALS
RESPIRATION RATE: 16 BRPM | HEIGHT: 68 IN | WEIGHT: 191 LBS | OXYGEN SATURATION: 96 % | SYSTOLIC BLOOD PRESSURE: 150 MMHG | HEART RATE: 96 BPM | BODY MASS INDEX: 28.95 KG/M2 | DIASTOLIC BLOOD PRESSURE: 50 MMHG | TEMPERATURE: 97.6 F

## 2024-09-11 DIAGNOSIS — Z23 ENCOUNTER FOR IMMUNIZATION: ICD-10-CM

## 2024-09-11 DIAGNOSIS — E66.3 OVERWEIGHT (BMI 25.0-29.9): ICD-10-CM

## 2024-09-11 DIAGNOSIS — N18.31 CKD STAGE G3A/A2, GFR 45-59 AND ALBUMIN CREATININE RATIO 30-299 MG/G (HCC): ICD-10-CM

## 2024-09-11 DIAGNOSIS — I10 ESSENTIAL HYPERTENSION: Primary | ICD-10-CM

## 2024-09-11 DIAGNOSIS — E78.5 HYPERLIPIDEMIA, UNSPECIFIED HYPERLIPIDEMIA TYPE: ICD-10-CM

## 2024-09-11 DIAGNOSIS — R80.1 PERSISTENT PROTEINURIA: ICD-10-CM

## 2024-09-11 DIAGNOSIS — J44.9 CHRONIC OBSTRUCTIVE PULMONARY DISEASE, UNSPECIFIED COPD TYPE (HCC): ICD-10-CM

## 2024-09-11 DIAGNOSIS — E55.9 VITAMIN D DEFICIENCY: ICD-10-CM

## 2024-09-11 PROCEDURE — 99214 OFFICE O/P EST MOD 30 MIN: CPT | Performed by: FAMILY MEDICINE

## 2024-09-11 PROCEDURE — G0008 ADMIN INFLUENZA VIRUS VAC: HCPCS | Performed by: FAMILY MEDICINE

## 2024-09-11 PROCEDURE — 90662 IIV NO PRSV INCREASED AG IM: CPT | Performed by: FAMILY MEDICINE

## 2024-09-11 RX ORDER — FUROSEMIDE 20 MG
20 TABLET ORAL EVERY OTHER DAY
Qty: 90 TABLET | Refills: 3 | Status: SHIPPED | OUTPATIENT
Start: 2024-09-11

## 2024-09-11 RX ORDER — ROSUVASTATIN CALCIUM 10 MG/1
10 TABLET, COATED ORAL DAILY
Qty: 90 TABLET | Refills: 2 | Status: SHIPPED | OUTPATIENT
Start: 2024-09-11

## 2024-09-11 RX ORDER — ALBUTEROL SULFATE 90 UG/1
2 AEROSOL, METERED RESPIRATORY (INHALATION) EVERY 6 HOURS PRN
Qty: 18 G | Refills: 12 | Status: SHIPPED | OUTPATIENT
Start: 2024-09-11

## 2024-09-11 RX ORDER — LISINOPRIL 40 MG/1
40 TABLET ORAL EVERY EVENING
Qty: 90 TABLET | Refills: 3 | Status: SHIPPED | OUTPATIENT
Start: 2024-09-11

## 2024-09-11 RX ORDER — MULTIVIT-MIN/IRON/FOLIC ACID/K 18-600-40
5000 CAPSULE ORAL DAILY
Qty: 75 CAPSULE | Refills: 0 | Status: SHIPPED | OUTPATIENT
Start: 2024-09-11 | End: 2024-10-11

## 2024-09-11 NOTE — ASSESSMENT & PLAN NOTE
Did not take blood pressure medications today  -Continue lisinopril and Lasix  -Stress importance of medication adherence  -Recommend low-sodium diet    Orders:    Comprehensive metabolic panel; Future    CBC and differential; Future    lisinopril (ZESTRIL) 40 mg tablet; Take 1 tablet (40 mg total) by mouth every evening    rosuvastatin (CRESTOR) 10 MG tablet; Take 1 tablet (10 mg total) by mouth daily    furosemide (LASIX) 20 mg tablet; Take 1 tablet (20 mg total) by mouth every other day

## 2024-09-11 NOTE — ASSESSMENT & PLAN NOTE
Orders:    Cholecalciferol (Vitamin D) 50 MCG (2000 UT) CAPS; Take 2.5 capsules (5,000 Units total) by mouth daily

## 2024-09-11 NOTE — PROGRESS NOTES
Ambulatory Visit  Name: Edwin Valenzuela Jr.      : 1945      MRN: [id number]  Encounter Provider: Alonzo Dorado MD  Encounter Date: 2024   Encounter department: [facility]    Assessment & Plan  Essential hypertension  Did not take blood pressure medications today  -Continue lisinopril and Lasix  -Stress importance of medication adherence  -Recommend low-sodium diet    Orders:    Comprehensive metabolic panel; Future    CBC and differential; Future    lisinopril (ZESTRIL) 40 mg tablet; Take 1 tablet (40 mg total) by mouth every evening    rosuvastatin (CRESTOR) 10 MG tablet; Take 1 tablet (10 mg total) by mouth daily    furosemide (LASIX) 20 mg tablet; Take 1 tablet (20 mg total) by mouth every other day    Chronic obstructive pulmonary disease, unspecified COPD type (HCC)  Stable  Continue Bevespi and albuterol as needed  Up-to-date with flu shot    Orders:    glycopyrrolate-formoterol (BEVESPI AEROSPHERE) 9-4.8 MCG/ACT inhaler; Inhale 2 puffs 2 (two) times a day    albuterol (PROVENTIL HFA,VENTOLIN HFA) 90 mcg/act inhaler; Inhale 2 puffs every 6 (six) hours as needed for wheezing    CKD stage G3a/A2, GFR 45-59 and albumin creatinine ratio  mg/g  Lab Results   Component Value Date    EGFR 34 2024    EGFR 38 10/30/2023    EGFR 35 2023    CREATININE 1.86 (H) 2024    CREATININE 1.69 (H) 10/30/2023    CREATININE 1.80 (H) 2023   Avoid nephrotoxic agents  Recheck renal function  Follow-up with nephrology outpatient    Orders:    furosemide (LASIX) 20 mg tablet; Take 1 tablet (20 mg total) by mouth every other day    Hyperlipidemia, unspecified hyperlipidemia type  Continue statin  Recheck lipid panel  Orders:    Lipid panel; Future    Vitamin D deficiency    Orders:    Cholecalciferol (Vitamin D) 50 MCG ( UT) CAPS; Take 2.5 capsules (5,000 Units total) by mouth daily    Encounter for immunization    Orders:    influenza  "vaccine, high-dose, PF 0.5 mL (Fluzone High Dose)    Persistent proteinuria    Orders:    furosemide (LASIX) 20 mg tablet; Take 1 tablet (20 mg total) by mouth every other day    Overweight (BMI 25.0-29.9)         BMI Counseling: Body mass index is 29.04 kg/m². The BMI is above normal. Nutrition recommendations include decreasing portion sizes, encouraging healthy choices of fruits and vegetables, decreasing fast food intake, consuming healthier snacks, limiting drinks that contain sugar, moderation in carbohydrate intake and reducing intake of cholesterol. Exercise recommendations include moderate physical activity 150 minutes/week. Rationale for BMI follow-up plan is due to patient being overweight or obese.       History of Present Illness     78-year-old male with past medical history of hypertension, hyperlipidemia, and COPD who presents today for follow-up.  He is doing okay overall.  He has no new concerns today.  He forgot to take his blood pressure medications today.  He has not had any recent hospitalization          Review of Systems   Constitutional:  Negative for appetite change, chills, diaphoresis, fatigue and fever.   Eyes:  Negative for visual disturbance.   Respiratory:  Negative for cough, shortness of breath and wheezing.    Cardiovascular:  Negative for chest pain, palpitations and leg swelling.   Gastrointestinal:  Negative for abdominal pain, blood in stool, constipation, diarrhea, nausea and vomiting.   Genitourinary:  Negative for difficulty urinating, dysuria, frequency, hematuria and urgency.   Musculoskeletal:  Negative for arthralgias.   Skin:  Negative for rash.   Neurological:  Negative for dizziness, tremors, weakness, light-headedness, numbness and headaches.   Psychiatric/Behavioral:  Negative for confusion.            Objective     /50 (BP Location: Right arm, Patient Position: Sitting, Cuff Size: Large)   Pulse 96   Temp 97.6 °F (36.4 °C) (Temporal)   Resp 16   Ht 5' 8\" " (1.727 m)   Wt 86.6 kg (191 lb)   SpO2 96%   BMI 29.04 kg/m²     Physical Exam  Vitals reviewed.   Constitutional:       General: He is not in acute distress.     Appearance: Normal appearance. He is well-developed. He is not ill-appearing, toxic-appearing or diaphoretic.   HENT:      Head: Normocephalic and atraumatic.      Right Ear: External ear normal.      Left Ear: External ear normal.      Nose: Nose normal.      Mouth/Throat:      Mouth: Mucous membranes are moist.      Pharynx: No oropharyngeal exudate or posterior oropharyngeal erythema.   Eyes:      General: No scleral icterus.        Right eye: No discharge.         Left eye: No discharge.      Extraocular Movements: Extraocular movements intact.      Conjunctiva/sclera: Conjunctivae normal.   Cardiovascular:      Rate and Rhythm: Normal rate and regular rhythm.      Heart sounds: Normal heart sounds. No murmur heard.     No friction rub. No gallop.   Pulmonary:      Effort: Pulmonary effort is normal. No respiratory distress.      Breath sounds: No stridor. Wheezing present. No rhonchi.   Abdominal:      General: Bowel sounds are normal. There is no distension.      Palpations: Abdomen is soft. There is no mass.      Tenderness: There is no abdominal tenderness. There is no guarding.   Musculoskeletal:         General: No swelling or tenderness. Normal range of motion.      Cervical back: Normal range of motion.      Right lower leg: No edema.      Left lower leg: No edema.   Skin:     General: Skin is warm.      Capillary Refill: Capillary refill takes less than 2 seconds.      Findings: No rash.   Neurological:      General: No focal deficit present.      Mental Status: He is alert and oriented to person, place, and time.      Motor: No weakness.      Gait: Gait normal.   Psychiatric:         Mood and Affect: Mood normal.         Behavior: Behavior normal.

## 2024-09-11 NOTE — ASSESSMENT & PLAN NOTE
Stable  Continue Bevespi and albuterol as needed  Up-to-date with flu shot    Orders:    glycopyrrolate-formoterol (BEVESPI AEROSPHERE) 9-4.8 MCG/ACT inhaler; Inhale 2 puffs 2 (two) times a day    albuterol (PROVENTIL HFA,VENTOLIN HFA) 90 mcg/act inhaler; Inhale 2 puffs every 6 (six) hours as needed for wheezing

## 2024-09-11 NOTE — ASSESSMENT & PLAN NOTE
Orders:    furosemide (LASIX) 20 mg tablet; Take 1 tablet (20 mg total) by mouth every other day

## 2024-10-02 ENCOUNTER — APPOINTMENT (OUTPATIENT)
Dept: LAB | Facility: HOSPITAL | Age: 79
End: 2024-10-02
Payer: MEDICARE

## 2024-10-02 DIAGNOSIS — E55.9 VITAMIN D DEFICIENCY: ICD-10-CM

## 2024-10-02 DIAGNOSIS — I10 ESSENTIAL HYPERTENSION: ICD-10-CM

## 2024-10-02 DIAGNOSIS — E78.5 HYPERLIPIDEMIA, UNSPECIFIED HYPERLIPIDEMIA TYPE: ICD-10-CM

## 2024-10-02 DIAGNOSIS — R80.1 PERSISTENT PROTEINURIA: ICD-10-CM

## 2024-10-02 DIAGNOSIS — N18.32 STAGE 3B CHRONIC KIDNEY DISEASE (HCC): ICD-10-CM

## 2024-10-02 DIAGNOSIS — N18.31 CKD STAGE G3A/A2, GFR 45-59 AND ALBUMIN CREATININE RATIO 30-299 MG/G (HCC): ICD-10-CM

## 2024-10-02 LAB
25(OH)D3 SERPL-MCNC: >120 NG/ML (ref 30–100)
ALBUMIN SERPL BCG-MCNC: 4.5 G/DL (ref 3.5–5)
ALP SERPL-CCNC: 81 U/L (ref 34–104)
ALT SERPL W P-5'-P-CCNC: 15 U/L (ref 7–52)
ANION GAP SERPL CALCULATED.3IONS-SCNC: 7 MMOL/L (ref 4–13)
AST SERPL W P-5'-P-CCNC: 15 U/L (ref 13–39)
BASOPHILS # BLD AUTO: 0.07 THOUSANDS/ΜL (ref 0–0.1)
BASOPHILS NFR BLD AUTO: 1 % (ref 0–1)
BILIRUB SERPL-MCNC: 0.85 MG/DL (ref 0.2–1)
BUN SERPL-MCNC: 38 MG/DL (ref 5–25)
CALCIUM SERPL-MCNC: 10.2 MG/DL (ref 8.4–10.2)
CHLORIDE SERPL-SCNC: 102 MMOL/L (ref 96–108)
CHOLEST SERPL-MCNC: 101 MG/DL
CO2 SERPL-SCNC: 28 MMOL/L (ref 21–32)
CREAT SERPL-MCNC: 1.78 MG/DL (ref 0.6–1.3)
EOSINOPHIL # BLD AUTO: 0.15 THOUSAND/ΜL (ref 0–0.61)
EOSINOPHIL NFR BLD AUTO: 3 % (ref 0–6)
ERYTHROCYTE [DISTWIDTH] IN BLOOD BY AUTOMATED COUNT: 11.9 % (ref 11.6–15.1)
GFR SERPL CREATININE-BSD FRML MDRD: 35 ML/MIN/1.73SQ M
GLUCOSE P FAST SERPL-MCNC: 98 MG/DL (ref 65–99)
HCT VFR BLD AUTO: 43.2 % (ref 36.5–49.3)
HDLC SERPL-MCNC: 37 MG/DL
HGB BLD-MCNC: 14.5 G/DL (ref 12–17)
IMM GRANULOCYTES # BLD AUTO: 0.04 THOUSAND/UL (ref 0–0.2)
IMM GRANULOCYTES NFR BLD AUTO: 1 % (ref 0–2)
LDLC SERPL CALC-MCNC: 47 MG/DL (ref 0–100)
LYMPHOCYTES # BLD AUTO: 1.88 THOUSANDS/ΜL (ref 0.6–4.47)
LYMPHOCYTES NFR BLD AUTO: 34 % (ref 14–44)
MAGNESIUM SERPL-MCNC: 2.1 MG/DL (ref 1.9–2.7)
MCH RBC QN AUTO: 31 PG (ref 26.8–34.3)
MCHC RBC AUTO-ENTMCNC: 33.6 G/DL (ref 31.4–37.4)
MCV RBC AUTO: 93 FL (ref 82–98)
MONOCYTES # BLD AUTO: 0.7 THOUSAND/ΜL (ref 0.17–1.22)
MONOCYTES NFR BLD AUTO: 13 % (ref 4–12)
NEUTROPHILS # BLD AUTO: 2.68 THOUSANDS/ΜL (ref 1.85–7.62)
NEUTS SEG NFR BLD AUTO: 48 % (ref 43–75)
NONHDLC SERPL-MCNC: 64 MG/DL
NRBC BLD AUTO-RTO: 0 /100 WBCS
PHOSPHATE SERPL-MCNC: 2.4 MG/DL (ref 2.3–4.1)
PLATELET # BLD AUTO: 246 THOUSANDS/UL (ref 149–390)
PMV BLD AUTO: 8.9 FL (ref 8.9–12.7)
POTASSIUM SERPL-SCNC: 5.1 MMOL/L (ref 3.5–5.3)
PROT SERPL-MCNC: 7.3 G/DL (ref 6.4–8.4)
PTH-INTACT SERPL-MCNC: 30.3 PG/ML (ref 12–88)
RBC # BLD AUTO: 4.67 MILLION/UL (ref 3.88–5.62)
SODIUM SERPL-SCNC: 137 MMOL/L (ref 135–147)
TRIGL SERPL-MCNC: 86 MG/DL
WBC # BLD AUTO: 5.52 THOUSAND/UL (ref 4.31–10.16)

## 2024-10-02 PROCEDURE — 84100 ASSAY OF PHOSPHORUS: CPT

## 2024-10-02 PROCEDURE — 83735 ASSAY OF MAGNESIUM: CPT

## 2024-10-02 PROCEDURE — 36415 COLL VENOUS BLD VENIPUNCTURE: CPT

## 2024-10-02 PROCEDURE — 80061 LIPID PANEL: CPT

## 2024-10-02 PROCEDURE — 83970 ASSAY OF PARATHORMONE: CPT

## 2024-10-02 PROCEDURE — 85025 COMPLETE CBC W/AUTO DIFF WBC: CPT

## 2024-10-02 PROCEDURE — 80053 COMPREHEN METABOLIC PANEL: CPT

## 2024-10-02 PROCEDURE — 82306 VITAMIN D 25 HYDROXY: CPT

## 2024-10-03 ENCOUNTER — TELEPHONE (OUTPATIENT)
Dept: NEPHROLOGY | Facility: CLINIC | Age: 79
End: 2024-10-03

## 2024-10-03 NOTE — TELEPHONE ENCOUNTER
Left a VM to patient with the above message. Advised to please call our office to let us know he received the message and if have any other questions or concerns.   ----- Message from Alexsandra Lange PA-C sent at 10/3/2024 12:53 PM EDT -----  Vit D level elevated. Please ask him to d/c any vit D supplements.

## 2024-10-07 NOTE — TELEPHONE ENCOUNTER
This is the Second attempt.    Called patient to make sure he received the message and left a VM with the following information: Vit D level elevated. Please  d/c any vit D supplements. Advised to please call our office to let us know he received the message and if have any other questions or concerns.

## 2024-10-09 NOTE — TELEPHONE ENCOUNTER
This is 3rd attempt  letter been mailed.   Called patient to make sure he received the message and left a VM with the following information: Vit D level elevated. Please  d/c any vit D supplements. Advised to please call our office to let us know he received the message and if have any other questions or concerns.

## 2024-10-11 ENCOUNTER — TELEPHONE (OUTPATIENT)
Dept: NEPHROLOGY | Facility: CLINIC | Age: 79
End: 2024-10-11

## 2024-12-10 ENCOUNTER — PATIENT OUTREACH (OUTPATIENT)
Dept: OTHER | Facility: OTHER | Age: 79
End: 2024-12-10

## 2024-12-10 NOTE — PROGRESS NOTES
This Community Health Resource Nurse (CHRN) received text from client. Client inquired if CHRN would be at [facility] today during outreach hours. Client made aware that CHRN will be present tomorrow, Wednesday December 11 afternoon.

## 2024-12-11 ENCOUNTER — OFFICE VISIT (OUTPATIENT)
Dept: FAMILY MEDICINE CLINIC | Facility: CLINIC | Age: 79
End: 2024-12-11

## 2024-12-11 VITALS
HEIGHT: 68 IN | RESPIRATION RATE: 18 BRPM | DIASTOLIC BLOOD PRESSURE: 60 MMHG | SYSTOLIC BLOOD PRESSURE: 156 MMHG | TEMPERATURE: 98.3 F | BODY MASS INDEX: 28.46 KG/M2 | HEART RATE: 82 BPM | WEIGHT: 187.8 LBS | OXYGEN SATURATION: 96 %

## 2024-12-11 DIAGNOSIS — Z23 NEED FOR COVID-19 VACCINE: ICD-10-CM

## 2024-12-11 DIAGNOSIS — F17.211 NICOTINE DEPENDENCE, CIGARETTES, IN REMISSION: ICD-10-CM

## 2024-12-11 DIAGNOSIS — E78.5 HYPERLIPIDEMIA, UNSPECIFIED HYPERLIPIDEMIA TYPE: ICD-10-CM

## 2024-12-11 DIAGNOSIS — J41.8 MIXED SIMPLE AND MUCOPURULENT CHRONIC BRONCHITIS (HCC): ICD-10-CM

## 2024-12-11 DIAGNOSIS — I10 ESSENTIAL HYPERTENSION: Primary | ICD-10-CM

## 2024-12-11 DIAGNOSIS — N18.32 STAGE 3B CHRONIC KIDNEY DISEASE (HCC): ICD-10-CM

## 2024-12-11 DIAGNOSIS — Z12.2 SCREENING FOR LUNG CANCER: ICD-10-CM

## 2024-12-11 DIAGNOSIS — Z00.00 MEDICARE ANNUAL WELLNESS VISIT, SUBSEQUENT: ICD-10-CM

## 2024-12-11 PROCEDURE — 99214 OFFICE O/P EST MOD 30 MIN: CPT | Performed by: FAMILY MEDICINE

## 2024-12-11 PROCEDURE — 90480 ADMN SARSCOV2 VAC 1/ONLY CMP: CPT | Performed by: FAMILY MEDICINE

## 2024-12-11 PROCEDURE — 91320 SARSCV2 VAC 30MCG TRS-SUC IM: CPT | Performed by: FAMILY MEDICINE

## 2024-12-11 PROCEDURE — G0439 PPPS, SUBSEQ VISIT: HCPCS | Performed by: FAMILY MEDICINE

## 2024-12-11 RX ORDER — AMLODIPINE BESYLATE 5 MG/1
5 TABLET ORAL DAILY
Qty: 30 TABLET | Refills: 2 | Status: SHIPPED | OUTPATIENT
Start: 2024-12-11

## 2024-12-11 NOTE — ASSESSMENT & PLAN NOTE
I discussed with him that he is a candidate for lung cancer CT screening.     The following Shared Decision-Making points were covered:  Benefits of screening were discussed, including the rates of reduction in death from lung cancer and other causes.  Harms of screening were reviewed, including false positive tests, radiation exposure levels, risks of invasive procedures, risks of complications of screening, and risk of overdiagnosis.  I counseled on the importance of adherence to annual lung cancer LDCT screening, impact of co-morbidities, and ability or willingness to undergo diagnosis and treatment.  I counseled on the importance of maintaining abstinence as a former smoker or was counseled on the importance of smoking cessation if a current smoker    Review of Eligibility Criteria: He meets all of the criteria for Lung Cancer Screening.   He is 79 y.o.   He has 20 pack year tobacco history and is a current smoker or has quit within the past 15 years  He presents no signs or symptoms of lung cancer    After discussion, the patient decided to elect lung cancer screening.

## 2024-12-11 NOTE — ASSESSMENT & PLAN NOTE
Lab Results   Component Value Date    EGFR 35 10/02/2024    EGFR 34 03/18/2024    EGFR 38 10/30/2023    CREATININE 1.78 (H) 10/02/2024    CREATININE 1.86 (H) 03/18/2024    CREATININE 1.69 (H) 10/30/2023   Renal function appears stable  Avoid nephrotoxic agent  Follow-up with nephrology outpatient

## 2024-12-11 NOTE — ASSESSMENT & PLAN NOTE
Blood pressure remains elevated  Blood pressure goal less than 140/90  Continue lisinopril and Lasix  Will add amlodipine 5 mg daily  Recommend ambulatory blood pressure monitoring  Orders:    amLODIPine (NORVASC) 5 mg tablet; Take 1 tablet (5 mg total) by mouth daily     None

## 2024-12-11 NOTE — ASSESSMENT & PLAN NOTE
Stable  Continue albuterol as needed and Bevespi  He is up-to-date with COVID, pneumococcal and influenza vaccination

## 2024-12-11 NOTE — PROGRESS NOTES
Name: Edwin Valenzuela Jr.      : 1945      MRN: [id number]  Encounter Provider: Alonzo Dorado MD  Encounter Date: 2024   Encounter department: [facility]    Assessment & Plan  Essential hypertension  Blood pressure remains elevated  Blood pressure goal less than 140/90  Continue lisinopril and Lasix  Will add amlodipine 5 mg daily  Recommend ambulatory blood pressure monitoring  Orders:    amLODIPine (NORVASC) 5 mg tablet; Take 1 tablet (5 mg total) by mouth daily    Mixed simple and mucopurulent chronic bronchitis (HCC)  Stable  Continue albuterol as needed and Bevespi  He is up-to-date with COVID, pneumococcal and influenza vaccination       Stage 3b chronic kidney disease (HCC)  Lab Results   Component Value Date    EGFR 35 10/02/2024    EGFR 34 2024    EGFR 38 10/30/2023    CREATININE 1.78 (H) 10/02/2024    CREATININE 1.86 (H) 2024    CREATININE 1.69 (H) 10/30/2023   Renal function appears stable  Avoid nephrotoxic agent  Follow-up with nephrology outpatient         Hyperlipidemia, unspecified hyperlipidemia type  Continue statin       Need for COVID-19 vaccine    Orders:    COVID-19 Pfizer mRNA vaccine 12 yr and older (Comirnaty pre-filled syringe)    Nicotine dependence, cigarettes, in remission    Orders:    CT lung screening program; Future    Screening for lung cancer  I discussed with him that he is a candidate for lung cancer CT screening.     The following Shared Decision-Making points were covered:  Benefits of screening were discussed, including the rates of reduction in death from lung cancer and other causes.  Harms of screening were reviewed, including false positive tests, radiation exposure levels, risks of invasive procedures, risks of complications of screening, and risk of overdiagnosis.  I counseled on the importance of adherence to annual lung cancer LDCT screening, impact of co-morbidities, and ability or  willingness to undergo diagnosis and treatment.  I counseled on the importance of maintaining abstinence as a former smoker or was counseled on the importance of smoking cessation if a current smoker    Review of Eligibility Criteria: He meets all of the criteria for Lung Cancer Screening.   He is 79 y.o.   He has 20 pack year tobacco history and is a current smoker or has quit within the past 15 years  He presents no signs or symptoms of lung cancer    After discussion, the patient decided to elect lung cancer screening.         Medicare annual wellness visit, subsequent            Preventive health issues were discussed with patient, and age appropriate screening tests were ordered as noted in patient's After Visit Summary. Personalized health advice and appropriate referrals for health education or preventive services given if needed, as noted in patient's After Visit Summary.    History of Present Illness     79-year-old male with a history of hypertension, COPD and CKD who presents today for wellness visit.  He is doing well overall.  He is at his baseline state of health.       Patient Care Team:  Alonzo Dorado MD as PCP - General (Family Medicine)  Gabby Jara MD (Nephrology)  Lakeisha Arellano RN as CH Navigator    Review of Systems   Constitutional:  Negative for appetite change, chills, diaphoresis, fatigue and fever.   Eyes:  Negative for visual disturbance.   Respiratory:  Negative for cough, shortness of breath and wheezing.    Cardiovascular:  Negative for chest pain, palpitations and leg swelling.   Gastrointestinal:  Negative for abdominal pain, blood in stool, constipation, diarrhea, nausea and vomiting.   Endocrine: Negative for polydipsia, polyphagia and polyuria.   Genitourinary:  Negative for difficulty urinating, dysuria, frequency, hematuria and urgency.   Musculoskeletal:  Negative for arthralgias.   Skin:  Negative for rash.   Neurological:  Negative for dizziness,  tremors, weakness, light-headedness, numbness and headaches.   Psychiatric/Behavioral:  Negative for confusion.      Medical History Reviewed by provider this encounter:  Tobacco  Allergies  Meds  Problems  Med Hx  Surg Hx  Fam Hx       Annual Wellness Visit Questionnaire   Edwin is here for his Subsequent Wellness visit.     Health Risk Assessment:   Patient rates overall health as fair. Patient feels that their physical health rating is same. Patient is satisfied with their life. Eyesight was rated as same. Hearing was rated as slightly better. Patient feels that their emotional and mental health rating is same. Patients states they are never, rarely angry. Patient states they are never, rarely unusually tired/fatigued. Pain experienced in the last 7 days has been none. Patient states that he has experienced no weight loss or gain in last 6 months.     Fall Risk Screening:   In the past year, patient has experienced: no history of falling in past year      Home Safety:  Patient does not have trouble with stairs inside or outside of their home. Patient has working smoke alarms and has working carbon monoxide detector. Home safety hazards include: none.     Nutrition:   Current diet is Regular.     Medications:   Patient is not currently taking any over-the-counter supplements. Patient is able to manage medications.     Activities of Daily Living (ADLs)/Instrumental Activities of Daily Living (IADLs):   Walk and transfer into and out of bed and chair?: Yes  Dress and groom yourself?: Yes    Bathe or shower yourself?: Yes    Feed yourself? Yes  Do your laundry/housekeeping?: Yes  Manage your money, pay your bills and track your expenses?: Yes  Make your own meals?: Yes    Do your own shopping?: Yes    Previous Hospitalizations:   Any hospitalizations or ED visits within the last 12 months?: No      Advance Care Planning:   Living will: Yes    Durable POA for healthcare: No    Advanced directive: Yes    ACP  document given: Yes      Cognitive Screening:   Provider or family/friend/caregiver concerned regarding cognition?: No    PREVENTIVE SCREENINGS      Cardiovascular Screening:    General: Screening Not Indicated and History Lipid Disorder      Diabetes Screening:     General: Screening Current      Colorectal Cancer Screening:     General: Screening Current      Prostate Cancer Screening:    General: Screening Not Indicated      Abdominal Aortic Aneurysm (AAA) Screening:    Risk factors include: tobacco use        Lung Cancer Screening:     General: Screening Not Indicated      Hepatitis C Screening:    General: Screening Current    Screening, Brief Intervention, and Referral to Treatment (SBIRT)    Screening      Single Item Drug Screening:  How often have you used an illegal drug (including marijuana) or a prescription medication for non-medical reasons in the past year? never    Single Item Drug Screen Score: 0  Interpretation: Negative screen for possible drug use disorder    Other Counseling Topics:   Regular weightbearing exercise.     Social Drivers of Health     Financial Resource Strain: Low Risk  (1/30/2024)    Overall Financial Resource Strain (CARDIA)     Difficulty of Paying Living Expenses: Not hard at all   Food Insecurity: No Food Insecurity (1/30/2024)    Nursing - Inadequate Food Risk Classification     Worried About Running Out of Food in the Last Year: Never true     Ran Out of Food in the Last Year: Never true   Transportation Needs: No Transportation Needs (1/30/2024)    PRAPARE - Transportation     Lack of Transportation (Medical): No     Lack of Transportation (Non-Medical): No   Housing Stability: Low Risk  (5/30/2024)    Housing Stability Vital Sign     Unable to Pay for Housing in the Last Year: No     Number of Times Moved in the Last Year: 1     Homeless in the Last Year: No   Utilities: Not At Risk (5/30/2024)    [facility] Utilities     Threatened with loss of utilities: No     No results  "found.    Objective   /60 (BP Location: Left arm, Patient Position: Sitting, Cuff Size: Standard) Comment: Pt takes bp meds at night  Pulse 82   Temp 98.3 °F (36.8 °C) (Temporal)   Resp 18   Ht 5' 8\" (1.727 m)   Wt 85.2 kg (187 lb 12.8 oz)   SpO2 96%   BMI 28.55 kg/m²     Physical Exam  Vitals reviewed.   Constitutional:       General: He is not in acute distress.     Appearance: Normal appearance. He is well-developed. He is not ill-appearing, toxic-appearing or diaphoretic.   HENT:      Head: Normocephalic and atraumatic.      Right Ear: Tympanic membrane and external ear normal.      Left Ear: Tympanic membrane and external ear normal.      Nose: Nose normal.      Mouth/Throat:      Mouth: Mucous membranes are moist.      Pharynx: No oropharyngeal exudate or posterior oropharyngeal erythema.   Eyes:      General: No scleral icterus.        Right eye: No discharge.         Left eye: No discharge.      Extraocular Movements: Extraocular movements intact.      Conjunctiva/sclera: Conjunctivae normal.   Cardiovascular:      Rate and Rhythm: Normal rate and regular rhythm.      Heart sounds: Normal heart sounds. No murmur heard.     No friction rub. No gallop.   Pulmonary:      Effort: Pulmonary effort is normal. No respiratory distress.      Breath sounds: Normal breath sounds. No stridor. No wheezing or rhonchi.   Abdominal:      General: Bowel sounds are normal. There is no distension.      Palpations: Abdomen is soft. There is no mass.      Tenderness: There is no abdominal tenderness. There is no guarding.   Musculoskeletal:         General: No swelling or tenderness. Normal range of motion.      Cervical back: Normal range of motion.      Right lower leg: No edema.      Left lower leg: No edema.   Skin:     General: Skin is warm.      Capillary Refill: Capillary refill takes less than 2 seconds.      Findings: No rash.   Neurological:      General: No focal deficit present.      Mental Status: He is " alert and oriented to person, place, and time.      Motor: No weakness.      Gait: Gait normal.   Psychiatric:         Mood and Affect: Mood normal.         Behavior: Behavior normal.

## 2024-12-17 ENCOUNTER — VBI (OUTPATIENT)
Dept: ADMINISTRATIVE | Facility: OTHER | Age: 79
End: 2024-12-17

## 2024-12-17 NOTE — TELEPHONE ENCOUNTER
12/17/24 10:07 AM     Chart reviewed for Blood Pressure was/were not submitted to the patient's insurance.     Pop Marcus MA   [facility]

## 2025-01-27 ENCOUNTER — TELEPHONE (OUTPATIENT)
Age: 80
End: 2025-01-27

## 2025-01-27 NOTE — TELEPHONE ENCOUNTER
Pt called back.  He states he received a msg to cancel his appt 2/3.  I asked if he got a the confirmation. He said no someone wants to cancel him. Please call the pt.    No

## 2025-01-27 NOTE — TELEPHONE ENCOUNTER
No one in the office has called to cancel or reschedule this appt. I believe he may be confused because of a confirmation call/text from [facility]. Called pt and LVM stating he is still scheduled on 2/3/25 at 11am with Dr Jara in the AO.

## 2025-01-29 DIAGNOSIS — N18.31 CKD STAGE G3A/A2, GFR 45-59 AND ALBUMIN CREATININE RATIO 30-299 MG/G (HCC): Primary | ICD-10-CM

## 2025-01-29 DIAGNOSIS — N18.32 STAGE 3B CHRONIC KIDNEY DISEASE (HCC): ICD-10-CM

## 2025-01-30 ENCOUNTER — TELEPHONE (OUTPATIENT)
Dept: NEPHROLOGY | Facility: HOSPITAL | Age: 80
End: 2025-01-30

## 2025-02-04 DIAGNOSIS — I10 ESSENTIAL HYPERTENSION: ICD-10-CM

## 2025-02-05 RX ORDER — AMLODIPINE BESYLATE 5 MG/1
5 TABLET ORAL DAILY
Qty: 30 TABLET | Refills: 2 | Status: SHIPPED | OUTPATIENT
Start: 2025-02-05

## 2025-03-12 ENCOUNTER — OFFICE VISIT (OUTPATIENT)
Dept: FAMILY MEDICINE CLINIC | Facility: CLINIC | Age: 80
End: 2025-03-12

## 2025-03-12 VITALS
OXYGEN SATURATION: 95 % | SYSTOLIC BLOOD PRESSURE: 130 MMHG | HEIGHT: 68 IN | DIASTOLIC BLOOD PRESSURE: 60 MMHG | WEIGHT: 184 LBS | TEMPERATURE: 98 F | BODY MASS INDEX: 27.89 KG/M2 | RESPIRATION RATE: 16 BRPM | HEART RATE: 85 BPM

## 2025-03-12 DIAGNOSIS — J41.8 MIXED SIMPLE AND MUCOPURULENT CHRONIC BRONCHITIS (HCC): Primary | ICD-10-CM

## 2025-03-12 DIAGNOSIS — I10 ESSENTIAL HYPERTENSION: ICD-10-CM

## 2025-03-12 DIAGNOSIS — N18.32 STAGE 3B CHRONIC KIDNEY DISEASE (HCC): ICD-10-CM

## 2025-03-12 DIAGNOSIS — K80.20 CALCULUS OF GALLBLADDER WITHOUT CHOLECYSTITIS WITHOUT OBSTRUCTION: ICD-10-CM

## 2025-03-12 DIAGNOSIS — H91.13 PRESBYCUSIS OF BOTH EARS: ICD-10-CM

## 2025-03-12 PROCEDURE — G2211 COMPLEX E/M VISIT ADD ON: HCPCS | Performed by: FAMILY MEDICINE

## 2025-03-12 PROCEDURE — 99214 OFFICE O/P EST MOD 30 MIN: CPT | Performed by: FAMILY MEDICINE

## 2025-03-12 RX ORDER — AMLODIPINE BESYLATE 5 MG/1
5 TABLET ORAL DAILY
Qty: 90 TABLET | Refills: 2 | Status: SHIPPED | OUTPATIENT
Start: 2025-03-12

## 2025-03-12 NOTE — ASSESSMENT & PLAN NOTE
Stable  At goal of less than 140/90  Continue Lasix, lisinopril and amlodipine    Orders:    amLODIPine (NORVASC) 5 mg tablet; Take 1 tablet (5 mg total) by mouth daily

## 2025-03-12 NOTE — ASSESSMENT & PLAN NOTE
Lab Results   Component Value Date    EGFR 35 10/02/2024    EGFR 34 03/18/2024    EGFR 38 10/30/2023    CREATININE 1.78 (H) 10/02/2024    CREATININE 1.86 (H) 03/18/2024    CREATININE 1.69 (H) 10/30/2023   Creatinine is stable  Avoid nephrotoxic agent  Optimize blood pressure control  Follow-up with nephrology outpatient    Orders:    Comprehensive metabolic panel; Future    CBC and differential; Future

## 2025-03-12 NOTE — PROGRESS NOTES
Name: Edwin Valenzuela Jr.      : 1945      MRN: [id number]  Encounter Provider: Alonzo Dorado MD  Encounter Date: 3/12/2025   Encounter department: [facility]  :  Assessment & Plan  Mixed simple and mucopurulent chronic bronchitis (HCC)  Stable  Continue albuterol and Bevespi     Essential hypertension  Stable  At goal of less than 140/90  Continue Lasix, lisinopril and amlodipine    Orders:    amLODIPine (NORVASC) 5 mg tablet; Take 1 tablet (5 mg total) by mouth daily    Calculus of gallbladder without cholecystitis without obstruction  Asymptomatic  Continue to monitor       Presbycusis of both ears  Patient is hard of hearing  He will obtain repair of his hearing aids very soon       Stage 3b chronic kidney disease (HCC)  Lab Results   Component Value Date    EGFR 35 10/02/2024    EGFR 34 2024    EGFR 38 10/30/2023    CREATININE 1.78 (H) 10/02/2024    CREATININE 1.86 (H) 2024    CREATININE 1.69 (H) 10/30/2023   Creatinine is stable  Avoid nephrotoxic agent  Optimize blood pressure control  Follow-up with nephrology outpatient    Orders:    Comprehensive metabolic panel; Future    CBC and differential; Future           History of Present Illness   79-year-old male with a history of COPD, CKD, and hypertension who presents today for follow-up.  He is doing well overall.  He is at his baseline state of health.  He finally is going to have his hearing aid repaired.  He is very excited about this.  He is very hard of hearing.      Review of Systems   Constitutional:  Negative for appetite change, chills, diaphoresis, fatigue and fever.   HENT:  Positive for hearing loss.    Eyes:  Negative for visual disturbance.   Respiratory:  Negative for cough, shortness of breath and wheezing.    Cardiovascular:  Negative for chest pain, palpitations and leg swelling.   Gastrointestinal:  Negative for abdominal pain, blood in stool, constipation, diarrhea,  "nausea and vomiting.   Endocrine: Negative for polydipsia, polyphagia and polyuria.   Genitourinary:  Negative for difficulty urinating, dysuria, frequency, hematuria and urgency.   Musculoskeletal:  Negative for arthralgias.   Skin:  Negative for rash.   Neurological:  Negative for dizziness, tremors, weakness, light-headedness, numbness and headaches.   Psychiatric/Behavioral:  Negative for confusion.        Objective   /60 (BP Location: Left arm, Patient Position: Sitting, Cuff Size: Large)   Pulse 85   Temp 98 °F (36.7 °C) (Temporal)   Resp 16   Ht 5' 8\" (1.727 m)   Wt 83.5 kg (184 lb)   SpO2 95%   BMI 27.98 kg/m²      Physical Exam  Vitals reviewed.   Constitutional:       General: He is not in acute distress.     Appearance: Normal appearance. He is well-developed. He is not ill-appearing, toxic-appearing or diaphoretic.   HENT:      Head: Normocephalic and atraumatic.      Right Ear: Tympanic membrane and external ear normal.      Left Ear: Tympanic membrane and external ear normal.      Nose: Nose normal.      Mouth/Throat:      Mouth: Mucous membranes are moist.      Pharynx: No oropharyngeal exudate or posterior oropharyngeal erythema.   Eyes:      General: No scleral icterus.        Right eye: No discharge.         Left eye: No discharge.      Extraocular Movements: Extraocular movements intact.      Conjunctiva/sclera: Conjunctivae normal.   Cardiovascular:      Rate and Rhythm: Normal rate and regular rhythm.      Heart sounds: Normal heart sounds. No murmur heard.     No friction rub. No gallop.   Pulmonary:      Effort: Pulmonary effort is normal. No respiratory distress.      Breath sounds: Normal breath sounds. No stridor. No wheezing or rhonchi.   Abdominal:      General: Bowel sounds are normal. There is no distension.      Palpations: Abdomen is soft. There is no mass.      Tenderness: There is no abdominal tenderness. There is no guarding.   Musculoskeletal:         General: Normal " range of motion.      Cervical back: Normal range of motion.      Right lower leg: No edema.      Left lower leg: No edema.   Skin:     General: Skin is warm.      Capillary Refill: Capillary refill takes less than 2 seconds.      Findings: No rash.   Neurological:      General: No focal deficit present.      Mental Status: He is alert and oriented to person, place, and time.      Gait: Gait normal.   Psychiatric:         Mood and Affect: Mood normal.         Behavior: Behavior normal.

## 2025-03-19 ENCOUNTER — APPOINTMENT (OUTPATIENT)
Dept: LAB | Facility: HOSPITAL | Age: 80
End: 2025-03-19
Payer: MEDICARE

## 2025-03-19 ENCOUNTER — RESULTS FOLLOW-UP (OUTPATIENT)
Dept: OTHER | Facility: HOSPITAL | Age: 80
End: 2025-03-19

## 2025-03-19 DIAGNOSIS — N18.31 CKD STAGE G3A/A2, GFR 45-59 AND ALBUMIN CREATININE RATIO 30-299 MG/G (HCC): ICD-10-CM

## 2025-03-19 DIAGNOSIS — N18.32 STAGE 3B CHRONIC KIDNEY DISEASE (HCC): ICD-10-CM

## 2025-03-19 LAB
ALBUMIN SERPL BCG-MCNC: 4.5 G/DL (ref 3.5–5)
ALP SERPL-CCNC: 93 U/L (ref 34–104)
ALT SERPL W P-5'-P-CCNC: 17 U/L (ref 7–52)
ANION GAP SERPL CALCULATED.3IONS-SCNC: 8 MMOL/L (ref 4–13)
AST SERPL W P-5'-P-CCNC: 15 U/L (ref 13–39)
BASOPHILS # BLD AUTO: 0.05 THOUSANDS/ÂΜL (ref 0–0.1)
BASOPHILS NFR BLD AUTO: 1 % (ref 0–1)
BILIRUB SERPL-MCNC: 0.64 MG/DL (ref 0.2–1)
BUN SERPL-MCNC: 25 MG/DL (ref 5–25)
CALCIUM SERPL-MCNC: 9.5 MG/DL (ref 8.4–10.2)
CHLORIDE SERPL-SCNC: 104 MMOL/L (ref 96–108)
CO2 SERPL-SCNC: 26 MMOL/L (ref 21–32)
CREAT SERPL-MCNC: 1.34 MG/DL (ref 0.6–1.3)
EOSINOPHIL # BLD AUTO: 0.08 THOUSAND/ÂΜL (ref 0–0.61)
EOSINOPHIL NFR BLD AUTO: 1 % (ref 0–6)
ERYTHROCYTE [DISTWIDTH] IN BLOOD BY AUTOMATED COUNT: 11.5 % (ref 11.6–15.1)
GFR SERPL CREATININE-BSD FRML MDRD: 50 ML/MIN/1.73SQ M
GLUCOSE P FAST SERPL-MCNC: 106 MG/DL (ref 65–99)
HCT VFR BLD AUTO: 45.6 % (ref 36.5–49.3)
HGB BLD-MCNC: 14.9 G/DL (ref 12–17)
IMM GRANULOCYTES # BLD AUTO: 0.07 THOUSAND/UL (ref 0–0.2)
IMM GRANULOCYTES NFR BLD AUTO: 1 % (ref 0–2)
LYMPHOCYTES # BLD AUTO: 1.73 THOUSANDS/ÂΜL (ref 0.6–4.47)
LYMPHOCYTES NFR BLD AUTO: 31 % (ref 14–44)
MCH RBC QN AUTO: 30 PG (ref 26.8–34.3)
MCHC RBC AUTO-ENTMCNC: 32.7 G/DL (ref 31.4–37.4)
MCV RBC AUTO: 92 FL (ref 82–98)
MONOCYTES # BLD AUTO: 0.68 THOUSAND/ÂΜL (ref 0.17–1.22)
MONOCYTES NFR BLD AUTO: 12 % (ref 4–12)
NEUTROPHILS # BLD AUTO: 3.06 THOUSANDS/ÂΜL (ref 1.85–7.62)
NEUTS SEG NFR BLD AUTO: 54 % (ref 43–75)
NRBC BLD AUTO-RTO: 0 /100 WBCS
PLATELET # BLD AUTO: 242 THOUSANDS/UL (ref 149–390)
PMV BLD AUTO: 9 FL (ref 8.9–12.7)
POTASSIUM SERPL-SCNC: 4.3 MMOL/L (ref 3.5–5.3)
PROT SERPL-MCNC: 7 G/DL (ref 6.4–8.4)
RBC # BLD AUTO: 4.97 MILLION/UL (ref 3.88–5.62)
SODIUM SERPL-SCNC: 138 MMOL/L (ref 135–147)
WBC # BLD AUTO: 5.67 THOUSAND/UL (ref 4.31–10.16)

## 2025-03-19 PROCEDURE — 80053 COMPREHEN METABOLIC PANEL: CPT

## 2025-03-19 PROCEDURE — 85025 COMPLETE CBC W/AUTO DIFF WBC: CPT

## 2025-03-19 PROCEDURE — 36415 COLL VENOUS BLD VENIPUNCTURE: CPT

## 2025-04-17 ENCOUNTER — TELEPHONE (OUTPATIENT)
Dept: FAMILY MEDICINE CLINIC | Facility: CLINIC | Age: 80
End: 2025-04-17

## 2025-04-17 DIAGNOSIS — I10 ESSENTIAL HYPERTENSION: ICD-10-CM

## 2025-04-18 RX ORDER — ROSUVASTATIN CALCIUM 10 MG/1
10 TABLET, COATED ORAL DAILY
Qty: 90 TABLET | Refills: 2 | Status: SHIPPED | OUTPATIENT
Start: 2025-04-18

## 2025-05-01 ENCOUNTER — OFFICE VISIT (OUTPATIENT)
Dept: NEPHROLOGY | Facility: CLINIC | Age: 80
End: 2025-05-01
Payer: MEDICARE

## 2025-05-01 VITALS
DIASTOLIC BLOOD PRESSURE: 60 MMHG | SYSTOLIC BLOOD PRESSURE: 128 MMHG | BODY MASS INDEX: 27.89 KG/M2 | WEIGHT: 184 LBS | HEIGHT: 68 IN

## 2025-05-01 DIAGNOSIS — E83.9 CHRONIC KIDNEY DISEASE-MINERAL AND BONE DISORDER (CKD-MBD): ICD-10-CM

## 2025-05-01 DIAGNOSIS — I12.9 HYPERTENSIVE KIDNEY DISEASE WITH STAGE 3A CHRONIC KIDNEY DISEASE (HCC): Primary | ICD-10-CM

## 2025-05-01 DIAGNOSIS — N18.9 CHRONIC KIDNEY DISEASE-MINERAL AND BONE DISORDER (CKD-MBD): ICD-10-CM

## 2025-05-01 DIAGNOSIS — N18.31 HYPERTENSIVE KIDNEY DISEASE WITH STAGE 3A CHRONIC KIDNEY DISEASE (HCC): Primary | ICD-10-CM

## 2025-05-01 DIAGNOSIS — R80.1 PERSISTENT PROTEINURIA: ICD-10-CM

## 2025-05-01 DIAGNOSIS — E78.2 MIXED HYPERLIPIDEMIA: ICD-10-CM

## 2025-05-01 DIAGNOSIS — M89.9 CHRONIC KIDNEY DISEASE-MINERAL AND BONE DISORDER (CKD-MBD): ICD-10-CM

## 2025-05-01 PROBLEM — E55.9 VITAMIN D DEFICIENCY: Status: RESOLVED | Noted: 2020-09-02 | Resolved: 2025-05-01

## 2025-05-01 PROCEDURE — 99214 OFFICE O/P EST MOD 30 MIN: CPT | Performed by: INTERNAL MEDICINE

## 2025-05-01 NOTE — ASSESSMENT & PLAN NOTE
Lab Results   Component Value Date    EGFR 50 03/19/2025    EGFR 35 10/02/2024    EGFR 34 03/18/2024    CREATININE 1.34 (H) 03/19/2025    CREATININE 1.78 (H) 10/02/2024    CREATININE 1.86 (H) 03/18/2024     Based on patients CKD stage following is the goal of therapy.  Maintain calcium phosphorus product of < 55.  Stage 3 CKD - Goal Ca 8.5-10 mg/dL , goal Phos 2.7-4.6 mg/dL  , goal iPTH 30-70 pg/m  Currently on: Vitamin D 5000 units daily  Changes made today: Hold all vitamin D for now  most recent ipth 30.3 and vit D greater than 120 in October 2024  Check iPTH vitamin D prior to next visit and in 6 months

## 2025-05-01 NOTE — ASSESSMENT & PLAN NOTE
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

## 2025-05-01 NOTE — ASSESSMENT & PLAN NOTE
Lab Results   Component Value Date    EGFR 50 03/19/2025    EGFR 35 10/02/2024    EGFR 34 03/18/2024    CREATININE 1.34 (H) 03/19/2025    CREATININE 1.78 (H) 10/02/2024    CREATININE 1.86 (H) 03/18/2024     BP Readings from Last 3 Encounters:   05/01/25 128/60   03/12/25 130/60   12/11/24 156/60   Current medications: Amlodipine 5 mg p.o. daily, lisinopril 40 mg p.o. nightly, lasix 20mg po Q48  Changes made today: Blood pressures volume status stable no medication changes for today.  Discussed with patient appropriate timings of checking blood pressures  Goal BP of < 130/80 based on age and comorbidities  Instructed to follow low sodium (2gm)diet.  Advised to hold ACEI/ARBs if patient suffers from dehydration due to gastrointestinal losses due to risk of LILLY secondary to failure to autoregulate.  after review of records In [facility] as well as Care everywhere patient had a baseline creatinine of 1.2-1.4 mg/dL however since December 2022 has been around 1.5 to 1.9 mg/dL.   Most recent labs show a Creatinine of 1.34 mg/dL on 3/19/25. Renal function remains stable and slightly below baseline.  Blood work in 6 months and then again prior to next visit  Likely has underlying CKD due to potential nephrosclerosis plus age-related nephron loss plus prior episode acute kidney injury nondialysis requiring  Imaging:   Renal ultrasound 6/24/2021 bilateral kidneys 10 cm stable right renal cyst prostatomegaly  Recommend to avoid use of NSAIDs, nephrotoxins. Caution advised with regards to exposure to IV contrast dye.   Discussed with the patient in depth his renal status, including the possible etiologies for CKD.   Advised the patient that when his GFR is close to 20mL/min then will start discussing about RRT(renal replacement therapy) options such as renal transplant, peritoneal dialysis and hemodialysis.   Informed the patient about the various options for Renal Replacement therapy.  Discussed with the patient how we need to work  together to delay the progression of CKD with optimal BP control based on their age and co-morbidities, optimal BS control with HbA1c of <7% and trying to reduce proteinuria by the use of anti-proteinuric agents.   CKD education/Kidney smart: Referral placed 3/6/2024  Follow-up: Patient is to follow-up in 12 months, with lab work to be performed in 6 months and then again in a few days prior to the next visit. Advised patient to call me in 10 days to review the results if they do not hear back from me, as I may have not received the results.    Orders:  •  PTH, intact; Future  •  Renal function panel; Future  •  Vitamin D 25 hydroxy; Future  •  Vitamin D 25 hydroxy; Future  •  Renal function panel; Future  •  PTH, intact; Future  •  Albumin / creatinine urine ratio; Future  •  Phosphorus; Future

## 2025-05-01 NOTE — PROGRESS NOTES
Name: Edwin Valenzuela Jr.      : 1945      MRN: [id number]  Encounter Provider: Gabby Jara MD  Encounter Date: 2025   Encounter department: [facility]  :78 y.o.  male with pmh of multiple co-morbidities including  hypertension  (x 5yrs+), COPD, hyperlipidemia and CKD stage 2/3 presents to the office for routine follow-up.      Assessment & Plan  Hypertensive kidney disease with stage 3a chronic kidney disease (HCC)  Lab Results   Component Value Date    EGFR 50 2025    EGFR 35 10/02/2024    EGFR 34 2024    CREATININE 1.34 (H) 2025    CREATININE 1.78 (H) 10/02/2024    CREATININE 1.86 (H) 2024     BP Readings from Last 3 Encounters:   25 128/60   25 130/60   24 156/60   Current medications: Amlodipine 5 mg p.o. daily, lisinopril 40 mg p.o. nightly, lasix 20mg po Q48  Changes made today: Blood pressures volume status stable no medication changes for today.  Discussed with patient appropriate timings of checking blood pressures  Goal BP of < 130/80 based on age and comorbidities  Instructed to follow low sodium (2gm)diet.  Advised to hold ACEI/ARBs if patient suffers from dehydration due to gastrointestinal losses due to risk of LILLY secondary to failure to autoregulate.  after review of records In [facility] as well as Care everywhere patient had a baseline creatinine of 1.2-1.4 mg/dL however since 2022 has been around 1.5 to 1.9 mg/dL.   Most recent labs show a Creatinine of 1.34 mg/dL on 3/19/25. Renal function remains stable and slightly below baseline.  Blood work in 6 months and then again prior to next visit  Likely has underlying CKD due to potential nephrosclerosis plus age-related nephron loss plus prior episode acute kidney injury nondialysis requiring  Imaging:   Renal ultrasound 2021 bilateral kidneys 10 cm stable right renal cyst prostatomegaly  Recommend to avoid use of NSAIDs, nephrotoxins. Caution  advised with regards to exposure to IV contrast dye.   Discussed with the patient in depth his renal status, including the possible etiologies for CKD.   Advised the patient that when his GFR is close to 20mL/min then will start discussing about RRT(renal replacement therapy) options such as renal transplant, peritoneal dialysis and hemodialysis.   Informed the patient about the various options for Renal Replacement therapy.  Discussed with the patient how we need to work together to delay the progression of CKD with optimal BP control based on their age and co-morbidities, optimal BS control with HbA1c of <7% and trying to reduce proteinuria by the use of anti-proteinuric agents.   CKD education/Kidney smart: Referral placed 3/6/2024  Follow-up: Patient is to follow-up in 12 months, with lab work to be performed in 6 months and then again in a few days prior to the next visit. Advised patient to call me in 10 days to review the results if they do not hear back from me, as I may have not received the results.    Orders:  •  PTH, intact; Future  •  Renal function panel; Future  •  Vitamin D 25 hydroxy; Future  •  Vitamin D 25 hydroxy; Future  •  Renal function panel; Future  •  PTH, intact; Future  •  Albumin / creatinine urine ratio; Future  •  Phosphorus; Future    Chronic kidney disease-mineral and bone disorder (CKD-MBD)  Lab Results   Component Value Date    EGFR 50 03/19/2025    EGFR 35 10/02/2024    EGFR 34 03/18/2024    CREATININE 1.34 (H) 03/19/2025    CREATININE 1.78 (H) 10/02/2024    CREATININE 1.86 (H) 03/18/2024     Based on patients CKD stage following is the goal of therapy.  Maintain calcium phosphorus product of < 55.  Stage 3 CKD - Goal Ca 8.5-10 mg/dL , goal Phos 2.7-4.6 mg/dL  , goal iPTH 30-70 pg/m  Currently on: Vitamin D 5000 units daily  Changes made today: Hold all vitamin D for now  most recent ipth 30.3 and vit D greater than 120 in October 2024  Check iPTH vitamin D prior to next visit and  "in 6 months    Persistent proteinuria  likely has underlying proteinuria due to hypertensive nephrosclerosis nephrosclerosis  most recent MACR is 8 mg/dL from March 2024  Recheck MACR prior to next visit  For proteinuria reduction continue on lisinopril vitamin D Crestor  Mixed hyperlipidemia  Goal LDL less than 70  Continue on Crestor  Orders:  •  PTH, intact; Future  •  Renal function panel; Future  •  Vitamin D 25 hydroxy; Future  •  Vitamin D 25 hydroxy; Future  •  Renal function panel; Future  •  PTH, intact; Future  •  Albumin / creatinine urine ratio; Future  •  Phosphorus; Future        History of Present Illness   HPI  Edwin Valenzuela Jr. is a 79 y.o. male who presents for routine follow-up feels well has no complaints no recent hospitalization for some reason vitamin D and PTH hormone level were not redrawn.  He remains on vitamin D advised patient to hold it for now he agrees no NSAID use thankful for the care information that is gone today agrees with current plan has been checking his blood pressures at home and they have been good.  Discussed techniques and timings of blood pressure checks      Review of Systems   Constitutional:  Negative for chills and fever.   HENT:  Negative for congestion.    Respiratory:  Negative for cough, shortness of breath and wheezing.    Cardiovascular:  Negative for leg swelling.   Gastrointestinal:  Negative for constipation.   Genitourinary:  Negative for dysuria and hematuria.   Musculoskeletal:  Negative for back pain.   Neurological:  Negative for dizziness and headaches.   Psychiatric/Behavioral:  Negative for agitation and confusion.    All other systems reviewed and are negative.         Objective   /60 (BP Location: Left arm, Patient Position: Sitting, Cuff Size: Large)   Ht 5' 8\" (1.727 m)   Wt 83.5 kg (184 lb)   BMI 27.98 kg/m²      Physical Exam  Vitals reviewed.   Constitutional:       General: He is not in acute distress.     Appearance: Normal " appearance. He is normal weight. He is not ill-appearing, toxic-appearing or diaphoretic.   HENT:      Head: Normocephalic and atraumatic.      Mouth/Throat:      Mouth: Mucous membranes are moist.      Pharynx: No oropharyngeal exudate.   Eyes:      General: No scleral icterus.  Cardiovascular:      Rate and Rhythm: Normal rate.   Pulmonary:      Effort: No respiratory distress.      Breath sounds: No stridor. No wheezing.   Abdominal:      General: There is no distension.      Palpations: There is no mass.      Tenderness: There is no right CVA tenderness or left CVA tenderness.   Musculoskeletal:         General: No swelling.      Cervical back: Normal range of motion. No rigidity.   Skin:     Coloration: Skin is not jaundiced.   Neurological:      General: No focal deficit present.      Mental Status: He is alert and oriented to person, place, and time. Mental status is at baseline.   Psychiatric:         Mood and Affect: Mood normal.         Behavior: Behavior normal.

## 2025-05-01 NOTE — ASSESSMENT & PLAN NOTE
Goal LDL less than 70  Continue on Crestor  Orders:  •  PTH, intact; Future  •  Renal function panel; Future  •  Vitamin D 25 hydroxy; Future  •  Vitamin D 25 hydroxy; Future  •  Renal function panel; Future  •  PTH, intact; Future  •  Albumin / creatinine urine ratio; Future  •  Phosphorus; Future

## 2025-05-01 NOTE — PATIENT INSTRUCTIONS
"Hold vitamin D for now  Get blood work in 6months    Please call me in 10 days after having your blood work done to review the results if you do not hear back from me or my office, as I may have not received the results.  please remember to perform blood work prior to the next visit.  Please call if the blood pressure top number is greater than 140 or less than 110 consistently.  Please call if you are gaining more than 2lbs in 2 days for adjustment of water pills.  ~ Please AVOID the following pain medications.  LIST OF NSAIDS (NONSTEROIDAL ANTI-INFLAMMATORY DRUGS) AND CHRISTIANSON-2 INHIBITORS    DIFLUNISAL (DOLOBID)  IBUPROFEN (MOTRIN, ADVIL)  FLURBIPROFEN (ANSAID)  KETOPROFEN (ORUDIS, ORUVAIL)  FENOPROFEN (NALFON)  NABUMETONE (RELAFEN)  PIROXICAM (FELDENE)  NAPROXEN (ALEVE, NAPROSYN, NAPRELAN, ANAPROX)  DICLOFENAC (VOLTAREN, CATAFLAM)  INDOMETHACIN (INDOCIN)  SULINDAC (CLINORIL)  TOLMETIN (TOLETIN)  ETODOLAC (LODINE)  MELOXICAM (MOBIC)  KETOROLAC (TORADOL)  OXAPROZIN (DAYPRO)  CELECOXIB (CELEBREX)Phosphorus diet  Follow a low phosphorus diet.    Avoid these higher phosphorus foods: Choose these lower phosphorus foods:   Milk, pudding or yogurt (from animals and from many soy varieties) Rice milk (unfortified), nondairy creamer (if it doesn't have terms in the ingredients list that contain the letters \"phos\")   Hard cheeses, ricotta or cottage cheese, fat-free cream cheese Regular and low-fat cream cheese   Ice cream or frozen yogurt Sherbet or frozen fruit pops   Soups made with higher phosphorus ingredients (milk, dried peas, beans, lentils) Soups made with lower phosphorus ingredients (broth- or water-based with other lower phosphorus ingredients)   Whole grains, including whole-grain breads, crackers, cereal, rice and pasta Refined grains, including white bread, crackers, cereals, rice and pasta   Quick breads, biscuits, cornbread, muffins, pancakes or waffles Homemade refined (white) dinner rolls, bagels or English " "muffins   Dried peas (split, black-eyed), beans (black, garbanzo, lima, kidney, navy, garcia) or lentils Green peas (canned, frozen), green beans or wax beans   Organ meats, walleye, pollock or sardines Lean beef, pork, lamb, poultry or other fish   Nuts and seeds Popcorn   Peanut butter and other nut butters Jam, jelly or honey   Chocolate, including chocolate drinks Carob (chocolate-flavored) candy, hard candy or gumdrops   Radha and pepper-type sodas, flavored mascorro, bottled teas (if a term in the ingredients list contains the letters \"phos\") Lemon-lime soda, ginger ale or root beer, plain water   Things to do to reduce your blood pressure include working with all your physician to do the following:  stop smoking if you smoke.  increase cardiovascular exercise like walking and swimming.   modify your diet to decrease fat and salt intake.  reduce your weight if you are overweight or obese.  increase the consumption of fruits, vegetables and whole grains.  decrease alcohol consumption if you consume alcohol.   try to minimize stress in your life with lifestyle modifications.   be compliant with your anti-hypertensive medications.   adjust your medications to help improve your vascular stiffness and decrease risks for heart attacks and strokes. Follow a moderate potassium diet.        "

## 2025-06-12 ENCOUNTER — OFFICE VISIT (OUTPATIENT)
Dept: FAMILY MEDICINE CLINIC | Facility: CLINIC | Age: 80
End: 2025-06-12

## 2025-06-12 VITALS
TEMPERATURE: 98 F | SYSTOLIC BLOOD PRESSURE: 132 MMHG | OXYGEN SATURATION: 97 % | HEART RATE: 87 BPM | BODY MASS INDEX: 29.1 KG/M2 | HEIGHT: 68 IN | DIASTOLIC BLOOD PRESSURE: 60 MMHG | WEIGHT: 192 LBS | RESPIRATION RATE: 16 BRPM

## 2025-06-12 DIAGNOSIS — M89.9 CHRONIC KIDNEY DISEASE-MINERAL AND BONE DISORDER (CKD-MBD): ICD-10-CM

## 2025-06-12 DIAGNOSIS — N18.9 CHRONIC KIDNEY DISEASE-MINERAL AND BONE DISORDER (CKD-MBD): ICD-10-CM

## 2025-06-12 DIAGNOSIS — H91.13 PRESBYCUSIS OF BOTH EARS: ICD-10-CM

## 2025-06-12 DIAGNOSIS — E78.2 MIXED HYPERLIPIDEMIA: ICD-10-CM

## 2025-06-12 DIAGNOSIS — N18.31 STAGE 3A CHRONIC KIDNEY DISEASE (HCC): ICD-10-CM

## 2025-06-12 DIAGNOSIS — J41.8 MIXED SIMPLE AND MUCOPURULENT CHRONIC BRONCHITIS (HCC): Primary | ICD-10-CM

## 2025-06-12 DIAGNOSIS — E83.9 CHRONIC KIDNEY DISEASE-MINERAL AND BONE DISORDER (CKD-MBD): ICD-10-CM

## 2025-06-12 DIAGNOSIS — I10 ESSENTIAL HYPERTENSION: ICD-10-CM

## 2025-06-12 DIAGNOSIS — K80.20 CALCULUS OF GALLBLADDER WITHOUT CHOLECYSTITIS WITHOUT OBSTRUCTION: ICD-10-CM

## 2025-06-12 PROBLEM — S46.212A RUPTURE OF LEFT BICEPS TENDON: Status: RESOLVED | Noted: 2024-05-30 | Resolved: 2025-06-12

## 2025-06-12 PROBLEM — R60.0 PERIPHERAL EDEMA: Status: RESOLVED | Noted: 2023-03-13 | Resolved: 2025-06-12

## 2025-06-12 PROCEDURE — G2211 COMPLEX E/M VISIT ADD ON: HCPCS | Performed by: FAMILY MEDICINE

## 2025-06-12 PROCEDURE — 99214 OFFICE O/P EST MOD 30 MIN: CPT | Performed by: FAMILY MEDICINE

## 2025-06-12 NOTE — ASSESSMENT & PLAN NOTE
Lab Results   Component Value Date    EGFR 50 03/19/2025    EGFR 35 10/02/2024    EGFR 34 03/18/2024    CREATININE 1.34 (H) 03/19/2025    CREATININE 1.78 (H) 10/02/2024    CREATININE 1.86 (H) 03/18/2024     Follow-up with nephrology outpatient

## 2025-06-12 NOTE — ASSESSMENT & PLAN NOTE
Lab Results   Component Value Date    EGFR 50 03/19/2025    EGFR 35 10/02/2024    EGFR 34 03/18/2024    CREATININE 1.34 (H) 03/19/2025    CREATININE 1.78 (H) 10/02/2024    CREATININE 1.86 (H) 03/18/2024   Creatinine is stable  Avoid nephrotoxic agent  Optimize blood pressure control  Follow-up with nephrology outpatient

## 2025-06-12 NOTE — PROGRESS NOTES
Name: Edwin Valenzuela Jr.      : 1945      MRN: [id number]  Encounter Provider: Alonzo Dorado MD  Encounter Date: 2025   Encounter department: [facility]  :  Assessment & Plan  Mixed simple and mucopurulent chronic bronchitis (HCC)  Stable  Continue albuterol and Bevespi        Essential hypertension  Stable  At goal of less than 140/90  Continue Lasix, lisinopril and amlodipine      Orders:    Hemoglobin A1C; Future     Stage 3a chronic kidney disease (HCC)  Lab Results   Component Value Date    EGFR 50 2025    EGFR 35 10/02/2024    EGFR 34 2024    CREATININE 1.34 (H) 2025    CREATININE 1.78 (H) 10/02/2024    CREATININE 1.86 (H) 2024   Creatinine is stable  Avoid nephrotoxic agent  Optimize blood pressure control  Follow-up with nephrology outpatient            Chronic kidney disease-mineral and bone disorder (CKD-MBD)  Lab Results   Component Value Date    EGFR 50 2025    EGFR 35 10/02/2024    EGFR 34 2024    CREATININE 1.34 (H) 2025    CREATININE 1.78 (H) 10/02/2024    CREATININE 1.86 (H) 2024     Follow-up with nephrology outpatient           Mixed hyperlipidemia  Continue statin  Check lipid panelOrders:    Lipid panel; Future     Calculus of gallbladder without cholecystitis without obstruction  Asymptomatic  Will continue to monitor       Presbycusis of both ears  Continue wearing hearing aid  Follow-up with audiology outpatient              History of Present Illness   79-year-old male with a history of COPD, hypertension, presbycusis, and CKD presents today for follow-up.  He is doing well overall.  He is at his baseline state of health.  He has no new concerns today.  He is taking his prescription medications regularly.      Review of Systems   Constitutional:  Negative for appetite change, chills, diaphoresis, fatigue and fever.   HENT:  Positive for hearing loss.    Eyes:  Negative for visual  "disturbance.   Respiratory:  Negative for cough, shortness of breath and wheezing.    Cardiovascular:  Negative for chest pain, palpitations and leg swelling.   Gastrointestinal:  Negative for abdominal pain, blood in stool, constipation, diarrhea, nausea and vomiting.   Endocrine: Negative for polydipsia, polyphagia and polyuria.   Genitourinary:  Negative for difficulty urinating, dysuria, frequency, hematuria and urgency.   Musculoskeletal:  Negative for arthralgias.   Skin:  Negative for rash.   Neurological:  Negative for dizziness, tremors, weakness, light-headedness, numbness and headaches.   Psychiatric/Behavioral:  Negative for confusion.        Objective   /60 (BP Location: Left arm, Patient Position: Sitting, Cuff Size: Standard)   Pulse 87   Temp 98 °F (36.7 °C) (Temporal)   Resp 16   Ht 5' 8\" (1.727 m)   Wt 87.1 kg (192 lb)   SpO2 97%   BMI 29.19 kg/m²      Physical Exam  Vitals reviewed.   Constitutional:       General: He is not in acute distress.     Appearance: Normal appearance. He is well-developed. He is not ill-appearing, toxic-appearing or diaphoretic.   HENT:      Head: Normocephalic and atraumatic.      Right Ear: Tympanic membrane and external ear normal.      Left Ear: Tympanic membrane and external ear normal.      Nose: Nose normal.      Mouth/Throat:      Mouth: Mucous membranes are moist.      Pharynx: No oropharyngeal exudate or posterior oropharyngeal erythema.     Eyes:      General: No scleral icterus.        Right eye: No discharge.         Left eye: No discharge.      Extraocular Movements: Extraocular movements intact.      Conjunctiva/sclera: Conjunctivae normal.       Cardiovascular:      Rate and Rhythm: Normal rate and regular rhythm.      Heart sounds: Normal heart sounds. No murmur heard.     No friction rub. No gallop.   Pulmonary:      Effort: Pulmonary effort is normal. No respiratory distress.      Breath sounds: Normal breath sounds. No stridor. No " wheezing or rhonchi.   Abdominal:      General: Bowel sounds are normal. There is no distension.      Palpations: Abdomen is soft. There is no mass.      Tenderness: There is no abdominal tenderness. There is no guarding.     Musculoskeletal:         General: Normal range of motion.      Cervical back: Normal range of motion.      Right lower leg: No edema.      Left lower leg: No edema.     Skin:     General: Skin is warm.      Capillary Refill: Capillary refill takes less than 2 seconds.      Findings: No rash.     Neurological:      General: No focal deficit present.      Mental Status: He is alert and oriented to person, place, and time.      Gait: Gait normal.     Psychiatric:         Mood and Affect: Mood normal.         Behavior: Behavior normal.

## 2025-06-12 NOTE — ASSESSMENT & PLAN NOTE
Stable  At goal of less than 140/90  Continue Lasix, lisinopril and amlodipine      Orders:    Hemoglobin A1C; Future